# Patient Record
Sex: MALE | Race: WHITE | NOT HISPANIC OR LATINO | Employment: FULL TIME | URBAN - METROPOLITAN AREA
[De-identification: names, ages, dates, MRNs, and addresses within clinical notes are randomized per-mention and may not be internally consistent; named-entity substitution may affect disease eponyms.]

---

## 2018-04-04 ENCOUNTER — OFFICE VISIT (OUTPATIENT)
Dept: SURGERY | Facility: CLINIC | Age: 48
End: 2018-04-04
Payer: COMMERCIAL

## 2018-04-04 VITALS
RESPIRATION RATE: 18 BRPM | WEIGHT: 254 LBS | TEMPERATURE: 97 F | HEART RATE: 70 BPM | BODY MASS INDEX: 32.6 KG/M2 | SYSTOLIC BLOOD PRESSURE: 129 MMHG | DIASTOLIC BLOOD PRESSURE: 97 MMHG | OXYGEN SATURATION: 96 % | HEIGHT: 74 IN

## 2018-04-04 DIAGNOSIS — Z80.0 FAMILY HISTORY OF COLON CANCER: ICD-10-CM

## 2018-04-04 DIAGNOSIS — K63.5 HYPERPLASTIC COLONIC POLYP, UNSPECIFIED PART OF COLON: ICD-10-CM

## 2018-04-04 DIAGNOSIS — B96.81 HELICOBACTER POSITIVE GASTRITIS: Primary | ICD-10-CM

## 2018-04-04 DIAGNOSIS — K29.70 HELICOBACTER POSITIVE GASTRITIS: Primary | ICD-10-CM

## 2018-04-04 PROCEDURE — 99213 OFFICE O/P EST LOW 20 MIN: CPT | Mod: S$GLB,,, | Performed by: SURGERY

## 2018-04-04 RX ORDER — PANTOPRAZOLE SODIUM 40 MG/1
40 TABLET, DELAYED RELEASE ORAL DAILY
COMMUNITY
End: 2018-04-04 | Stop reason: SDUPTHER

## 2018-04-04 RX ORDER — LANSOPRAZOLE, AMOXICILLIN, CLARITHROMYCIN 30-500-500
KIT ORAL
Qty: 1 KIT | Refills: 0 | OUTPATIENT
Start: 2018-04-04 | End: 2018-04-18

## 2018-04-04 NOTE — LETTER
April 4, 2018      Tyrone Issa MD  [address]  [facility] [place] [address]           [facility] - General Surgery  [address] [facility] [place] [address]  Phone: [phone]  Fax: [phone]          Patient: Paolo Brower   MR Number: [id number]   YOB: 1970   Date of Visit: 4/4/2018       Dear Dr. Tyrone Issa:    Thank you for referring Paolo Brower to me for evaluation. Attached you will find relevant portions of my assessment and plan of care.    If you have questions, please do not hesitate to call me. I look forward to following Paolo Brower along with you.    Sincerely,    Rene Kinney MD    Enclosure  CC:  No Recipients    If you would like to receive this communication electronically, please contact [email or web address] or [phone] to request more information on [employer] access.    For providers and/or their staff who would like to refer a patient to [facility], please contact us through our one-stop-shop provider referral line, [facility], at [phone].    If you feel you have received this communication in error or would no longer like to receive these types of communications, please e-mail [email or web address]

## 2018-04-04 NOTE — PROGRESS NOTES
"General Surgery  [facility]  Follow-up    HPI/Follow-up exam:  Paolo Brower is a 47 y.o. male status post EGD and colonoscopy for abdominal pain, status post diverticular flare.  Patient also reported increased reflux disease in the last few weeks.  Biopsies reviewed with patient and pathology to reveal positive for Helicobacter pylori.  Colon polyp revealed hyperplastic rectal polyp.  Patient today describes an uncle who is 2 years older than he who was recently diagnosed with colon cancer.  Patient now has a family history of colon cancer.  Recommendations with 1 polyp and pathology or for repeat in 3 years.    PHYSICAL EXAM:  BP (!) 129/97 (BP Location: Right arm, Patient Position: Sitting)   Pulse 70   Temp 97.3 °F (36.3 °C)   Resp 18   Ht 6' 2" (1.88 m)   Wt 115.2 kg (254 lb)   SpO2 96%   BMI 32.61 kg/m²   Gen: Wd Wn male in NAD  Heent: Nc/At, MMM  Cv: RRR  Lung: Non-labored breathing, clear bilaterally  Abd: Soft, non-tender, non-distended  Ext: No cyanosis clubbing or edema    Pathology Results  (Last 10 years)    None        Assessment:  Paolo Brower is a 47 y.o. male s/p EGD and colonoscopy for abdominal pain and status post diverticular flare.  Patient has a family history of colon cancer.    Plan/Medical Decision Making:  Recommendation are for repeat screening colonoscopy in 3 years.  Patient now has a family history of colon cancer and now a partial history of a colon polyp.  Patient also has diverticulosis with mild loss of the luminal elasticity.  Recommendations are for high-fiber diet for diverticulosis.  Patient also has H. pylori positive pathology of stomach.  I will place the patient on triple therapy with a Prevpac and have him follow-up as scheduled below for repeat evaluation.    Follow-up in about 2 weeks (around 4/18/2018).        "

## 2018-04-06 ENCOUNTER — TELEPHONE (OUTPATIENT)
Dept: SURGERY | Facility: CLINIC | Age: 48
End: 2018-04-06

## 2018-04-06 NOTE — TELEPHONE ENCOUNTER
Writer spoke to pharmacist  Cyrus at [facility] pharmacy and he stated that he will get Rx ready for . Pt notified and expressed verbal understanding.

## 2018-04-06 NOTE — TELEPHONE ENCOUNTER
----- Message from Lianet Redmond sent at 4/6/2018 11:22 AM CDT -----  Contact: self  Patient needs to speak to the nurse about an antibiotic that was called in     Patient states it is not at the pharmacy     Please call back [phone]

## 2018-04-06 NOTE — TELEPHONE ENCOUNTER
----- Message from Concepcion Fiore sent at 4/5/2018 10:24 AM CDT -----  Contact: self [phone]  He is calling to follow up on the antibiotic prescription.  The pharmacy has not received it as yet.  Please send it to:   [facility] - [place], [place] - [address]  [address]  [place] [address]  Phone: [phone] Fax: [phone]    Thank you!

## 2018-07-16 ENCOUNTER — LAB VISIT (OUTPATIENT)
Dept: LAB | Facility: HOSPITAL | Age: 48
End: 2018-07-16
Attending: FAMILY MEDICINE
Payer: COMMERCIAL

## 2018-07-16 DIAGNOSIS — R32 INCONTINENCE OF URINE: ICD-10-CM

## 2018-07-16 DIAGNOSIS — Z12.5 SCREENING FOR PROSTATE CANCER: Primary | ICD-10-CM

## 2018-07-16 DIAGNOSIS — R35.0 FREQUENT URINATION: ICD-10-CM

## 2018-07-16 LAB
COMPLEXED PSA SERPL-MCNC: 21.4 NG/ML
ERYTHROCYTE [DISTWIDTH] IN BLOOD BY AUTOMATED COUNT: 13.5 %
ERYTHROCYTE [SEDIMENTATION RATE] IN BLOOD BY WESTERGREN METHOD: 30 MM/HR
HCT VFR BLD AUTO: 43.9 %
HGB BLD-MCNC: 15.2 G/DL
MCH RBC QN AUTO: 28.4 PG
MCHC RBC AUTO-ENTMCNC: 34.6 G/DL
MCV RBC AUTO: 82 FL
PLATELET # BLD AUTO: 302 K/UL
PMV BLD AUTO: 9.8 FL
RBC # BLD AUTO: 5.35 M/UL
WBC # BLD AUTO: 16.25 K/UL

## 2018-07-16 PROCEDURE — 84153 ASSAY OF PSA TOTAL: CPT

## 2018-07-16 PROCEDURE — 85651 RBC SED RATE NONAUTOMATED: CPT

## 2018-07-16 PROCEDURE — 36415 COLL VENOUS BLD VENIPUNCTURE: CPT

## 2018-07-16 PROCEDURE — 85027 COMPLETE CBC AUTOMATED: CPT

## 2018-08-01 ENCOUNTER — OFFICE VISIT (OUTPATIENT)
Dept: SURGERY | Facility: CLINIC | Age: 48
End: 2018-08-01
Payer: COMMERCIAL

## 2018-08-01 ENCOUNTER — TELEPHONE (OUTPATIENT)
Dept: SURGERY | Facility: CLINIC | Age: 48
End: 2018-08-01

## 2018-08-01 VITALS
DIASTOLIC BLOOD PRESSURE: 88 MMHG | HEIGHT: 74 IN | TEMPERATURE: 98 F | OXYGEN SATURATION: 96 % | HEART RATE: 86 BPM | BODY MASS INDEX: 32.47 KG/M2 | SYSTOLIC BLOOD PRESSURE: 135 MMHG | WEIGHT: 253 LBS

## 2018-08-01 DIAGNOSIS — K57.92 DIVERTICULITIS: Primary | ICD-10-CM

## 2018-08-01 DIAGNOSIS — K63.2 GASTROINTESTINAL FISTULA: ICD-10-CM

## 2018-08-01 DIAGNOSIS — N50.819 TESTICULAR PAIN: ICD-10-CM

## 2018-08-01 DIAGNOSIS — N39.0 RECURRENT UTI: ICD-10-CM

## 2018-08-01 PROCEDURE — 3008F BODY MASS INDEX DOCD: CPT | Mod: S$GLB,ICN,, | Performed by: SURGERY

## 2018-08-01 PROCEDURE — 99214 OFFICE O/P EST MOD 30 MIN: CPT | Mod: S$GLB,ICN,, | Performed by: SURGERY

## 2018-08-01 NOTE — TELEPHONE ENCOUNTER
Phoned pt. Told pt that he can still come in for his appointment even though he is running late.  Pt states that he is on his way and will be here in about 15-20 minutes.

## 2018-08-01 NOTE — TELEPHONE ENCOUNTER
----- Message from Loretta Thompson sent at 8/1/2018  1:40 PM CDT -----  Contact: pt  Type:  Same Day Appointment Request    Caller is requesting a same day appointment.  Caller declined first available appointment listed below.      Name of Caller: Paolo   When is the first available appointment? n/a  Symptoms:  n/a  Best Call Back Number:    Additional Information:  Pt missed his appt today and would like to know if he could still come in. Please call back and advise.

## 2018-08-03 NOTE — PROGRESS NOTES
[facility] Surgery H&P Note    Subjective:       Patient ID: Paolo Brower is a 48 y.o. male.    Chief Complaint: Consult (fistula)    HPI:  Paolo Brower is a 48 y.o. male status post appendectomy via open incision back 2014 and EGD colonoscopy back in March of 2018 presents today as an established patient follow-up as referral from Dr. Issa for possible colovesicular fistula.  Patient has been dealing with recurrent bouts of UTIs.  He has had some what appears to be prostatitis with elevated PSA levels treated with antibiotics.  He describes left-sided testicular and penile pain. Some left lower quadrant pain at times as well. Previously had a bout of diverticulitis back in January to February which was treated successfully with outpatient antibiotics and subsequently resolved however patient describes pain at times as left lower quadrant over the last 3-4 months.  No fevers chills.  Patient stated he has no pneumaturia.  He underwent CT scan by Urology at [facility] which showed possible colo vesicular fistula.  He was sent here today for evaluation of this problem.    History reviewed. No pertinent past medical history.  Past Surgical History:   Procedure Laterality Date    APPENDECTOMY      COLONOSCOPY  03/26/2018     Family History   Problem Relation Age of Onset    Diabetes Mother     Hypertension Mother     Cancer Father      Social History     Social History    Marital status:      Spouse name: N/A    Number of children: N/A    Years of education: N/A     Social History Main Topics    Smoking status: Current Every Day Smoker     Packs/day: 1.00     Types: Cigarettes    Smokeless tobacco: None    Alcohol use No    Drug use: No    Sexual activity: Not Asked     Other Topics Concern    None     Social History Narrative    None       No current outpatient prescriptions on file.     No current facility-administered medications for this visit.      Review of patient's allergies  "indicates:  No Known Allergies    Review of Systems   Constitutional: Negative for appetite change, chills and fever.   HENT: Negative for congestion, dental problem and drooling.    Eyes: Negative for photophobia, discharge and itching.   Respiratory: Negative for apnea and chest tightness.    Cardiovascular: Negative for chest pain, palpitations and leg swelling.   Gastrointestinal: Positive for abdominal pain. Negative for abdominal distention.   Endocrine: Negative for cold intolerance and heat intolerance.   Genitourinary: Negative for difficulty urinating and dysuria.   Musculoskeletal: Negative for arthralgias and back pain.   Skin: Negative for color change and pallor.   Neurological: Negative for dizziness, facial asymmetry and headaches.   Hematological: Negative for adenopathy. Does not bruise/bleed easily.   Psychiatric/Behavioral: Negative for agitation, behavioral problems and confusion.       Objective:      Vitals:    08/01/18 1429   BP: 135/88   Pulse: 86   Temp: 97.6 °F (36.4 °C)   TempSrc: Oral   SpO2: 96%   Weight: 114.8 kg (253 lb)   Height: 6' 2" (1.88 m)     Physical Exam   Constitutional: He is oriented to person, place, and time. He appears well-developed and well-nourished.   HENT:   Head: Normocephalic and atraumatic.   Eyes: EOM are normal. Pupils are equal, round, and reactive to light.   Neck: Normal range of motion. Neck supple. No thyromegaly present.   Cardiovascular: Normal rate and regular rhythm.    No murmur heard.  Pulmonary/Chest: Effort normal and breath sounds normal. No respiratory distress.   Abdominal: Soft. Bowel sounds are normal. He exhibits no distension. There is tenderness in the left lower quadrant.       Genitourinary:   Genitourinary Comments: No deformities noted on examination today however patient does have some tenderness to the left testicle left side of the penis   Musculoskeletal: Normal range of motion. He exhibits no edema.   Neurological: He is alert and " oriented to person, place, and time. No cranial nerve deficit.   Skin: Skin is warm. Capillary refill takes less than 2 seconds. No rash noted. He is not diaphoretic. No erythema.   Psychiatric: He has a normal mood and affect.       Lab Review:   CBC:   Lab Results   Component Value Date    WBC 16.25 (H) 07/16/2018    RBC 5.35 07/16/2018    HGB 15.2 07/16/2018    HCT 43.9 07/16/2018     07/16/2018     BMP: No results found for: GLU, NA, K, CL, CO2, BUN, CREATININE, CALCIUM  Diagnostics Review: I have reviewed the patient's outside hospital CT scan.  Some thickening of the sigmoid colon with significant thickening near the bladder.  Concern for colovesicular fistula however no rectal contrast for evaluation.     Assessment:       1. Diverticulitis    2. Gastrointestinal fistula    3. Recurrent UTI    4. Testicular pain        Plan:   Diverticulitis  -     CT Abdomen Pelvis With Contrast; Future; Expected date: 08/03/2018    Gastrointestinal fistula  -     CT Abdomen Pelvis With Contrast; Future; Expected date: 08/03/2018    Recurrent UTI  -     Ambulatory Referral to Urology    Testicular pain  -     Ambulatory Referral to Urology        Medical Decision Making/Counseling:  Patient has symptoms of left lower quadrant pain at times which fits the diagnosis of chronic diverticulitis.  I am unsure of the etiology of the patient's elevated PSA levels at times with left-sided testicular and penile pain.  He does have recurrent UTIs.  No pneumaturia.  Outside hospital CT scan concerning for colovesicular fistula.  Will proceed with CT scan with rectal contrast for further evaluation.  Patient also not happy with outside hospital urology evaluation.  Will have the patient be evaluated by Dr. Kearns of Urology here at [facility] for possible Cysto and for evaluation of her  complaints.

## 2018-08-13 ENCOUNTER — HOSPITAL ENCOUNTER (OUTPATIENT)
Dept: RADIOLOGY | Facility: HOSPITAL | Age: 48
Discharge: HOME | End: 2018-08-13
Attending: SURGERY
Payer: COMMERCIAL

## 2018-08-13 DIAGNOSIS — K63.2 GASTROINTESTINAL FISTULA: Primary | ICD-10-CM

## 2018-08-13 DIAGNOSIS — K63.2 GASTROINTESTINAL FISTULA: ICD-10-CM

## 2018-08-13 DIAGNOSIS — K57.92 DIVERTICULITIS: ICD-10-CM

## 2018-08-13 PROCEDURE — 25500020 PHARM REV CODE 255: Performed by: SURGERY

## 2018-08-13 PROCEDURE — 74178 CT ABD&PLV WO CNTR FLWD CNTR: CPT | Mod: 26,,, | Performed by: RADIOLOGY

## 2018-08-13 PROCEDURE — 74178 CT ABD&PLV WO CNTR FLWD CNTR: CPT | Mod: TC

## 2018-08-13 RX ADMIN — IOHEXOL 100 ML: 350 INJECTION, SOLUTION INTRAVENOUS at 10:08

## 2018-08-13 RX ADMIN — DIATRIZOATE MEGLUMINE AND DIATRIZOATE SODIUM 60 ML: 660; 100 LIQUID ORAL; RECTAL at 10:08

## 2018-08-15 ENCOUNTER — OFFICE VISIT (OUTPATIENT)
Dept: SURGERY | Facility: CLINIC | Age: 48
End: 2018-08-15
Payer: COMMERCIAL

## 2018-08-15 VITALS
HEIGHT: 74 IN | SYSTOLIC BLOOD PRESSURE: 144 MMHG | TEMPERATURE: 98 F | DIASTOLIC BLOOD PRESSURE: 85 MMHG | WEIGHT: 253 LBS | HEART RATE: 90 BPM | BODY MASS INDEX: 32.47 KG/M2 | OXYGEN SATURATION: 96 %

## 2018-08-15 DIAGNOSIS — N32.1 COLOVESICAL FISTULA: Primary | ICD-10-CM

## 2018-08-15 PROCEDURE — 99215 OFFICE O/P EST HI 40 MIN: CPT | Mod: S$GLB,ICN,, | Performed by: SURGERY

## 2018-08-15 PROCEDURE — 3008F BODY MASS INDEX DOCD: CPT | Mod: S$GLB,ICN,, | Performed by: SURGERY

## 2018-08-15 RX ORDER — LIDOCAINE HYDROCHLORIDE 10 MG/ML
1 INJECTION, SOLUTION EPIDURAL; INFILTRATION; INTRACAUDAL; PERINEURAL ONCE
Status: DISCONTINUED | OUTPATIENT
Start: 2018-08-15 | End: 2018-09-12 | Stop reason: HOSPADM

## 2018-08-15 RX ORDER — POLYETHYLENE GLYCOL 3350, SODIUM SULFATE ANHYDROUS, SODIUM BICARBONATE, SODIUM CHLORIDE, POTASSIUM CHLORIDE 236; 22.74; 6.74; 5.86; 2.97 G/4L; G/4L; G/4L; G/4L; G/4L
4 POWDER, FOR SOLUTION ORAL ONCE
Qty: 4000 ML | Refills: 0 | Status: SHIPPED | OUTPATIENT
Start: 2018-08-15 | End: 2018-08-15

## 2018-08-15 RX ORDER — SODIUM CHLORIDE 9 MG/ML
INJECTION, SOLUTION INTRAVENOUS CONTINUOUS
Status: CANCELLED | OUTPATIENT
Start: 2018-08-15

## 2018-08-15 NOTE — H&P
[facility] H&P Note    Subjective:       Patient ID: Paolo Brower is a 48 y.o. male.    Chief Complaint: Follow-up (CT)    HPI:  Paolo Brower is a 48 y.o. male with a history of open appendectomy presents today as an established patient for follow-up examination.  Patient's primary care provider is Dr. Issa.  Patient previously had a diverticular flare back in early spring and subsequently had a colonoscopy post flare.  He was noted to have a significantly tortuous colon during that colonoscopy.  He has since developed symptoms of chronic left lower quadrant pain and has been evaluated by an outpatient urologist Dr. Fernandez.  Patient describe symptoms of chronic left lower quadrant pain worse prior to having bowel movements and better after bowel movements.  This is pathognomonic for a diverticular stricture.  Patient does not have true pneumaturia.  Outside hospital CT scan without rectal contrast given inflammation and pelvis was concerning for colovesicular fistula.  Since last visit patient underwent CT scan with rectal contrast which confirmed colo vesicular fistula.  Patient now presents today for follow-up examination and for scheduling of surgery. Of note patient is a not had fevers.    No past medical history on file.  Past Surgical History:   Procedure Laterality Date    APPENDECTOMY      COLONOSCOPY  03/26/2018     Family History   Problem Relation Age of Onset    Diabetes Mother     Hypertension Mother     Cancer Father      Social History     Socioeconomic History    Marital status:      Spouse name: None    Number of children: None    Years of education: None    Highest education level: None   Social Needs    Financial resource strain: None    Food insecurity - worry: None    Food insecurity - inability: None    Transportation needs - medical: None    Transportation needs - non-medical: None   Occupational History    None   Tobacco Use    Smoking status:  "Current Every Day Smoker     Packs/day: 1.00     Types: Cigarettes   Substance and Sexual Activity    Alcohol use: No    Drug use: No    Sexual activity: None   Other Topics Concern    None   Social History Narrative    None       Current Outpatient Medications   Medication Sig Dispense Refill    polyethylene glycol (GOLYTELY,NULYTELY) 236-22.74-6.74 -5.86 gram suspension Take 4,000 mLs (4 L total) by mouth once. for 1 dose 4000 mL 0     Current Facility-Administered Medications   Medication Dose Route Frequency Provider Last Rate Last Dose    lidocaine (PF) 10 mg/ml (1%) injection 10 mg  1 mL Intradermal Once Rene Kinney MD        lidocaine (PF) 10 mg/ml (1%) injection 10 mg  1 mL Intradermal Once Rene Kinney MD         Review of patient's allergies indicates:  No Known Allergies    Review of Systems   Constitutional: Negative for appetite change, chills and fever.   HENT: Negative for congestion, dental problem and drooling.    Eyes: Negative for photophobia, discharge and itching.   Respiratory: Negative for apnea and chest tightness.    Cardiovascular: Negative for chest pain, palpitations and leg swelling.   Gastrointestinal: Positive for abdominal pain and constipation. Negative for abdominal distention and blood in stool.   Endocrine: Negative for cold intolerance and heat intolerance.   Genitourinary: Negative for difficulty urinating and dysuria.   Musculoskeletal: Negative for arthralgias and back pain.   Skin: Negative for color change and pallor.   Neurological: Negative for dizziness, facial asymmetry and headaches.   Hematological: Negative for adenopathy. Does not bruise/bleed easily.   Psychiatric/Behavioral: Negative for agitation, behavioral problems and confusion.       Objective:      Vitals:    08/15/18 1125   BP: (!) 144/85   Pulse: 90   Temp: 97.7 °F (36.5 °C)   TempSrc: Oral   SpO2: 96%   Weight: 114.8 kg (253 lb)   Height: 6' 2" (1.88 m)     Physical Exam "   Constitutional: He is oriented to person, place, and time. He appears well-developed and well-nourished.   HENT:   Head: Normocephalic and atraumatic.   Eyes: EOM are normal. Pupils are equal, round, and reactive to light.   Neck: Normal range of motion. Neck supple. No thyromegaly present.   Cardiovascular: Normal rate and regular rhythm.   No murmur heard.  Pulmonary/Chest: Effort normal and breath sounds normal. No respiratory distress.   Abdominal: Soft. Bowel sounds are normal. He exhibits no distension. There is tenderness in the left lower quadrant.       Musculoskeletal: Normal range of motion. He exhibits no edema.   Neurological: He is alert and oriented to person, place, and time. No cranial nerve deficit.   Skin: Skin is warm. Capillary refill takes less than 2 seconds. No rash noted. He is not diaphoretic. No erythema.   Psychiatric: He has a normal mood and affect.       Lab Review:   CBC:   Lab Results   Component Value Date    WBC 16.25 (H) 07/16/2018    RBC 5.35 07/16/2018    HGB 15.2 07/16/2018    HCT 43.9 07/16/2018     07/16/2018     BMP:   Lab Results   Component Value Date    BUN 18 08/13/2018    CREATININE 1.0 08/13/2018     Diagnostics Review: CT: Reviewed I have reviewed the patient's CT scan images.  Rectal contrast CT scan confirms colovesicular fistula.     Assessment:       1. Colovesical fistula        Plan:   Colovesical fistula  -     Case Request Operating Room: COLONOSCOPY  -     Place in Outpatient; Standing  -     Vital signs; Standing  -     Insert peripheral IV; Standing  -     Notify Physician; Standing  -     Diet NPO; Standing  -     Place BRUNO hose; Standing  -     Place sequential compression device; Standing  -     Basic metabolic panel; Future; Expected date: 08/15/2018  -     CBC auto differential; Future; Expected date: 08/15/2018  -     EKG 12-lead; Future  -     Case Request Operating Room: COLECTOMY,SIGMOID, CYSTECTOMY, PARTIAL  -     Vital signs; Standing  -      Insert peripheral IV; Standing  -     Verify beta-blocker dose taken within 24 hours if patient is prescribed beta-blocker; Standing  -     Height and weight; Standing  -     Intake and output; Standing  -     Verify discontinuation of antithrombotics; Standing  -     POCT glucose; Standing  -     Verify blood consent; Standing  -     Verify consent; Standing  -     Diet NPO; Standing  -     Pulse Oximetry Q4H; Standing    Other orders  -     polyethylene glycol (GOLYTELY,NULYTELY) 236-22.74-6.74 -5.86 gram suspension; Take 4,000 mLs (4 L total) by mouth once. for 1 dose  Dispense: 4000 mL; Refill: 0  -     lidocaine (PF) 10 mg/ml (1%) injection 10 mg; Inject 1 mL (10 mg total) into the skin once.  -     0.9%  NaCl infusion; Inject into the vein continuous.  -     lidocaine (PF) 10 mg/ml (1%) injection 10 mg; Inject 1 mL (10 mg total) into the skin once.  -     0.9%  NaCl infusion; Inject into the vein continuous.  -     ceFAZolin (ANCEF) 2 g in dextrose 5 % 50 mL IVPB; Inject 2 g into the vein On call Procedure (Surgery).        Medical Decision Making/Counseling:  Patient has chronic diverticulitis with diverticular stricture and now diagnosis of colovesicular fistula which was confirmed by CT scan performed with rectal contrast.  Patient has chronic left lower quadrant pain.  Worse prior to defecation.  Better after defecation.  Pains in the left groin as well which is referred pain. He has been evaluated by an outpatient neurologist however has never undergone a cysto.  With new diagnosis of colovesicular fistula as well as the need for ureteral stents in the setting of chronic diverticular disease in need of resection, I have placed a referral to Dr. Shaikh of Urology here at [facility] for evaluation of cysto with stent placements the day of surgery.  I have also discussed in person with Dr. Shaikh.  Patient's previous colonoscopy was reviewed from back in the spring.  Had tortuosity of the sigmoid colon.   Significant diverticulosis.  Given the new diagnosis of colovesicular fistula, I believe we need to proceed with repeat colonoscopy to define the area of significant disease of the colon for tattooing to ensure that piece of colon is adequately removed in surgery prior to anastomosis. Discussion was had with patient about repeat colonoscopy in he wishes to proceed.  Patient with colovesicular fistula and above symptomatology affecting his quality of life significantly will need colon resection, possible partial bladder resection, repair of bladder, and anastomosis. Possibilities of ostomy were discussed in depth in clinic today.  I believe the possibility of ostomy is low however always possible in colon surgery. Risks of bleeding, infection, anastomotic failure, needing to return to the operating room, intra-abdominal sepsis, possible need for ostomy creation, wound dehiscence, evisceration, postoperative hernia formation, and the intrinsic risks of anesthesia to include strokes, heart attacks and death were discussed in depth in clinic today.  Further the risk of repeat colonoscopy including perforation and bleeding were discussed in depth in clinic as well. Patient understands all the above surgical risk and wishes to proceed with colonoscopy on September 17th after adequate prep and colon resection of sigmoid colon with possible superior rectum, partial bladder resection for colovesicular fistula takedown, bladder repair, possible anastomosis, possible ostomy on September 18 in coordination with Dr. Shaikh of Urology for cysto with stent placement in the setting of chronic diverticular disease.  Total clinic time spent today in this patient 60 min with well greater than half of that time spent in face-to-face counseling.    We will plan to use Invanz 1 g the day of surgery for colon resection for perioperative antibiotics.

## 2018-09-07 ENCOUNTER — OFFICE VISIT (OUTPATIENT)
Dept: UROLOGY | Facility: CLINIC | Age: 48
End: 2018-09-07
Payer: COMMERCIAL

## 2018-09-07 VITALS
SYSTOLIC BLOOD PRESSURE: 136 MMHG | WEIGHT: 257 LBS | DIASTOLIC BLOOD PRESSURE: 89 MMHG | HEIGHT: 74 IN | HEART RATE: 73 BPM | BODY MASS INDEX: 32.98 KG/M2

## 2018-09-07 DIAGNOSIS — K57.92 DIVERTICULITIS: Primary | ICD-10-CM

## 2018-09-07 LAB
BILIRUB SERPL-MCNC: NEGATIVE MG/DL
BLOOD URINE, POC: ABNORMAL
COLOR, POC UA: ABNORMAL
GLUCOSE UR QL STRIP: NEGATIVE
KETONES UR QL STRIP: NEGATIVE
LEUKOCYTE ESTERASE URINE, POC: NEGATIVE
NITRITE, POC UA: NEGATIVE
PH, POC UA: 5
PROTEIN, POC: NEGATIVE
SPECIFIC GRAVITY, POC UA: 1025
UROBILINOGEN, POC UA: NEGATIVE

## 2018-09-07 PROCEDURE — 99243 OFF/OP CNSLTJ NEW/EST LOW 30: CPT | Mod: 25,S$GLB,, | Performed by: UROLOGY

## 2018-09-07 PROCEDURE — 81002 URINALYSIS NONAUTO W/O SCOPE: CPT | Mod: S$GLB,,, | Performed by: UROLOGY

## 2018-09-07 PROCEDURE — 99999 PR PBB SHADOW E&M-EST. PATIENT-LVL III: CPT | Mod: PBBFAC,,, | Performed by: UROLOGY

## 2018-09-07 NOTE — PROGRESS NOTES
Subjective:       Patient ID: Paolo Brower is a 48 y.o. male.    Chief Complaint:   OFFICE NOTE    This is a consultation with Dr. Rene Kinney.    CHIEF COMPLAINT:  Colovesical fistula.    HISTORY OF PRESENT ILLNESS:  Mr. Brower is a 48-year-old male who had 2  episodes of diverticulitis during 2018.  The patient is being evaluated by  Dr. Kinney and found to have a colovesical fistula.  Despite that, the  patient denies any pneumaturia or evidence of urinary tract infections.   There is imaging documentation of the fistula.  Dr. Kinney is planning  to perform a partial colectomy on 09/18/2018 and is requesting for me to  place ureteral catheters before that procedure.  Since I am going to be out  of town.  During that time, I suggested to the patient that we place a  double-J ureteral stent under general anesthesia on the 12th.  In that way,  Dr. Kinney will have his stent placed at the time of the colectomy and  he agreed for it.  The rationale, the risk and possible complications of  cystoscopy with bilateral stent placement has been fully discussed with the  patient including the sensation of the stent after the placement with  dysuria and recurrent hematuria.  It is to be noted that the patient had a  negative previous genitourinary history.  The remaining of the medical and  surgical history are well documented in the medical record and all these  were reviewed by me during this visit.  This patient is healthy with no  significant previous history.    FAMILY HISTORY:  Also negative for  disease.        EOR/HN dd: 09/07/2018 13:17:15 (CDT)   td: 09/07/2018 21:43:11 (CDT)  Doc ID #[id number]   Job ID #[id number]    CC: Rene Kinney MD         SEND A COPY OF THIS REPORT TO DR. LEWIS KINNEY  HPI  Review of Systems   Constitutional: Negative for activity change and appetite change.   HENT: Negative.    Eyes: Negative for discharge.   Respiratory: Negative for cough and shortness of breath.     Cardiovascular: Negative for chest pain and palpitations.   Gastrointestinal: Positive for abdominal pain. Negative for abdominal distention, constipation and vomiting.   Genitourinary: Negative for discharge, dysuria, flank pain, frequency, hematuria, testicular pain and urgency.   Musculoskeletal: Negative for arthralgias.   Skin: Negative for rash.   Neurological: Negative for dizziness.   Psychiatric/Behavioral: The patient is not nervous/anxious.        Objective:      Physical Exam   Constitutional: He appears well-developed and well-nourished.   HENT:   Head: Normocephalic.   Eyes: Pupils are equal, round, and reactive to light.   Neck: Normal range of motion.   Cardiovascular: Normal rate.    Pulmonary/Chest: Effort normal.   Abdominal: Soft. He exhibits no distension and no mass. There is tenderness.   Genitourinary: Rectum normal, prostate normal and penis normal. Rectal exam shows no external hemorrhoid, no mass and no tenderness. Prostate is not enlarged and not tender. Right testis shows no mass, no swelling and no tenderness. Left testis shows tenderness. Left testis shows no mass and no swelling.         Musculoskeletal: Normal range of motion.   Neurological: He is alert.   Skin: Skin is warm.     Psychiatric: He has a normal mood and affect.       Assessment:       1. Diverticulitis        Plan:       Diverticulitis  -     POCT URINE DIPSTICK WITHOUT MICROSCOPE  -     Case Request Operating Room: CYSTOSCOPY, WITH URETERAL STENT INSERTION  -     Place in Outpatient; Standing  -     Place sequential compression device; Standing    Other orders  -     IP VTE LOW RISK PATIENT; Standing  -     ceFAZolin (ANCEF) 2 g in dextrose 5 % 50 mL IVPB; Inject 2 g into the vein On call Procedure (Surgery).

## 2018-09-07 NOTE — LETTER
September 7, 2018      Rene Kinney MD  [address] [facility] [place] [address]           [facility] - Urology  [address] [place] [address]  Phone: [phone]  Fax: [phone]          Patient: Paolo Brower   MR Number: [id number]   YOB: 1970   Date of Visit: 9/7/2018       Dear Dr. Rene Kinney:    Thank you for referring Paolo Brower to me for evaluation. Attached you will find relevant portions of my assessment and plan of care.    If you have questions, please do not hesitate to call me. I look forward to following Paolo Brower along with you.    Sincerely,    Charli Shaikh MD    Enclosure  CC:  No Recipients    If you would like to receive this communication electronically, please contact [email or web address] or [phone] to request more information on [email or web address] Link access.    For providers and/or their staff who would like to refer a patient to [facility], please contact us through our one-stop-shop provider referral line, [facility], at [phone].    If you feel you have received this communication in error or would no longer like to receive these types of communications, please e-mail [email or web address]

## 2018-09-11 ENCOUNTER — HOSPITAL ENCOUNTER (OUTPATIENT)
Dept: CARDIOLOGY | Facility: HOSPITAL | Age: 48
Discharge: HOME | End: 2018-09-11
Attending: SURGERY
Payer: COMMERCIAL

## 2018-09-11 ENCOUNTER — ANESTHESIA EVENT (OUTPATIENT)
Dept: SURGERY | Facility: HOSPITAL | Age: 48
End: 2018-09-11
Payer: COMMERCIAL

## 2018-09-11 DIAGNOSIS — N32.1 COLOVESICAL FISTULA: ICD-10-CM

## 2018-09-11 PROCEDURE — 93005 ELECTROCARDIOGRAM TRACING: CPT

## 2018-09-11 NOTE — ANESTHESIA PREPROCEDURE EVALUATION
09/11/2018  Paolo Brower is a 48 y.o., male.    Anesthesia Evaluation    I have reviewed the Patient Summary Reports.    I have reviewed the Nursing Notes.   I have reviewed the Medications.     Review of Systems  Anesthesia Hx:  No problems with previous Anesthesia    Social:  Former Smoker        Physical Exam  General:  Well nourished    Airway/Jaw/Neck:  Airway Findings: Mouth Opening: Normal Tongue: Normal  General Airway Assessment: Adult  Mallampati: II  Improves to II with phonation.  TM Distance: Normal, at least 6 cm  Jaw/Neck Findings:  Neck ROM: Normal ROM       Chest/Lungs:  Chest/Lungs Findings:    Heart/Vascular:  Heart Findings: Rate: Normal  Rhythm: Regular Rhythm        Mental Status:  Mental Status Findings:  Cooperative, Alert and Oriented         Anesthesia Plan  Type of Anesthesia, risks & benefits discussed:  Anesthesia Type:  general  Patient's Preference:   Intra-op Monitoring Plan: standard ASA monitors  Intra-op Monitoring Plan Comments:   Post Op Pain Control Plan: IV/PO Opioids PRN  Post Op Pain Control Plan Comments:   Induction:   IV  Beta Blocker:  Patient is not currently on a Beta-Blocker (No further documentation required).       Informed Consent: Patient understands risks and agrees with Anesthesia plan.  Questions answered. Anesthesia consent signed with patient.  ASA Score: 2     Day of Surgery Review of History & Physical: I have interviewed and examined the patient. I have reviewed the patient's H&P dated:            Ready For Surgery From Anesthesia Perspective.

## 2018-09-12 ENCOUNTER — HOSPITAL ENCOUNTER (OUTPATIENT)
Facility: HOSPITAL | Age: 48
Discharge: HOME | End: 2018-09-12
Attending: UROLOGY | Admitting: UROLOGY
Payer: COMMERCIAL

## 2018-09-12 ENCOUNTER — ANESTHESIA (OUTPATIENT)
Dept: SURGERY | Facility: HOSPITAL | Age: 48
End: 2018-09-12
Payer: COMMERCIAL

## 2018-09-12 VITALS
SYSTOLIC BLOOD PRESSURE: 153 MMHG | DIASTOLIC BLOOD PRESSURE: 93 MMHG | HEART RATE: 66 BPM | HEIGHT: 74 IN | BODY MASS INDEX: 32.98 KG/M2 | TEMPERATURE: 98 F | WEIGHT: 257 LBS | RESPIRATION RATE: 18 BRPM | OXYGEN SATURATION: 96 %

## 2018-09-12 DIAGNOSIS — K57.92 DIVERTICULITIS: ICD-10-CM

## 2018-09-12 PROCEDURE — C1769 GUIDE WIRE: HCPCS | Performed by: UROLOGY

## 2018-09-12 PROCEDURE — 25000003 PHARM REV CODE 250: Performed by: ANESTHESIOLOGY

## 2018-09-12 PROCEDURE — 36000707: Performed by: UROLOGY

## 2018-09-12 PROCEDURE — 25000003 PHARM REV CODE 250: Performed by: UROLOGY

## 2018-09-12 PROCEDURE — 63600175 PHARM REV CODE 636 W HCPCS: Performed by: UROLOGY

## 2018-09-12 PROCEDURE — 76000 FLUOROSCOPY <1 HR PHYS/QHP: CPT | Mod: 26,59,, | Performed by: UROLOGY

## 2018-09-12 PROCEDURE — 25000003 PHARM REV CODE 250

## 2018-09-12 PROCEDURE — 37000008 HC ANESTHESIA 1ST 15 MINUTES: Performed by: UROLOGY

## 2018-09-12 PROCEDURE — 71000015 HC POSTOP RECOV 1ST HR: Performed by: UROLOGY

## 2018-09-12 PROCEDURE — 36000706: Performed by: UROLOGY

## 2018-09-12 PROCEDURE — S0028 INJECTION, FAMOTIDINE, 20 MG: HCPCS

## 2018-09-12 PROCEDURE — C2617 STENT, NON-COR, TEM W/O DEL: HCPCS | Performed by: UROLOGY

## 2018-09-12 PROCEDURE — 52332 CYSTOSCOPY AND TREATMENT: CPT | Mod: 50,,, | Performed by: UROLOGY

## 2018-09-12 PROCEDURE — 71000016 HC POSTOP RECOV ADDL HR: Performed by: UROLOGY

## 2018-09-12 PROCEDURE — 71000033 HC RECOVERY, INTIAL HOUR: Performed by: UROLOGY

## 2018-09-12 PROCEDURE — 37000009 HC ANESTHESIA EA ADD 15 MINS: Performed by: UROLOGY

## 2018-09-12 PROCEDURE — 74420 UROGRAPHY RTRGR +-KUB: CPT | Mod: 26,,, | Performed by: UROLOGY

## 2018-09-12 PROCEDURE — D9220A PRA ANESTHESIA: Mod: ANES,,, | Performed by: ANESTHESIOLOGY

## 2018-09-12 PROCEDURE — 63600175 PHARM REV CODE 636 W HCPCS: Performed by: NURSE ANESTHETIST, CERTIFIED REGISTERED

## 2018-09-12 PROCEDURE — D9220A PRA ANESTHESIA: Mod: CRNA,,, | Performed by: NURSE ANESTHETIST, CERTIFIED REGISTERED

## 2018-09-12 PROCEDURE — C1758 CATHETER, URETERAL: HCPCS | Performed by: UROLOGY

## 2018-09-12 DEVICE — IMPLANTABLE DEVICE: Type: IMPLANTABLE DEVICE | Site: KIDNEY | Status: FUNCTIONAL

## 2018-09-12 RX ORDER — PROPOFOL 10 MG/ML
VIAL (ML) INTRAVENOUS
Status: DISCONTINUED | OUTPATIENT
Start: 2018-09-12 | End: 2018-09-12

## 2018-09-12 RX ORDER — MIDAZOLAM HYDROCHLORIDE 1 MG/ML
INJECTION, SOLUTION INTRAMUSCULAR; INTRAVENOUS
Status: DISCONTINUED | OUTPATIENT
Start: 2018-09-12 | End: 2018-09-12

## 2018-09-12 RX ORDER — ONDANSETRON 2 MG/ML
4 INJECTION INTRAMUSCULAR; INTRAVENOUS DAILY PRN
Status: DISCONTINUED | OUTPATIENT
Start: 2018-09-12 | End: 2018-09-12 | Stop reason: HOSPADM

## 2018-09-12 RX ORDER — SODIUM CHLORIDE, SODIUM LACTATE, POTASSIUM CHLORIDE, CALCIUM CHLORIDE 600; 310; 30; 20 MG/100ML; MG/100ML; MG/100ML; MG/100ML
INJECTION, SOLUTION INTRAVENOUS CONTINUOUS
Status: DISCONTINUED | OUTPATIENT
Start: 2018-09-12 | End: 2018-09-12 | Stop reason: HOSPADM

## 2018-09-12 RX ORDER — DIPHENHYDRAMINE HYDROCHLORIDE 50 MG/ML
12.5 INJECTION INTRAMUSCULAR; INTRAVENOUS
Status: DISCONTINUED | OUTPATIENT
Start: 2018-09-12 | End: 2018-09-12 | Stop reason: HOSPADM

## 2018-09-12 RX ORDER — CEFAZOLIN SODIUM 2 G/50ML
SOLUTION INTRAVENOUS
Status: COMPLETED
Start: 2018-09-12 | End: 2018-09-12

## 2018-09-12 RX ORDER — CEFAZOLIN SODIUM 2 G/50ML
2 SOLUTION INTRAVENOUS ONCE
Status: DISCONTINUED | OUTPATIENT
Start: 2018-09-12 | End: 2018-09-12 | Stop reason: HOSPADM

## 2018-09-12 RX ORDER — SODIUM CHLORIDE 9 MG/ML
INJECTION, SOLUTION INTRAVENOUS CONTINUOUS
Status: DISCONTINUED | OUTPATIENT
Start: 2018-09-12 | End: 2018-09-12 | Stop reason: HOSPADM

## 2018-09-12 RX ORDER — LIDOCAINE HYDROCHLORIDE 20 MG/ML
JELLY TOPICAL
Status: DISCONTINUED | OUTPATIENT
Start: 2018-09-12 | End: 2018-09-12 | Stop reason: HOSPADM

## 2018-09-12 RX ORDER — SULFAMETHOXAZOLE AND TRIMETHOPRIM 800; 160 MG/1; MG/1
1 TABLET ORAL 2 TIMES DAILY
Qty: 40 TABLET | Refills: 0 | Status: SHIPPED | OUTPATIENT
Start: 2018-09-12 | End: 2018-10-02

## 2018-09-12 RX ORDER — OXYBUTYNIN CHLORIDE 5 MG/1
5 TABLET ORAL ONCE
Status: COMPLETED | OUTPATIENT
Start: 2018-09-12 | End: 2018-09-12

## 2018-09-12 RX ORDER — CEFAZOLIN SODIUM 2 G/50ML
2 SOLUTION INTRAVENOUS
Status: COMPLETED | OUTPATIENT
Start: 2018-09-12 | End: 2018-09-12

## 2018-09-12 RX ORDER — FAMOTIDINE 10 MG/ML
INJECTION INTRAVENOUS
Status: COMPLETED
Start: 2018-09-12 | End: 2018-09-12

## 2018-09-12 RX ORDER — MORPHINE SULFATE 4 MG/ML
2 INJECTION, SOLUTION INTRAMUSCULAR; INTRAVENOUS EVERY 5 MIN PRN
Status: DISCONTINUED | OUTPATIENT
Start: 2018-09-12 | End: 2018-09-12 | Stop reason: HOSPADM

## 2018-09-12 RX ORDER — PHENAZOPYRIDINE HYDROCHLORIDE 200 MG/1
200 TABLET, FILM COATED ORAL 3 TIMES DAILY PRN
Qty: 20 TABLET | Refills: 1 | Status: ON HOLD | OUTPATIENT
Start: 2018-09-12 | End: 2018-09-24 | Stop reason: HOSPADM

## 2018-09-12 RX ORDER — KETOROLAC TROMETHAMINE 30 MG/ML
15 INJECTION, SOLUTION INTRAMUSCULAR; INTRAVENOUS ONCE
Status: DISCONTINUED | OUTPATIENT
Start: 2018-09-12 | End: 2018-09-12 | Stop reason: HOSPADM

## 2018-09-12 RX ORDER — SODIUM CHLORIDE, SODIUM LACTATE, POTASSIUM CHLORIDE, CALCIUM CHLORIDE 600; 310; 30; 20 MG/100ML; MG/100ML; MG/100ML; MG/100ML
125 INJECTION, SOLUTION INTRAVENOUS CONTINUOUS
Status: DISCONTINUED | OUTPATIENT
Start: 2018-09-12 | End: 2018-09-12 | Stop reason: HOSPADM

## 2018-09-12 RX ORDER — MEPERIDINE HYDROCHLORIDE 50 MG/ML
INJECTION INTRAMUSCULAR; INTRAVENOUS; SUBCUTANEOUS
Status: DISCONTINUED | OUTPATIENT
Start: 2018-09-12 | End: 2018-09-12

## 2018-09-12 RX ORDER — FAMOTIDINE 10 MG/ML
20 INJECTION INTRAVENOUS ONCE
Status: DISCONTINUED | OUTPATIENT
Start: 2018-09-12 | End: 2018-09-12 | Stop reason: HOSPADM

## 2018-09-12 RX ADMIN — OXYBUTYNIN CHLORIDE 5 MG: 5 TABLET ORAL at 09:09

## 2018-09-12 RX ADMIN — PROPOFOL 200 MG: 10 INJECTION, EMULSION INTRAVENOUS at 07:09

## 2018-09-12 RX ADMIN — SODIUM CHLORIDE, POTASSIUM CHLORIDE, SODIUM LACTATE AND CALCIUM CHLORIDE 125 ML/HR: 600; 310; 30; 20 INJECTION, SOLUTION INTRAVENOUS at 09:09

## 2018-09-12 RX ADMIN — MIDAZOLAM HYDROCHLORIDE 2 MG: 1 INJECTION, SOLUTION INTRAMUSCULAR; INTRAVENOUS at 07:09

## 2018-09-12 RX ADMIN — SODIUM CHLORIDE: 9 INJECTION, SOLUTION INTRAVENOUS at 07:09

## 2018-09-12 RX ADMIN — MEPERIDINE HYDROCHLORIDE 50 MG: 50 INJECTION INTRAMUSCULAR; INTRAVENOUS; SUBCUTANEOUS at 07:09

## 2018-09-12 RX ADMIN — CEFAZOLIN SODIUM 2 G: 2 SOLUTION INTRAVENOUS at 07:09

## 2018-09-12 RX ADMIN — FAMOTIDINE 20 MG: 10 INJECTION, SOLUTION INTRAVENOUS at 07:09

## 2018-09-12 NOTE — OP NOTE
DATE OF PROCEDURE:  09/12/2018.    PREOPERATIVE DIAGNOSIS:  Colovesical fistula.    POSTOPERATIVE DIAGNOSIS:  Colovesical fistula.    PROCEDURE PERFORMED:  Cystoscopy, bilateral retrograde pyelograms.   Placement of bilateral ureteral stents.    ANESTHESIA:  General.    PROCEDURE IN DETAILS:  With the patient under satisfactory general  anesthesia and placed in lithotomy position, her genitals were prepped and  draped in the usual manner.  The urethra was treated with lidocaine gel 2%  and the #22-[place] Storz cystoscope was placed through urethra into the  bladder.  Inspection of the pendulous and bulbar urethra were found to be  unremarkable.  The prostatic urethra has a length of 4.5 cm with no  evidence of Bladder outlet obstruction.  The scope was advanced into the  bladder and the bladder was inspected with the 30 and 70 degree lenses.   The bladder did show a significant amount of debris.  The trigone was found  to be normal shape, size and position and both ureteral orifices are within  the trigone.  The inspection of the bladder shows an area of inflammation  with a possible fistula tract in the posterior left bladder wall.  The  opening of the fistula was difficult to be seen due to the inflammation of  the patient's bladder mucosa.    At this point, a cone tip catheter was placed through the cystoscope into  the bladder and into the left ureter, 10 mL of contrast material were  injected slowly under fluoroscopic guidance.  The retrograde pyelogram  shows that the ureters have a normal course and shape.  The upper  collecting system is also unremarkable.  The cone tip catheter was removed  and a wire guide was placed through the cystoscope into the ureter and all  the way up into the collecting system.  When the wire was properly placed,  26 cm 6-[place] double-J stent was placed under fluoroscopy.  When the  proper placement of the stent was achieved, the wire guide was removed and  the stent was seen  coiling in the renal pelvis and in the bladder.    The same procedure was carried out in the opposite side and the findings  were the same.  The collecting system is unremarkable and the same size  stent was placed.  The renal pelvis in the left side is a little bit  smaller than on the right and the stent has difficulty coiling in the upper  part after removal of the wire.  In any way, the proper placement of the  stent was achieved.  When this was achieved, x-rays were taken for future  documentation.  The bladder was emptied and the cystoscope was removed.   The patient was transferred to Recovery Room in satisfactory condition.    PLAN FOR THIS PATIENT:  After the colectomy was performed, I will suggest  that we leave the stents for a week to 10 days and then under local  anesthesia, we plan to remove the stents.            /maxine [id number] holly(s)          EOR/HN dd: 09/12/2018 08:41:43 (CDT)   td: 09/12/2018 09:07:42 (CDT)  Doc ID #[id number]   Job ID #[id number]    CC: Rene Kinney MD

## 2018-09-12 NOTE — H&P
CHIEF COMPLAINT:  Colovesical fistula.     HISTORY OF PRESENT ILLNESS:  Mr. Brower is a 48-year-old male who had 2  episodes of diverticulitis during 2018.  The patient is being evaluated by  Dr. Kinney and found to have a colovesical fistula.  Despite that, the  patient denies any pneumaturia or evidence of urinary tract infections.   There is imaging documentation of the fistula.  Dr. Kinney is planning  to perform a partial colectomy on 09/18/2018 and is requesting for me to  place ureteral catheters before that procedure.  Since I am going to be out  of town.  During that time, I suggested to the patient that we place a  double-J ureteral stent under general anesthesia on the 12th.  In that way,  Dr. Kinney will have his stent placed at the time of the colectomy and  he agreed for it.  The rationale, the risk and possible complications of  cystoscopy with bilateral stent placement has been fully discussed with the  patient including the sensation of the stent after the placement with  dysuria and recurrent hematuria.  It is to be noted that the patient had a  negative previous genitourinary history.  The remaining of the medical and  surgical history are well documented in the medical record and all these  were reviewed by me during this visit.  This patient is healthy with no  significant previous history.     FAMILY HISTORY:  Also negative for  disease.             HPI  Review of Systems   Constitutional: Negative for activity change and appetite change.   HENT: Negative.    Eyes: Negative for discharge.   Respiratory: Negative for cough and shortness of breath.    Cardiovascular: Negative for chest pain and palpitations.   Gastrointestinal: Positive for abdominal pain. Negative for abdominal distention, constipation and vomiting.   Genitourinary: Negative for discharge, dysuria, flank pain, frequency, hematuria, testicular pain and urgency.   Musculoskeletal: Negative for arthralgias.   Skin:  Negative for rash.   Neurological: Negative for dizziness.   Psychiatric/Behavioral: The patient is not nervous/anxious.        Objective:   Physical Exam   Constitutional: He appears well-developed and well-nourished.   HENT:   Head: Normocephalic.   Eyes: Pupils are equal, round, and reactive to light.   Neck: Normal range of motion.   Cardiovascular: Normal rate.    Pulmonary/Chest: Effort normal.   Abdominal: Soft. He exhibits no distension and no mass. There is tenderness.   Genitourinary: Rectum normal, prostate normal and penis normal. Rectal exam shows no external hemorrhoid, no mass and no tenderness. Prostate is not enlarged and not tender. Right testis shows no mass, no swelling and no tenderness. Left testis shows tenderness. Left testis shows no mass and no swelling.         Musculoskeletal: Normal range of motion.   Neurological: He is alert.   Skin: Skin is warm.     Psychiatric: He has a normal mood and affect.       Assessment:       1. Diverticulitis        Plan:       Diverticulitis  Cystoscopy and stent placement bilateral

## 2018-09-12 NOTE — TRANSFER OF CARE
"Anesthesia Transfer of Care Note    Patient: Paolo Brower    Procedure(s) Performed: Procedure(s) (LRB):  CYSTOSCOPY, WITH URETERAL STENT INSERTION (Bilateral)    Patient location: PACU    Anesthesia Type: general    Transport from OR: Transported from OR on room air with adequate spontaneous ventilation    Post pain: adequate analgesia    Post assessment: no apparent anesthetic complications and tolerated procedure well    Post vital signs: stable    Level of consciousness: awake, alert and oriented    Nausea/Vomiting: no nausea/vomiting    Complications: none    Transfer of care protocol was followed      Last vitals:   Visit Vitals  /76 (BP Location: Left arm, Patient Position: Lying)   Pulse 73   Temp 36.5 °C (97.7 °F) (Oral)   Resp 18   Ht 6' 2" (1.88 m)   Wt 116.6 kg (257 lb)   SpO2 96%   BMI 33.00 kg/m²     "

## 2018-09-12 NOTE — PLAN OF CARE
0855 pt back to bathroom . sg 1030 pt has urinated a few times all 50ml dr caban wants pt to void 100ml x1 before he can go home. 1100 pt voided 150 ml. He is getting dressed dr caban at bedside waiting on wife to come back to  her .

## 2018-09-12 NOTE — BRIEF OP NOTE
Brief Operative Note     Surgery Date: 9/12/2018    Surgeon:   Charli Shaikh MD     Pre-op Diagnosis:  Diverticulitis [K57.92]  Diverticulitis [K57.92]    Post-op Diagnosis:  Same    Procedure:  Procedure(s) (LRB):  CYSTOSCOPY, WITH URETERAL STENT INSERTION (Bilateral)    Anesthesia: Local MAC    Findings/Key Components:  [place]-vesical fistulae    Estimated Blood Loss: Minimal                                                                                                                           Discharge Summary    Admit Date: 9/12/2018     Attending Physician: Charli Shaikh MD     Discharge Physician: Charli Shaikh MD      Discharge Date: 9/12/2018    Treatments/Procedures: Procedure(s) (LRB):  CYSTOSCOPY, WITH URETERAL STENT INSERTION (Bilateral)  Final Diagnosis:[place]-vesical fistulae  Hospital Course: Cystoscopy, oscar retrogrades and oscar. Stent placement    Disposition: Home or Self Care     Patient Instructions:     Current Discharge Medication List:         Paolo Brower   Home Medication Instructions TEODORO:[id number]    Printed on:09/12/18 0835   Medication Information                      phenazopyridine (PYRIDIUM) 200 MG tablet  Take 1 tablet (200 mg total) by mouth 3 (three) times daily as needed for Pain.             sulfamethoxazole-trimethoprim 800-160mg (BACTRIM DS) 800-160 mg Tab  Take 1 tablet by mouth 2 (two) times daily. for 20 days                     Current Discharge Medication List      START taking these medications    Details   phenazopyridine (PYRIDIUM) 200 MG tablet Take 1 tablet (200 mg total) by mouth 3 (three) times daily as needed for Pain.  Qty: 20 tablet, Refills: 1      sulfamethoxazole-trimethoprim 800-160mg (BACTRIM DS) 800-160 mg Tab Take 1 tablet by mouth 2 (two) times daily. for 20 days  Qty: 40 tablet, Refills: 0             Discharge Procedure Orders:    Discharge Procedure Orders   Diet Adult Regular     Activity as tolerated

## 2018-09-12 NOTE — ANESTHESIA POSTPROCEDURE EVALUATION
"Anesthesia Post Evaluation    Patient: Paolo Brower    Procedure(s) Performed: Procedure(s) (LRB):  CYSTOSCOPY, WITH URETERAL STENT INSERTION (Bilateral)    Final Anesthesia Type: general  Patient location during evaluation: PACU  Patient participation: Yes- Able to Participate  Level of consciousness: awake and alert  Post-procedure vital signs: reviewed and stable  Pain management: adequate  Airway patency: patent  PONV status at discharge: No PONV  Anesthetic complications: no      Cardiovascular status: blood pressure returned to baseline  Respiratory status: unassisted  Hydration status: euvolemic  Follow-up not needed.        Visit Vitals  BP (!) 153/93   Pulse 66   Temp 36.5 °C (97.7 °F)   Resp 18   Ht 6' 2" (1.88 m)   Wt 116.6 kg (257 lb)   SpO2 96%   BMI 33.00 kg/m²       Pain/Ortiz Score: Pain Assessment Performed: Yes (9/12/2018  6:44 AM)  Presence of Pain: denies (9/12/2018  6:44 AM)  Ortiz Score: 10 (9/12/2018 11:15 AM)  Modified Ortiz Score: 20 (9/12/2018 11:15 AM)        "

## 2018-09-17 ENCOUNTER — ANESTHESIA (OUTPATIENT)
Dept: SURGERY | Facility: HOSPITAL | Age: 48
DRG: 655 | End: 2018-09-17
Payer: COMMERCIAL

## 2018-09-17 ENCOUNTER — ANESTHESIA EVENT (OUTPATIENT)
Dept: SURGERY | Facility: HOSPITAL | Age: 48
DRG: 655 | End: 2018-09-17
Payer: COMMERCIAL

## 2018-09-17 PROCEDURE — D9220A PRA ANESTHESIA: Mod: ANES,,, | Performed by: ANESTHESIOLOGY

## 2018-09-17 PROCEDURE — D9220A PRA ANESTHESIA: Mod: CRNA,,, | Performed by: NURSE ANESTHETIST, CERTIFIED REGISTERED

## 2018-09-17 PROCEDURE — 63600175 PHARM REV CODE 636 W HCPCS: Performed by: NURSE ANESTHETIST, CERTIFIED REGISTERED

## 2018-09-17 PROCEDURE — 0DBP8ZZ EXCISION OF RECTUM, VIA NATURAL OR ARTIFICIAL OPENING ENDOSCOPIC: ICD-10-PCS | Performed by: SURGERY

## 2018-09-17 RX ORDER — MIDAZOLAM HYDROCHLORIDE 1 MG/ML
INJECTION, SOLUTION INTRAMUSCULAR; INTRAVENOUS
Status: DISCONTINUED | OUTPATIENT
Start: 2018-09-17 | End: 2018-09-17

## 2018-09-17 RX ORDER — PROPOFOL 10 MG/ML
VIAL (ML) INTRAVENOUS
Status: DISCONTINUED | OUTPATIENT
Start: 2018-09-17 | End: 2018-09-17

## 2018-09-17 RX ADMIN — PROPOFOL 20 MG: 10 INJECTION, EMULSION INTRAVENOUS at 09:09

## 2018-09-17 RX ADMIN — PROPOFOL 20 MG: 10 INJECTION, EMULSION INTRAVENOUS at 08:09

## 2018-09-17 RX ADMIN — PROPOFOL 30 MG: 10 INJECTION, EMULSION INTRAVENOUS at 09:09

## 2018-09-17 RX ADMIN — PROPOFOL 50 MG: 10 INJECTION, EMULSION INTRAVENOUS at 08:09

## 2018-09-17 RX ADMIN — PROPOFOL 30 MG: 10 INJECTION, EMULSION INTRAVENOUS at 08:09

## 2018-09-17 RX ADMIN — PROPOFOL 100 MG: 10 INJECTION, EMULSION INTRAVENOUS at 08:09

## 2018-09-17 RX ADMIN — MIDAZOLAM HYDROCHLORIDE 2 MG: 1 INJECTION, SOLUTION INTRAMUSCULAR; INTRAVENOUS at 08:09

## 2018-09-17 NOTE — ANESTHESIA PREPROCEDURE EVALUATION
09/17/2018  Paolo Brower is a 48 y.o., male.    Pre-op Assessment    I have reviewed the Patient Summary Reports.    I have reviewed the Nursing Notes.   I have reviewed the Medications.     Review of Systems  Anesthesia Hx:  No problems with previous Anesthesia  Neg history of prior surgery. Denies Family Hx of Anesthesia complications.   Denies Personal Hx of Anesthesia complications.   Social:  Smoker    Hematology/Oncology:  Hematology Normal   Oncology Normal     EENT/Dental:EENT/Dental Normal   Cardiovascular:  Cardiovascular Normal     Pulmonary:  Pulmonary Normal    Renal/:  Renal/ Normal     Hepatic/GI:  Hepatic/GI Normal    Musculoskeletal:  Musculoskeletal Normal    Neurological:  Neurology Normal    Endocrine:  Endocrine Normal    Dermatological:  Skin Normal    Psych:  Psychiatric Normal           Physical Exam  General:  Well nourished    Airway/Jaw/Neck:  AIRWAY FINDINGS: Normal      Eyes/Ears/Nose:  EYES/EARS/NOSE FINDINGS: Normal   Dental:  DENTAL FINDINGS: Normal   Chest/Lungs:  Chest/Lungs Clear    Heart/Vascular:  Heart Findings: Normal Heart murmur: negative Vascular Findings: Normal    Abdomen:  Abdomen Findings: Normal    Musculoskeletal:  Musculoskeletal Findings: Normal   Skin:  Skin Findings: Normal         Anesthesia Plan  Type of Anesthesia, risks & benefits discussed:  Anesthesia Type:  general  Patient's Preference:   Intra-op Monitoring Plan: standard ASA monitors  Intra-op Monitoring Plan Comments:   Post Op Pain Control Plan:   Post Op Pain Control Plan Comments:   Induction:   IV  Beta Blocker:  Patient is not currently on a Beta-Blocker (No further documentation required).       Informed Consent: Patient understands risks and agrees with Anesthesia plan.  Questions answered. Anesthesia consent signed with patient.  ASA Score: 2     Day of Surgery Review of History &  Physical:    H&P update referred to the provider.

## 2018-09-17 NOTE — TRANSFER OF CARE
Anesthesia Transfer of Care Note    Patient: Paolo Brower    Procedure(s) Performed: Procedure(s) (LRB):  COLONOSCOPY (N/A)    Patient location: PACU    Anesthesia Type: general    Transport from OR: Transported from OR on room air with adequate spontaneous ventilation    Post pain: adequate analgesia    Post assessment: no apparent anesthetic complications and tolerated procedure well    Post vital signs: stable    Level of consciousness: awake, alert and oriented    Nausea/Vomiting: no nausea/vomiting    Complications: none    Transfer of care protocol was followed      Last vitals:   Visit Vitals  /88 (BP Location: Left arm, Patient Position: Lying)   Pulse 87   Temp 36.7 °C (98 °F) (Oral)   Resp 18   Wt 114.8 kg (253 lb)   SpO2 96%   BMI 32.48 kg/m²

## 2018-09-18 ENCOUNTER — ANESTHESIA EVENT (OUTPATIENT)
Dept: SURGERY | Facility: HOSPITAL | Age: 48
DRG: 655 | End: 2018-09-18
Payer: COMMERCIAL

## 2018-09-18 ENCOUNTER — HOSPITAL ENCOUNTER (INPATIENT)
Facility: HOSPITAL | Age: 48
LOS: 6 days | Discharge: HOME | DRG: 655 | End: 2018-09-24
Attending: SURGERY | Admitting: INTERNAL MEDICINE
Payer: COMMERCIAL

## 2018-09-18 ENCOUNTER — ANESTHESIA (OUTPATIENT)
Dept: SURGERY | Facility: HOSPITAL | Age: 48
DRG: 655 | End: 2018-09-18
Payer: COMMERCIAL

## 2018-09-18 DIAGNOSIS — N32.1 COLOVESICAL FISTULA: Primary | ICD-10-CM

## 2018-09-18 DIAGNOSIS — Z48.89 ENCOUNTER FOR POSTOPERATIVE CARE: ICD-10-CM

## 2018-09-18 LAB
ANION GAP SERPL CALC-SCNC: 7 MMOL/L
BUN SERPL-MCNC: 15 MG/DL
CALCIUM SERPL-MCNC: 8.1 MG/DL
CHLORIDE SERPL-SCNC: 103 MMOL/L
CO2 SERPL-SCNC: 26 MMOL/L
CREAT SERPL-MCNC: 1.2 MG/DL
ERYTHROCYTE [DISTWIDTH] IN BLOOD BY AUTOMATED COUNT: 13.1 %
EST. GFR  (AFRICAN AMERICAN): >60 ML/MIN/1.73 M^2
EST. GFR  (NON AFRICAN AMERICAN): >60 ML/MIN/1.73 M^2
GLUCOSE SERPL-MCNC: 124 MG/DL
HCT VFR BLD AUTO: 38.7 %
HGB BLD-MCNC: 13.3 G/DL
MAGNESIUM SERPL-MCNC: 2 MG/DL
MCH RBC QN AUTO: 28.7 PG
MCHC RBC AUTO-ENTMCNC: 34.4 G/DL
MCV RBC AUTO: 83 FL
PLATELET # BLD AUTO: 292 K/UL
PMV BLD AUTO: 9.1 FL
POTASSIUM SERPL-SCNC: 4 MMOL/L
RBC # BLD AUTO: 4.64 M/UL
SODIUM SERPL-SCNC: 136 MMOL/L
WBC # BLD AUTO: 18.41 K/UL

## 2018-09-18 PROCEDURE — 51865 REPAIR OF BLADDER WOUND: CPT | Mod: 80,51,, | Performed by: SURGERY

## 2018-09-18 PROCEDURE — 94640 AIRWAY INHALATION TREATMENT: CPT

## 2018-09-18 PROCEDURE — D9220A PRA ANESTHESIA: Mod: ANES,,, | Performed by: ANESTHESIOLOGY

## 2018-09-18 PROCEDURE — 44139 MOBILIZATION OF COLON: CPT | Mod: ,,, | Performed by: SURGERY

## 2018-09-18 PROCEDURE — 25000003 PHARM REV CODE 250: Performed by: ANESTHESIOLOGY

## 2018-09-18 PROCEDURE — 36415 COLL VENOUS BLD VENIPUNCTURE: CPT

## 2018-09-18 PROCEDURE — 0DJD8ZZ INSPECTION OF LOWER INTESTINAL TRACT, VIA NATURAL OR ARTIFICIAL OPENING ENDOSCOPIC: ICD-10-PCS | Performed by: SURGERY

## 2018-09-18 PROCEDURE — 0DBN0ZZ EXCISION OF SIGMOID COLON, OPEN APPROACH: ICD-10-PCS | Performed by: SURGERY

## 2018-09-18 PROCEDURE — 63600175 PHARM REV CODE 636 W HCPCS: Performed by: SURGERY

## 2018-09-18 PROCEDURE — 0DBP0ZZ EXCISION OF RECTUM, OPEN APPROACH: ICD-10-PCS | Performed by: SURGERY

## 2018-09-18 PROCEDURE — 44139 MOBILIZATION OF COLON: CPT | Mod: 80,,, | Performed by: SURGERY

## 2018-09-18 PROCEDURE — 0TQB0ZZ REPAIR BLADDER, OPEN APPROACH: ICD-10-PCS | Performed by: SURGERY

## 2018-09-18 PROCEDURE — 0WUF07Z SUPPLEMENT ABDOMINAL WALL WITH AUTOLOGOUS TISSUE SUBSTITUTE, OPEN APPROACH: ICD-10-PCS | Performed by: SURGERY

## 2018-09-18 PROCEDURE — 36000709 HC OR TIME LEV III EA ADD 15 MIN: Performed by: SURGERY

## 2018-09-18 PROCEDURE — 80048 BASIC METABOLIC PNL TOTAL CA: CPT

## 2018-09-18 PROCEDURE — 20000000 HC ICU ROOM

## 2018-09-18 PROCEDURE — 71000033 HC RECOVERY, INTIAL HOUR: Performed by: SURGERY

## 2018-09-18 PROCEDURE — 37000009 HC ANESTHESIA EA ADD 15 MINS: Performed by: SURGERY

## 2018-09-18 PROCEDURE — 27201423 OPTIME MED/SURG SUP & DEVICES STERILE SUPPLY: Performed by: SURGERY

## 2018-09-18 PROCEDURE — 85027 COMPLETE CBC AUTOMATED: CPT

## 2018-09-18 PROCEDURE — 25000003 PHARM REV CODE 250: Performed by: SURGERY

## 2018-09-18 PROCEDURE — 44145 PARTIAL REMOVAL OF COLON: CPT | Mod: 80,,, | Performed by: SURGERY

## 2018-09-18 PROCEDURE — 63600175 PHARM REV CODE 636 W HCPCS: Performed by: ANESTHESIOLOGY

## 2018-09-18 PROCEDURE — 49905 OMENTAL FLAP INTRA-ABDOM: CPT | Mod: 80,,, | Performed by: SURGERY

## 2018-09-18 PROCEDURE — 37000008 HC ANESTHESIA 1ST 15 MINUTES: Performed by: SURGERY

## 2018-09-18 PROCEDURE — D9220A PRA ANESTHESIA: Mod: CRNA,,, | Performed by: NURSE ANESTHETIST, CERTIFIED REGISTERED

## 2018-09-18 PROCEDURE — 25000242 PHARM REV CODE 250 ALT 637 W/ HCPCS: Performed by: SURGERY

## 2018-09-18 PROCEDURE — 63600175 PHARM REV CODE 636 W HCPCS: Performed by: NURSE ANESTHETIST, CERTIFIED REGISTERED

## 2018-09-18 PROCEDURE — 88307 TISSUE EXAM BY PATHOLOGIST: CPT | Performed by: PATHOLOGY

## 2018-09-18 PROCEDURE — 88307 TISSUE EXAM BY PATHOLOGIST: CPT | Mod: 26,,, | Performed by: PATHOLOGY

## 2018-09-18 PROCEDURE — 36000708 HC OR TIME LEV III 1ST 15 MIN: Performed by: SURGERY

## 2018-09-18 PROCEDURE — C9290 INJ, BUPIVACAINE LIPOSOME: HCPCS | Performed by: SURGERY

## 2018-09-18 PROCEDURE — 83735 ASSAY OF MAGNESIUM: CPT

## 2018-09-18 PROCEDURE — 44145 PARTIAL REMOVAL OF COLON: CPT | Mod: 22,,, | Performed by: SURGERY

## 2018-09-18 PROCEDURE — 25000003 PHARM REV CODE 250: Performed by: NURSE ANESTHETIST, CERTIFIED REGISTERED

## 2018-09-18 PROCEDURE — 94799 UNLISTED PULMONARY SVC/PX: CPT

## 2018-09-18 PROCEDURE — 94760 N-INVAS EAR/PLS OXIMETRY 1: CPT

## 2018-09-18 PROCEDURE — 27000221 HC OXYGEN, UP TO 24 HOURS

## 2018-09-18 PROCEDURE — 49905 OMENTAL FLAP INTRA-ABDOM: CPT | Mod: ,,, | Performed by: SURGERY

## 2018-09-18 PROCEDURE — 51865 REPAIR OF BLADDER WOUND: CPT | Mod: 51,,, | Performed by: SURGERY

## 2018-09-18 PROCEDURE — 99900035 HC TECH TIME PER 15 MIN (STAT)

## 2018-09-18 RX ORDER — NEOSTIGMINE METHYLSULFATE 1 MG/ML
INJECTION, SOLUTION INTRAVENOUS
Status: DISCONTINUED | OUTPATIENT
Start: 2018-09-18 | End: 2018-09-18

## 2018-09-18 RX ORDER — ONDANSETRON 2 MG/ML
4 INJECTION INTRAMUSCULAR; INTRAVENOUS DAILY PRN
Status: DISCONTINUED | OUTPATIENT
Start: 2018-09-18 | End: 2018-09-19

## 2018-09-18 RX ORDER — SODIUM CHLORIDE, SODIUM LACTATE, POTASSIUM CHLORIDE, CALCIUM CHLORIDE 600; 310; 30; 20 MG/100ML; MG/100ML; MG/100ML; MG/100ML
INJECTION, SOLUTION INTRAVENOUS CONTINUOUS
Status: DISCONTINUED | OUTPATIENT
Start: 2018-09-18 | End: 2018-09-18

## 2018-09-18 RX ORDER — HEPARIN SODIUM 5000 [USP'U]/ML
5000 INJECTION, SOLUTION INTRAVENOUS; SUBCUTANEOUS EVERY 8 HOURS
Status: DISCONTINUED | OUTPATIENT
Start: 2018-09-18 | End: 2018-09-24 | Stop reason: HOSPADM

## 2018-09-18 RX ORDER — SUCCINYLCHOLINE CHLORIDE 20 MG/ML
INJECTION INTRAMUSCULAR; INTRAVENOUS
Status: DISCONTINUED | OUTPATIENT
Start: 2018-09-18 | End: 2018-09-18

## 2018-09-18 RX ORDER — ERTAPENEM 1 G/1
INJECTION, POWDER, LYOPHILIZED, FOR SOLUTION INTRAMUSCULAR; INTRAVENOUS
Status: DISCONTINUED | OUTPATIENT
Start: 2018-09-18 | End: 2018-09-18

## 2018-09-18 RX ORDER — KETOROLAC TROMETHAMINE 30 MG/ML
15 INJECTION, SOLUTION INTRAMUSCULAR; INTRAVENOUS ONCE
Status: COMPLETED | OUTPATIENT
Start: 2018-09-18 | End: 2018-09-18

## 2018-09-18 RX ORDER — ONDANSETRON 2 MG/ML
4 INJECTION INTRAMUSCULAR; INTRAVENOUS EVERY 4 HOURS PRN
Status: DISCONTINUED | OUTPATIENT
Start: 2018-09-18 | End: 2018-09-24 | Stop reason: HOSPADM

## 2018-09-18 RX ORDER — GLYCOPYRROLATE 0.2 MG/ML
INJECTION INTRAMUSCULAR; INTRAVENOUS
Status: DISCONTINUED | OUTPATIENT
Start: 2018-09-18 | End: 2018-09-18

## 2018-09-18 RX ORDER — PANTOPRAZOLE SODIUM 40 MG/10ML
40 INJECTION, POWDER, LYOPHILIZED, FOR SOLUTION INTRAVENOUS DAILY
Status: DISCONTINUED | OUTPATIENT
Start: 2018-09-19 | End: 2018-09-23

## 2018-09-18 RX ORDER — LIDOCAINE HYDROCHLORIDE 10 MG/ML
1 INJECTION, SOLUTION EPIDURAL; INFILTRATION; INTRACAUDAL; PERINEURAL ONCE
Status: DISCONTINUED | OUTPATIENT
Start: 2018-09-18 | End: 2018-09-18 | Stop reason: HOSPADM

## 2018-09-18 RX ORDER — SODIUM CHLORIDE 9 MG/ML
INJECTION, SOLUTION INTRAVENOUS CONTINUOUS
Status: DISCONTINUED | OUTPATIENT
Start: 2018-09-18 | End: 2018-09-18

## 2018-09-18 RX ORDER — MORPHINE SULFATE 4 MG/ML
2 INJECTION, SOLUTION INTRAMUSCULAR; INTRAVENOUS EVERY 5 MIN PRN
Status: DISCONTINUED | OUTPATIENT
Start: 2018-09-18 | End: 2018-09-19

## 2018-09-18 RX ORDER — IPRATROPIUM BROMIDE AND ALBUTEROL SULFATE 2.5; .5 MG/3ML; MG/3ML
3 SOLUTION RESPIRATORY (INHALATION) EVERY 6 HOURS
Status: DISCONTINUED | OUTPATIENT
Start: 2018-09-18 | End: 2018-09-22

## 2018-09-18 RX ORDER — HYDROMORPHONE HYDROCHLORIDE 2 MG/ML
INJECTION, SOLUTION INTRAMUSCULAR; INTRAVENOUS; SUBCUTANEOUS
Status: DISCONTINUED | OUTPATIENT
Start: 2018-09-18 | End: 2018-09-18

## 2018-09-18 RX ORDER — SODIUM CHLORIDE, SODIUM LACTATE, POTASSIUM CHLORIDE, CALCIUM CHLORIDE 600; 310; 30; 20 MG/100ML; MG/100ML; MG/100ML; MG/100ML
INJECTION, SOLUTION INTRAVENOUS CONTINUOUS
Status: DISCONTINUED | OUTPATIENT
Start: 2018-09-18 | End: 2018-09-23

## 2018-09-18 RX ORDER — HYDROMORPHONE HYDROCHLORIDE 2 MG/ML
1 INJECTION, SOLUTION INTRAMUSCULAR; INTRAVENOUS; SUBCUTANEOUS
Status: DISCONTINUED | OUTPATIENT
Start: 2018-09-18 | End: 2018-09-23

## 2018-09-18 RX ORDER — PROPOFOL 10 MG/ML
VIAL (ML) INTRAVENOUS
Status: DISCONTINUED | OUTPATIENT
Start: 2018-09-18 | End: 2018-09-18

## 2018-09-18 RX ORDER — DIPHENHYDRAMINE HYDROCHLORIDE 50 MG/ML
12.5 INJECTION INTRAMUSCULAR; INTRAVENOUS
Status: DISCONTINUED | OUTPATIENT
Start: 2018-09-18 | End: 2018-09-19

## 2018-09-18 RX ORDER — CEFAZOLIN SODIUM 2 G/50ML
2 SOLUTION INTRAVENOUS
Status: COMPLETED | OUTPATIENT
Start: 2018-09-18 | End: 2018-09-18

## 2018-09-18 RX ORDER — SODIUM CHLORIDE 0.9 % (FLUSH) 0.9 %
3 SYRINGE (ML) INJECTION
Status: DISCONTINUED | OUTPATIENT
Start: 2018-09-18 | End: 2018-09-24 | Stop reason: HOSPADM

## 2018-09-18 RX ORDER — MIDAZOLAM HYDROCHLORIDE 1 MG/ML
2 INJECTION INTRAMUSCULAR; INTRAVENOUS ONCE
Status: COMPLETED | OUTPATIENT
Start: 2018-09-18 | End: 2018-09-18

## 2018-09-18 RX ORDER — MEPERIDINE HYDROCHLORIDE 50 MG/ML
INJECTION INTRAMUSCULAR; INTRAVENOUS; SUBCUTANEOUS
Status: DISCONTINUED | OUTPATIENT
Start: 2018-09-18 | End: 2018-09-18

## 2018-09-18 RX ORDER — ACETAMINOPHEN 10 MG/ML
1000 INJECTION, SOLUTION INTRAVENOUS EVERY 8 HOURS
Status: COMPLETED | OUTPATIENT
Start: 2018-09-18 | End: 2018-09-19

## 2018-09-18 RX ORDER — SODIUM CHLORIDE, SODIUM LACTATE, POTASSIUM CHLORIDE, CALCIUM CHLORIDE 600; 310; 30; 20 MG/100ML; MG/100ML; MG/100ML; MG/100ML
125 INJECTION, SOLUTION INTRAVENOUS CONTINUOUS
Status: DISCONTINUED | OUTPATIENT
Start: 2018-09-18 | End: 2018-09-19

## 2018-09-18 RX ORDER — CISATRACURIUM BESYLATE 2 MG/ML
INJECTION, SOLUTION INTRAVENOUS
Status: DISCONTINUED | OUTPATIENT
Start: 2018-09-18 | End: 2018-09-18

## 2018-09-18 RX ORDER — ONDANSETRON 2 MG/ML
INJECTION INTRAMUSCULAR; INTRAVENOUS
Status: DISCONTINUED | OUTPATIENT
Start: 2018-09-18 | End: 2018-09-18

## 2018-09-18 RX ORDER — ACETAMINOPHEN 10 MG/ML
INJECTION, SOLUTION INTRAVENOUS
Status: DISPENSED
Start: 2018-09-18 | End: 2018-09-19

## 2018-09-18 RX ADMIN — SODIUM CHLORIDE, POTASSIUM CHLORIDE, SODIUM LACTATE AND CALCIUM CHLORIDE: 600; 310; 30; 20 INJECTION, SOLUTION INTRAVENOUS at 12:09

## 2018-09-18 RX ADMIN — CISATRACURIUM BESYLATE 4 MG: 2 INJECTION INTRAVENOUS at 12:09

## 2018-09-18 RX ADMIN — PROPOFOL 200 MG: 10 INJECTION, EMULSION INTRAVENOUS at 11:09

## 2018-09-18 RX ADMIN — MIDAZOLAM HYDROCHLORIDE 2 MG: 1 INJECTION, SOLUTION INTRAMUSCULAR; INTRAVENOUS at 11:09

## 2018-09-18 RX ADMIN — MEPERIDINE HYDROCHLORIDE 50 MG: 50 INJECTION INTRAMUSCULAR; INTRAVENOUS; SUBCUTANEOUS at 11:09

## 2018-09-18 RX ADMIN — HYDROMORPHONE HYDROCHLORIDE 1 MG: 2 INJECTION INTRAMUSCULAR; INTRAVENOUS; SUBCUTANEOUS at 07:09

## 2018-09-18 RX ADMIN — IPRATROPIUM BROMIDE AND ALBUTEROL SULFATE 3 ML: .5; 3 SOLUTION RESPIRATORY (INHALATION) at 07:09

## 2018-09-18 RX ADMIN — CISATRACURIUM BESYLATE 10 MG: 2 INJECTION INTRAVENOUS at 11:09

## 2018-09-18 RX ADMIN — ERTAPENEM SODIUM 1 G: 1 INJECTION, POWDER, LYOPHILIZED, FOR SOLUTION INTRAMUSCULAR; INTRAVENOUS at 11:09

## 2018-09-18 RX ADMIN — SODIUM CHLORIDE, POTASSIUM CHLORIDE, SODIUM LACTATE AND CALCIUM CHLORIDE: 600; 310; 30; 20 INJECTION, SOLUTION INTRAVENOUS at 01:09

## 2018-09-18 RX ADMIN — SODIUM CHLORIDE, POTASSIUM CHLORIDE, SODIUM LACTATE AND CALCIUM CHLORIDE: 600; 310; 30; 20 INJECTION, SOLUTION INTRAVENOUS at 02:09

## 2018-09-18 RX ADMIN — SODIUM CHLORIDE, POTASSIUM CHLORIDE, SODIUM LACTATE AND CALCIUM CHLORIDE: 600; 310; 30; 20 INJECTION, SOLUTION INTRAVENOUS at 10:09

## 2018-09-18 RX ADMIN — NEOSTIGMINE METHYLSULFATE 4 MG: 1 INJECTION INTRAVENOUS at 02:09

## 2018-09-18 RX ADMIN — ACETAMINOPHEN 1000 MG: 10 INJECTION, SOLUTION INTRAVENOUS at 10:09

## 2018-09-18 RX ADMIN — HEPARIN SODIUM 5000 UNITS: 5000 INJECTION, SOLUTION INTRAVENOUS; SUBCUTANEOUS at 10:09

## 2018-09-18 RX ADMIN — HYDROMORPHONE HYDROCHLORIDE 1 MG: 2 INJECTION INTRAMUSCULAR; INTRAVENOUS; SUBCUTANEOUS at 10:09

## 2018-09-18 RX ADMIN — GLYCOPYRROLATE 0.6 MG: 0.2 INJECTION INTRAMUSCULAR; INTRAVENOUS at 02:09

## 2018-09-18 RX ADMIN — SODIUM CHLORIDE, POTASSIUM CHLORIDE, SODIUM LACTATE AND CALCIUM CHLORIDE 125 ML/HR: 600; 310; 30; 20 INJECTION, SOLUTION INTRAVENOUS at 03:09

## 2018-09-18 RX ADMIN — KETOROLAC TROMETHAMINE 15 MG: 30 INJECTION, SOLUTION INTRAMUSCULAR at 04:09

## 2018-09-18 RX ADMIN — ONDANSETRON 4 MG: 2 INJECTION INTRAMUSCULAR; INTRAVENOUS at 11:09

## 2018-09-18 RX ADMIN — HYDROMORPHONE HYDROCHLORIDE 0.4 MG: 2 INJECTION INTRAMUSCULAR; INTRAVENOUS; SUBCUTANEOUS at 02:09

## 2018-09-18 RX ADMIN — ACETAMINOPHEN 1000 MG: 10 INJECTION, SOLUTION INTRAVENOUS at 04:09

## 2018-09-18 RX ADMIN — ONDANSETRON HYDROCHLORIDE 4 MG: 2 INJECTION, SOLUTION INTRAMUSCULAR; INTRAVENOUS at 07:09

## 2018-09-18 RX ADMIN — ERTAPENEM SODIUM 1 G: 1 INJECTION, POWDER, LYOPHILIZED, FOR SOLUTION INTRAMUSCULAR; INTRAVENOUS at 04:09

## 2018-09-18 RX ADMIN — SODIUM CHLORIDE: 9 INJECTION, SOLUTION INTRAVENOUS at 08:09

## 2018-09-18 RX ADMIN — SUCCINYLCHOLINE CHLORIDE 120 MG: 20 INJECTION, SOLUTION INTRAMUSCULAR; INTRAVENOUS at 11:09

## 2018-09-18 RX ADMIN — CEFAZOLIN SODIUM 2 G: 2 SOLUTION INTRAVENOUS at 11:09

## 2018-09-18 RX ADMIN — CISATRACURIUM BESYLATE 4 MG: 2 INJECTION INTRAVENOUS at 01:09

## 2018-09-18 RX ADMIN — SODIUM CHLORIDE, POTASSIUM CHLORIDE, SODIUM LACTATE AND CALCIUM CHLORIDE: 600; 310; 30; 20 INJECTION, SOLUTION INTRAVENOUS at 11:09

## 2018-09-18 RX ADMIN — CISATRACURIUM BESYLATE 2 MG: 2 INJECTION INTRAVENOUS at 02:09

## 2018-09-18 NOTE — ANESTHESIA POSTPROCEDURE EVALUATION
Anesthesia Post Evaluation    Patient: Paolo Brower    Procedure(s) Performed: Procedure(s) (LRB):  COLECTOMY,SIGMOID (N/A)  CYSTECTOMY, PARTIAL (N/A)    Final Anesthesia Type: general  Patient location during evaluation: PACU  Patient participation: Yes- Able to Participate  Level of consciousness: awake and awake and alert  Post-procedure vital signs: reviewed and stable  Pain management: adequate  Airway patency: patent  PONV status at discharge: No PONV  Anesthetic complications: no      Cardiovascular status: blood pressure returned to baseline  Respiratory status: unassisted and spontaneous ventilation  Hydration status: euvolemic  Follow-up not needed.        Visit Vitals  BP (!) 143/87   Pulse 96   Temp 36.6 °C (97.9 °F)   Resp 14   Wt 114.8 kg (253 lb)   SpO2 97%   BMI 32.48 kg/m²       Pain/Ortiz Score: Pain Assessment Performed: Yes (9/18/2018  3:10 PM)  Presence of Pain: denies (9/17/2018  9:28 AM)  Pain Rating Prior to Med Admin: 7 (9/18/2018  4:30 PM)  Ortiz Score: 9 (9/18/2018  3:26 PM)

## 2018-09-18 NOTE — ANESTHESIA PREPROCEDURE EVALUATION
09/18/2018  Paolo Brower is a 48 y.o., male.    Pre-op Assessment    I have reviewed the Patient Summary Reports.    I have reviewed the Nursing Notes.   I have reviewed the Medications.     Review of Systems  Anesthesia Hx:  No problems with previous Anesthesia  Neg history of prior surgery. Denies Family Hx of Anesthesia complications.   Denies Personal Hx of Anesthesia complications.   Social:  Former Smoker    Hematology/Oncology:  Hematology Normal   Oncology Normal     EENT/Dental:EENT/Dental Normal   Cardiovascular:  Cardiovascular Normal     Pulmonary:  Pulmonary Normal    Renal/:  Renal/ Normal     Hepatic/GI:  Hepatic/GI Normal    Musculoskeletal:  Musculoskeletal Normal    Neurological:  Neurology Normal    Dermatological:  Skin Normal    Psych:  Psychiatric Normal           Physical Exam  General:  Well nourished    Airway/Jaw/Neck:  AIRWAY FINDINGS: Normal      Eyes/Ears/Nose:  EYES/EARS/NOSE FINDINGS: Normal   Dental:  DENTAL FINDINGS: Normal   Chest/Lungs:  Chest/Lungs Clear    Heart/Vascular:  Heart Findings: Normal Heart murmur: negative Vascular Findings: Normal    Abdomen:  Abdomen Findings: Normal    Musculoskeletal:  Musculoskeletal Findings: Normal   Skin:  Skin Findings: Normal         Anesthesia Plan  Type of Anesthesia, risks & benefits discussed:  Anesthesia Type:  general  Patient's Preference:   Intra-op Monitoring Plan: standard ASA monitors  Intra-op Monitoring Plan Comments:   Post Op Pain Control Plan:   Post Op Pain Control Plan Comments:   Induction:   IV  Beta Blocker:  Patient is not currently on a Beta-Blocker (No further documentation required).       Informed Consent: Patient understands risks and agrees with Anesthesia plan.  Questions answered. Anesthesia consent signed with patient.  ASA Score: 2     Day of Surgery Review of History & Physical:    H&P update  referred to the provider.

## 2018-09-18 NOTE — OP NOTE
[facility] - Periop Services  Operative Note     SUMMARY     Surgery Date: 9/18/2018     Primary Surgeon: Rene Kinney MD    Assistant Surgeon: Oj Corea MD    Pre-op Diagnosis:  Colovesical fistula [N32.1]    Post-op Diagnosis:  Post-Op Diagnosis Codes:     * Colovesical fistula [N32.1]    Operation:  1) Sigmoid Colectomy with Low Anterior Proximal Proctectomy and [place]-rectal anastomosis 82016   2) Complex bladder repair 26866  3) Omental pedical flap creation and utilization 77479   4) Splenic flexure of colon mobilization/takedown 82356    Modifier 22:  This case was significantly more difficult than other cases of the same coating.  Significant anterior and lateral pelvic adhesive disease from chronic diverticulitis with a diagnosed in known colovesicular fistula requiring preoperative ureteral stents placed as well as intraoperative extensive lysis of adhesions.  Therefore and placed in the 22 modifier to this case as it is significantly more difficult than 60-75% of other cases of similar variety.    Description of the findings of the procedure:  Colovesical fistula [N32.1]      Estimated Blood Loss: 100 mL         Specimens:   Specimen (12h ago, onward)    Start     Ordered    09/18/18 1415  Specimen to Pathology - Surgery  Once     Comments:  Pre-op Diagnosis: Colovesical fistula [N32.1]Post-op Diagnosis: SAME Procedure(s):COLECTOMYCYSTECTOMY, PARTIALSURGICAL PROCEDURE, COLONOSCOPIC Number of specimens: 1Name of specimens: SIGMOID COLON     Start Status   09/18/18 1415 Collected (09/18/18 1415)       09/18/18 1414        Abx: Invanz 1 gram    Anesthesia: GETA    DATE OF PROCEDURE:  09/18/2018    INDICATION FOR PROCEDURE:  Mr. Brower is a 48-year-old male who presented  to my clinic as a referral from Dr. Issa for an evaluation of possible  colovesicular fistula.  The patient underwent preoperative CT scan with  rectal contrast, which confirmed colovesicular fistula.  The  patient had  symptoms of left groin pain, left testicular pain and suprapubic pain.  No  issues of dysuria or pneumaturia per his account.  The patient underwent  preoperative colonoscopy, which showed significant diverticular disease of  the sigmoid colon with significant tortuosity of the sigmoid colon.   Further, he underwent preoperative cystoscopy with bilateral retrograde  double-J stents.  Cystoscopy confirmed evidence of colovesicular fistula on  the dome of the left anterior dome.  The patient was informed of the risks  and benefits of operative intervention including bleeding, infection,  anastomotic failure, possible colostomy, possible temporary colostomy,  possible intraabdominal sepsis, possible injury to surrounding structures  including the bowel, small bowel, colon, ureter, bladder, nonhealing of  bladder, nonhealing of anastomosis, etc.  The patient also was informed of  the intrinsic risk of anesthesia as well as the Anesthesia provider  discussed with the patient including that of heart attack, strokes and  death.  The patient understands all of the above risk and wished to proceed  to the Operating Room for colovesicular fistula takedown, sigmoid colon  resection, bladder repair, etc.  Risks and benefits discussed even today in  preoperative holding, he wished to proceed.    PROCEDURE IN DETAIL:  The patient was seen in the preoperative holding  area.  Risks and benefits again of operative intervention were discussed.   He wished to proceed today by signing informed consent.  Preoperatively,  the patient underwent bowel prep with GoLYTELY.  He suggested that he was  running clear per his account.  He was brought back to the Operative Room,  placed on the table in supine position.  General anesthesia was introduced  via an endotracheal tube.  Prior to operative intervention an NG tube as  well as a Joyce were placed.  The patient's abdomen was then prepped and  draped in standard sterile surgical  fashion.  The patient was given 1 g  Invanz for preoperative antibiotics within an hour of skin incision.  A  timeout was conducted in the Operative Room with all in the Operative in  agreement a correct patient, correct procedure, correct allergies and  correct antibiotics.    I then used a 10 blade to make a skin incision the patient's previous  laparotomy incision from previous appendectomy.  Skin incision was carried  down to fascia with Bovie electrocautery.  Fascia was incised with Bovie  electrocautery.  They are previous nylon sutures in for fascial closure.   These were removed.  Kocher's were used to elevate the fascia.  Omental  caking was noted on the posterior aspect of the anterior abdominal wall.   This was taken down sharply with Bovie electrocautery.  Omentum was placed  above the stomach at this point.  At this point, the left colon and The  white line of Toldt was taken down.  Splenic flexure was taken down with  Bovie electrocautery and blunt dissection.  Visualization of the pelvis  showed significant adhesive disease from previous diverticular flare as  well as an anterior left colovesicular fistula.  Meticulous dissection was  conducted of this area within the pelvis.  This is extremely dense and  extremely adherent.  Prior to dissection, the left ureter was visualized in  the left pericolic gutter running down into the pelvis.  The stent placed  preoperatively was felt within the left ureter itself.  Continued  meticulous dissection was conducted of the pelvis, sigmoid colon and  bladder.  After extensive meticulous dissection, colovesicular fistula was  taken down.  The spot on the sigmoid colon that was adherent to the bladder  was on the antimesenteric side.  At this point, a decision was made for  repair of a bladder site, left anterior dome.  A 3-0 Vicryl suture was used  in a running locking fashion to close the inside layer of the bladder.   Additionally, 3-0 Vicryl sutures were used  in a simple interrupted fashion  over top to close this and a second layer.  This was a complex bladder  closure.  After closure of the bladder fistula site, the bladder was  instilled with approximately 400 mL of Enfamil.  There was no evidence of  leak from the bladder colovesicular site.  Bladder was then drained.    Attention was then turned towards the sigmoid colon.  The distal sigmoid  colon previously tattooed during colonoscopy was visualized.  This was  actually a proximal rectum.  This was below the pelvic brim.  Bovie  electrocautery was used on either side to the proximal rectum to traverse  the peritoneal reflections.  Bovie electrocautery was then used to make a  mesenteric window underneath the proximal rectum.  A blue load RIRI stapler  was used to transect the rectum.  Spot on the proximal sigmoid colon was  visualized to be healthy bowel.  Again, in the same fashion, Bovie  electrocautery was used to make a submesenteric window.  A blue load RIRI  stapler was used to transect the colon.  LigaSure device was then used to  transect the mesentery of the proximal rectum and sigmoid colon to be  removed as a diseased colon.  This was taken high along the colon mesentery  on the colon.  Colon was then handed off as specimen.  After previous  mobilization of the splenic flexure, mobilization of the left colon and  along The White line of Toldt, the left colon was mobile enough to perform  a proximal side to distal end anastomosis.  This was below the pelvic brim.   Decision was made to perform a handsewn 2-layer anastomosis.  Laparotomy  pads were placed around the proposed anastomosis.  2-0 silk sutures were  used in a simple interrupted fashion on the posterior wall of the rectum  and left colon.  These were tied down in a simple interrupted fashion.  The  previous staple line on the rectum was removed with Bovie electrocautery  Asepto as well as pool tip sucker used to irrigate out the rectum to  allow  for minimal spillage.  Same was done on the proximal colon.  A transverse  colotomy was created along the tinea.  Again, pull tip sucker and Asepto  was used to irrigate out the colon lumen to allow for minimal  contamination.  The inner layer of the anastomosis was performed with 3-0  PDS sutures in the posterior layer.  These sutures were in a running  locking fashion on the anterior layer.  The sutures were canaled.  They  were tied in the posterior midline and the anterior midline.  One suture  was ran on either way.  On the anterior and lateral walls of the  anastomosis, 2-0 silk sutures were used again in a simple Lembert fashion  to close the second layer in a simple fashion.  After creation the  anastomosis was widely patent to 2 fingers.  After completion of  anastomosis, a bowel clamp was placed on the left colon.  My partner, Dr. Corea then performed an on-table colonoscopy, please see separate note.   In short, the anastomosis was widely patent.  Scope easily passed.  There  was no evidence of air leak with pelvis completely filled with irrigant and  anastomosis completely submerged.  The colon was decompressed with a  colonoscope.  The pelvis was washed out with  irrigant.     At this point, attention was turned towards creation of an omental pedicle  flap for placement on bladder repair and anterior to colon anastomosis to  prevent against recurrent fistulization.  The omentum was pulled from  anterior to the stomach back into the operative field.  LigaSure device was  used to transect the omentum in half.  The left side of the omentum was  flat in the left pericolic gutter and anterior to the anastomosis.  This  left omental pedicle flap had fixed to the previous bladder repair with  three 3-0 Vicryl sutures in a simple interrupted fashion.  The remainder of  the omentum was placed over the small bowel.  The NG tube was confirmed to  be within the stomach.  Abdomen was washed out with   irrigant.  All  counts were correct including lap pads, instruments as well as needles  prior to abdominal fascial closure.  Abdominal fascial closure was then  performed with a running #1 PDS from superior and inferior and tied in the  middle.  The skin and soft tissue was irrigated out with a Pulsavac with  approximately 3 liters of normal saline, a 40 mL of diluted Exparel were  then used in the anterior abdominal musculature around the surgical  incision for adequate postoperative local anesthetics.  Skin staples were  then placed over top for skin closure.  Dry dressings were placed over top  as well as tape.  The patient tolerated the procedure well.  He was  successfully extubated in the OR, transferred back to the Postoperative  Care Unit in stable condition.    Again after abdominal wall as well as skin closure, new counts were  performed, which showed correct counts of the lap pads, instruments as well  as needles.  Plan will be for admission to the ICU, continued postoperative  Invanz therapy given acute on chronic inflammation, infection with  colovesicular fistula, adequate DVT prophylaxis both mechanically and  pharmacologically, continue NG tube decompression and bowel rest and  adequate pain control.  The patient will also maintain his Joyce catheter  for at least seven days with a possible cystogram at the end of seven days  to ensure no bladder leak.        FELY/CONNER dd: 09/18/2018 15:20:02 (CDT)   td: 09/19/2018 00:18:11 (CDT)  Doc ID #[id number]   Job ID #[id number]    CC:            [id number]

## 2018-09-18 NOTE — H&P
[facility] Medicine  History & Physical    Patient Name: Paolo Brower  MRN: [id number]  Admission Date: 9/18/2018  Attending Physician: Del Basilio MD  Primary Care Provider: Tyrone Issa MD         Patient information was obtained from patient and ER records.     Subjective:     Principal Problem:Colovesical fistula    Chief Complaint:   Chief Complaint   Patient presents with    Diverticulitis    Post-op Problem        HPI: This is a 48-year-old male that is status post partial colectomy due to diverticular disease. Patient had a colovesicular fistula and was corrected today by Dr. Kinney.  I have been asked to admit this patient to my service for medical management and for surgical consult.  This patient surgery day was elective surgery and not emergent.  Patient has no known past medical history other than this current diagnosis and has been otherwise healthy.  Patient is doing well postoperatively with adequate pain management and otherwise no significant complaints.  He has no known drug allergies.  He takes no current medications.    Past Medical History:   Diagnosis Date    Diverticular stricture     Diverticulitis     Known health problems: none        Past Surgical History:   Procedure Laterality Date    APPENDECTOMY      COLONOSCOPY  03/26/2018    CYSTOSCOPY W/ URETERAL STENT PLACEMENT Bilateral 9/12/2018    Procedure: CYSTOSCOPY, WITH URETERAL STENT INSERTION;  Surgeon: Charli Shaikh MD;  Location: [facility] OR;  Service: Urology;  Laterality: Bilateral;    CYSTOSCOPY, WITH URETERAL STENT INSERTION Bilateral 9/12/2018    Performed by Charli Shaikh MD at [facility] OR       Review of patient's allergies indicates:  No Known Allergies    No current facility-administered medications on file prior to encounter.      No current outpatient medications on file prior to encounter.     Family History     Problem Relation (Age of Onset)    Cancer Father     Diabetes Mother    Hypertension Mother        Tobacco Use    Smoking status: Former Smoker     Packs/day: 1.00     Types: Cigarettes     Last attempt to quit: 2018     Years since quittin.1    Smokeless tobacco: Never Used    Tobacco comment: quit 3 weeks ago   Substance and Sexual Activity    Alcohol use: No    Drug use: No    Sexual activity: Yes     Partners: Female     Review of Systems   Constitutional: Negative for activity change, appetite change, fatigue and fever.   HENT: Negative for congestion, ear discharge, mouth sores, nosebleeds, rhinorrhea, sinus pressure, sinus pain and tinnitus.    Eyes: Negative.  Negative for pain, redness and itching.   Respiratory: Negative for apnea, cough, choking, chest tightness, shortness of breath, wheezing and stridor.    Cardiovascular: Negative for chest pain, palpitations and leg swelling.   Gastrointestinal: Positive for abdominal pain and nausea. Negative for abdominal distention, anal bleeding, blood in stool, constipation and diarrhea.   Endocrine: Negative.    Genitourinary: Negative for difficulty urinating, flank pain, frequency and urgency.   Musculoskeletal: Negative for arthralgias, back pain, gait problem and myalgias.   Skin: Negative for color change and pallor.   Allergic/Immunologic: Negative.    Neurological: Positive for numbness. Negative for dizziness, facial asymmetry, weakness, light-headedness and headaches.        Patient did complain of numbness in his shoulders down to his hands bilaterally. No motor difficulty at all   Hematological: Negative for adenopathy. Does not bruise/bleed easily.   Psychiatric/Behavioral: The patient is nervous/anxious.      Objective:     Vital Signs (Most Recent):  Temp: 97.7 °F (36.5 °C) (18)  Pulse: 110 (18)  Resp: 13 (18)  BP: (!) 147/96 (18)  SpO2: 100 % (18) Vital Signs (24h Range):  Temp:  [97.7 °F (36.5 °C)-98.3 °F (36.8 °C)] 97.7 °F (36.5  °C)  Pulse:  [] 110  Resp:  [10-18] 13  SpO2:  [94 %-100 %] 100 %  BP: (112-149)/() 147/96     Weight: 119.7 kg (264 lb)  Body mass index is 33.9 kg/m².    Physical Exam   Constitutional: He is oriented to person, place, and time. He appears well-developed and well-nourished.   HENT:   Head: Normocephalic and atraumatic.   Eyes: EOM are normal. Pupils are equal, round, and reactive to light.   Neck: Normal range of motion. Neck supple. No tracheal deviation present. No thyromegaly present.   Pulmonary/Chest: Effort normal and breath sounds normal.   Abdominal: Soft. Bowel sounds are normal. He exhibits no distension. There is tenderness. There is guarding. There is no rebound.   Musculoskeletal: Normal range of motion.   Lymphadenopathy:     He has no cervical adenopathy.   Neurological: He is alert and oriented to person, place, and time.   Skin: Skin is warm and dry. Capillary refill takes less than 2 seconds.   Psychiatric: He has a normal mood and affect. His behavior is normal. Judgment and thought content normal.   Nursing note and vitals reviewed.        CRANIAL NERVES     CN III, IV, VI   Pupils are equal, round, and reactive to light.  Extraocular motions are normal.        Significant Labs: All pertinent labs within the past 24 hours have been reviewed.    Significant Imaging: I have reviewed and interpreted all pertinent imaging results/findings within the past 24 hours.    Assessment/Plan:     * Colovesical fistula    9/18/2018:  Op Day:  1.  Continue current pain control  2.  Repeat CBC, CMP in the morning  3.  Follow with any medical issues that arise.    Like to thank you Dr. Kinney for allowing me to be involved the care of this patient!            VTE Risk Mitigation (From admission, onward)        Ordered     heparin (porcine) injection 5,000 Units  Every 8 hours      09/18/18 1615     IP VTE HIGH RISK PATIENT  Once      09/18/18 1615     Place BRUNO hose  Until discontinued       09/18/18 1615     Place sequential compression device  Until discontinued      09/18/18 1615             Del Basilio MD  Department of Hospital Medicine   [facility] - ICU

## 2018-09-18 NOTE — SUBJECTIVE & OBJECTIVE
Past Medical History:   Diagnosis Date    Diverticular stricture     Diverticulitis     Known health problems: none        Past Surgical History:   Procedure Laterality Date    APPENDECTOMY      COLONOSCOPY  2018    CYSTOSCOPY W/ URETERAL STENT PLACEMENT Bilateral 2018    Procedure: CYSTOSCOPY, WITH URETERAL STENT INSERTION;  Surgeon: Charli Shaikh MD;  Location: [facility] OR;  Service: Urology;  Laterality: Bilateral;    CYSTOSCOPY, WITH URETERAL STENT INSERTION Bilateral 2018    Performed by Charli Shaikh MD at [facility] OR       Review of patient's allergies indicates:  No Known Allergies    No current facility-administered medications on file prior to encounter.      No current outpatient medications on file prior to encounter.     Family History     Problem Relation (Age of Onset)    Cancer Father    Diabetes Mother    Hypertension Mother        Tobacco Use    Smoking status: Former Smoker     Packs/day: 1.00     Types: Cigarettes     Last attempt to quit: 2018     Years since quittin.1    Smokeless tobacco: Never Used    Tobacco comment: quit 3 weeks ago   Substance and Sexual Activity    Alcohol use: No    Drug use: No    Sexual activity: Yes     Partners: Female     Review of Systems   Constitutional: Negative for activity change, appetite change, fatigue and fever.   HENT: Negative for congestion, ear discharge, mouth sores, nosebleeds, rhinorrhea, sinus pressure, sinus pain and tinnitus.    Eyes: Negative.  Negative for pain, redness and itching.   Respiratory: Negative for apnea, cough, choking, chest tightness, shortness of breath, wheezing and stridor.    Cardiovascular: Negative for chest pain, palpitations and leg swelling.   Gastrointestinal: Positive for abdominal pain and nausea. Negative for abdominal distention, anal bleeding, blood in stool, constipation and diarrhea.   Endocrine: Negative.    Genitourinary: Negative for difficulty urinating, flank pain,  frequency and urgency.   Musculoskeletal: Negative for arthralgias, back pain, gait problem and myalgias.   Skin: Negative for color change and pallor.   Allergic/Immunologic: Negative.    Neurological: Positive for numbness. Negative for dizziness, facial asymmetry, weakness, light-headedness and headaches.        Patient did complain of numbness in his shoulders down to his hands bilaterally. No motor difficulty at all   Hematological: Negative for adenopathy. Does not bruise/bleed easily.   Psychiatric/Behavioral: The patient is nervous/anxious.      Objective:     Vital Signs (Most Recent):  Temp: 97.7 °F (36.5 °C) (09/18/18 1745)  Pulse: 110 (09/18/18 1745)  Resp: 13 (09/18/18 1745)  BP: (!) 147/96 (09/18/18 1745)  SpO2: 100 % (09/18/18 1745) Vital Signs (24h Range):  Temp:  [97.7 °F (36.5 °C)-98.3 °F (36.8 °C)] 97.7 °F (36.5 °C)  Pulse:  [] 110  Resp:  [10-18] 13  SpO2:  [94 %-100 %] 100 %  BP: (112-149)/() 147/96     Weight: 119.7 kg (264 lb)  Body mass index is 33.9 kg/m².    Physical Exam   Constitutional: He is oriented to person, place, and time. He appears well-developed and well-nourished.   HENT:   Head: Normocephalic and atraumatic.   Eyes: EOM are normal. Pupils are equal, round, and reactive to light.   Neck: Normal range of motion. Neck supple. No tracheal deviation present. No thyromegaly present.   Pulmonary/Chest: Effort normal and breath sounds normal.   Abdominal: Soft. Bowel sounds are normal. He exhibits no distension. There is tenderness. There is guarding. There is no rebound.   Musculoskeletal: Normal range of motion.   Lymphadenopathy:     He has no cervical adenopathy.   Neurological: He is alert and oriented to person, place, and time.   Skin: Skin is warm and dry. Capillary refill takes less than 2 seconds.   Psychiatric: He has a normal mood and affect. His behavior is normal. Judgment and thought content normal.   Nursing note and vitals reviewed.        CRANIAL NERVES      CN III, IV, VI   Pupils are equal, round, and reactive to light.  Extraocular motions are normal.        Significant Labs: All pertinent labs within the past 24 hours have been reviewed.    Significant Imaging: I have reviewed and interpreted all pertinent imaging results/findings within the past 24 hours.

## 2018-09-18 NOTE — PLAN OF CARE
Problem: Patient Care Overview  Goal: Plan of Care Review  Outcome: Ongoing (interventions implemented as appropriate)  On going  Goal: Individualization & Mutuality  Outcome: Ongoing (interventions implemented as appropriate)  On going  Goal: Discharge Needs Assessment  Outcome: Ongoing (interventions implemented as appropriate)  On going  Goal: Interdisciplinary Rounds/Family Conf  Outcome: Ongoing (interventions implemented as appropriate)  On going    Problem: Infection, Risk/Actual (Adult)  Intervention: Manage Suspected/Actual Infection  On going    Goal: Identify Related Risk Factors and Signs and Symptoms  Related risk factors and signs and symptoms are identified upon initiation of Human Response Clinical Practice Guideline (CPG)  Outcome: Ongoing (interventions implemented as appropriate)  On going  Goal: Infection Prevention/Resolution  Patient will demonstrate the desired outcomes by discharge/transition of care.  Outcome: Ongoing (interventions implemented as appropriate)  On going

## 2018-09-18 NOTE — PLAN OF CARE
1510-pt brought to icu, remains drowsy, site assessed at bedside w/ icu,maciel. [place]en patent & intact, secured to rt thigh.

## 2018-09-18 NOTE — HPI
This is a 48-year-old male that is status post partial colectomy due to diverticular disease. Patient had a colovesicular fistula and was corrected today by Dr. Kinney.  I have been asked to admit this patient to my service for medical management and for surgical consult.  This patient surgery day was elective surgery and not emergent.  Patient has no known past medical history other than this current diagnosis and has been otherwise healthy.  Patient is doing well postoperatively with adequate pain management and otherwise no significant complaints.  He has no known drug allergies.  He takes no current medications.

## 2018-09-18 NOTE — PLAN OF CARE
09/18/18 1729   Discharge Assessment   Assessment Type Discharge Planning Assessment   Confirmed/corrected address and phone number on facesheet? Yes   Assessment information obtained from? Patient   Expected Length of Stay (days) 5   Communicated expected length of stay with patient/caregiver yes   Prior to hospitilization cognitive status: Alert/Oriented   Prior to hospitalization functional status: Independent   Current cognitive status: Alert/Oriented   Current Functional Status: Independent   Lives With spouse;child(romelia), adult;child(romelia), dependent   Able to Return to Prior Arrangements yes   Is patient able to care for self after discharge? Yes   Who are your caregiver(s) and their phone number(s)? Kassidy Brower spouse [phone]   Patient's perception of discharge disposition home or selfcare   Readmission Within The Last 30 Days no previous admission in last 30 days   Patient currently being followed by outpatient case management? No   Patient currently receives any other outside agency services? No   Equipment Currently Used at Home none   Do you have any problems affording any of your prescribed medications? No   Is the patient taking medications as prescribed? yes   Does the patient have transportation home? Yes   Transportation Available family or friend will provide   Does the patient receive services at the [facility]? No   Discharge Plan A Home with family   Discharge Plan B Home Health   Patient/Family In Agreement With Plan yes   Patient lives at home with wife & their 22yr old & 16yr old children. States doesn't think he will need home health when discharged. Informed that I will continue to follow his care & if changes his mind can always set it up. Demonstrated understanding by verbal feedback. Denies any discharge needs at this time.

## 2018-09-18 NOTE — TRANSFER OF CARE
Anesthesia Transfer of Care Note    Patient: Paolo Brower    Procedure(s) Performed: Procedure(s) (LRB):  COLECTOMY,SIGMOID (N/A)  CYSTECTOMY, PARTIAL (N/A)    Patient location: ICU    Anesthesia Type: general    Transport from OR: Transported from OR on 2-3 L/min O2 by NC with adequate spontaneous ventilation    Post pain: adequate analgesia    Post assessment: no apparent anesthetic complications and tolerated procedure well    Post vital signs: stable    Level of consciousness: awake, alert and oriented    Nausea/Vomiting: no nausea/vomiting    Complications: none    Transfer of care protocol was followed      Last vitals:   Visit Vitals  BP (!) 149/76   Pulse 94   Temp 36.7 °C (98 °F)   Resp 17   Wt 114.8 kg (253 lb)   SpO2 (!) 94%   BMI 32.48 kg/m²

## 2018-09-18 NOTE — BRIEF OP NOTE
[facility] - Periop Services  Brief Operative Note     SUMMARY     Surgery Date: 9/18/2018     Primary Surgeon: Rene Kinney MD    Assistant Surgeon: Oj Corea MD    Pre-op Diagnosis:  Colovesical fistula [N32.1]    Post-op Diagnosis:  Post-Op Diagnosis Codes:     * Colovesical fistula [N32.1]    Operation:  1) Sigmoid Colectomy with Low Anterior Proximal Proctectomy and [place]-rectal anastomosis 16759   2) Complex bladder repair 04080  3) Omental pedical flap creation and utilization 49206   4) Splenic flexure of colon mobilization/takedown 68836    Modifier 22:  This case was significantly more difficult than other cases of the same coating.  Significant anterior and lateral pelvic adhesive disease from chronic diverticulitis with a diagnosed in known colovesicular fistula requiring preoperative ureteral stents placed as well as intraoperative extensive lysis of adhesions.  Therefore and placed in the 22 modifier to this case as it is significantly more difficult than 60-75% of other cases of similar variety.    Description of the findings of the procedure:  Colovesical fistula [N32.1]      Estimated Blood Loss: 100 mL         Specimens:   Specimen (12h ago, onward)    Start     Ordered    09/18/18 1415  Specimen to Pathology - Surgery  Once     Comments:  Pre-op Diagnosis: Colovesical fistula [N32.1]Post-op Diagnosis: SAME Procedure(s):COLECTOMYCYSTECTOMY, PARTIALSURGICAL PROCEDURE, COLONOSCOPIC Number of specimens: 1Name of specimens: SIGMOID COLON     Start Status   09/18/18 1415 Collected (09/18/18 1415)       09/18/18 1414        Abx: Invanz 1 gram    Anesthesia: GETA

## 2018-09-18 NOTE — ASSESSMENT & PLAN NOTE
9/18/2018:  Op Day:  1.  Continue current pain control  2.  Repeat CBC, CMP in the morning  3.  Follow with any medical issues that arise.    Like to thank you Dr. Kinney for allowing me to be involved the care of this patient!

## 2018-09-18 NOTE — HOSPITAL COURSE
Patient id was admitted this morning to the surgical suite and the surgery was performed by Dr. Kinney.  The surgery was without complications and he was transferred from recovery to the ICU for continued care.    Post Op Day 1:  Patient is doing well postop day 1 still having some pain as expected but otherwise is stable.  Some of this discussion with Dr. Kinney shows a plan to continue Joyce continue pain control and otherwise will follow as needed medically.  Patient is currently medically stable and having no other difficulty\    Postop day 2:  Patient is much improved this morning having less pain. No passing flatus yet.  Otherwise lab results appeared generally within normal ranges.  Patient has been active and up to the bedside in chair without difficulty    Post op day 3:  Patient has no complaints although not passing gas at this time.  Pain is well controlled and otherwise no new issues have come up.  Postop course is going well as expected    Postop day 4.:  Patient is passing gas well.  Patient has been up ambulatory and in the chair and incentive spirometer showing normal values.  No other complaints at this time.  09/22/2018..  Postop day 5.  The patient is doing well flatus is being passed she is eating a popsicle sitting up in bed very conversational.  Continue current hospital course management.  Labs have been reviewed.  CBC is pending at this time.  Continue current level of care.  09/23/2018.  Postop day 6.  The patient has no complaints today the patient is passing gas he is on liquid diet.  Patient has not had a bowel movement.  Patient is desiring to take a bath early swore she has had hair of discussed with him discussing with  and he will not take takes shower at this time.  Patient also discussed with Dr. Corea increasing his diet intake.  Discharge planning in progress per Dr. Corea.  Continue current level of care antibiotics and lab has been monitored.    9/24/2018L  1.   Discharge patient to home  2.  Advanced status tolerated  3.  Follow-up with Dr. Kinney on Thursday of this week.  4.  Call if any worsening or any changes.  Prescriptions given by Dr. Kinney

## 2018-09-18 NOTE — PLAN OF CARE
PT AWAITING PROCEDURE, PROCEDURE DELAY DUE TO PREVIOUS CASE. VERBALIZE UNDERSTANDING. WIFE AT BEDSIDE. WILL CONTINUE TO MONITOR. EVANGELISTA ARMIJO

## 2018-09-18 NOTE — PLAN OF CARE
Pt to ICU placed on continous montitoring, nurses at bedside. Report given by Ketan ARMIJO and anesthesia Jacqueline SOLIS, HOB AT 45 DEGREE. NG TO LEFT NARE PLACED ON CONTINUOUS LOW WALL SUCTION. ABDOMEN MILDLY DISTENTED. DRESSING MIDLINE DRY AND INTACT. IV LR AT KVO TO LEFT HAND WITH 900CC LTC. PARKINSON 16 FR TO GRAVITY WITH 100 OF MILKY YELLOW URINE. BRUNO/SCD. PT SNORING AROUSABLE WHEN CALLED. EVANGELISTA ARMIJO

## 2018-09-19 LAB
ANION GAP SERPL CALC-SCNC: 7 MMOL/L
BUN SERPL-MCNC: 11 MG/DL
CALCIUM SERPL-MCNC: 8.3 MG/DL
CHLORIDE SERPL-SCNC: 102 MMOL/L
CO2 SERPL-SCNC: 26 MMOL/L
CREAT SERPL-MCNC: 0.9 MG/DL
ERYTHROCYTE [DISTWIDTH] IN BLOOD BY AUTOMATED COUNT: 13 %
EST. GFR  (AFRICAN AMERICAN): >60 ML/MIN/1.73 M^2
EST. GFR  (NON AFRICAN AMERICAN): >60 ML/MIN/1.73 M^2
GLUCOSE SERPL-MCNC: 107 MG/DL
HCT VFR BLD AUTO: 40.2 %
HGB BLD-MCNC: 13.9 G/DL
MAGNESIUM SERPL-MCNC: 2.1 MG/DL
MCH RBC QN AUTO: 28.5 PG
MCHC RBC AUTO-ENTMCNC: 34.6 G/DL
MCV RBC AUTO: 82 FL
PLATELET # BLD AUTO: 294 K/UL
PMV BLD AUTO: 9.1 FL
POTASSIUM SERPL-SCNC: 4.1 MMOL/L
RBC # BLD AUTO: 4.88 M/UL
SODIUM SERPL-SCNC: 135 MMOL/L
WBC # BLD AUTO: 14.43 K/UL

## 2018-09-19 PROCEDURE — 25000003 PHARM REV CODE 250: Performed by: SURGERY

## 2018-09-19 PROCEDURE — 94799 UNLISTED PULMONARY SVC/PX: CPT

## 2018-09-19 PROCEDURE — 36415 COLL VENOUS BLD VENIPUNCTURE: CPT

## 2018-09-19 PROCEDURE — 85027 COMPLETE CBC AUTOMATED: CPT

## 2018-09-19 PROCEDURE — 20000000 HC ICU ROOM

## 2018-09-19 PROCEDURE — 94760 N-INVAS EAR/PLS OXIMETRY 1: CPT

## 2018-09-19 PROCEDURE — 27000221 HC OXYGEN, UP TO 24 HOURS

## 2018-09-19 PROCEDURE — 94640 AIRWAY INHALATION TREATMENT: CPT

## 2018-09-19 PROCEDURE — 63600175 PHARM REV CODE 636 W HCPCS: Performed by: SURGERY

## 2018-09-19 PROCEDURE — 83735 ASSAY OF MAGNESIUM: CPT

## 2018-09-19 PROCEDURE — 80048 BASIC METABOLIC PNL TOTAL CA: CPT

## 2018-09-19 PROCEDURE — 94761 N-INVAS EAR/PLS OXIMETRY MLT: CPT

## 2018-09-19 PROCEDURE — A4216 STERILE WATER/SALINE, 10 ML: HCPCS | Performed by: SURGERY

## 2018-09-19 PROCEDURE — 25000242 PHARM REV CODE 250 ALT 637 W/ HCPCS: Performed by: SURGERY

## 2018-09-19 PROCEDURE — C9113 INJ PANTOPRAZOLE SODIUM, VIA: HCPCS | Performed by: SURGERY

## 2018-09-19 RX ORDER — ACETAMINOPHEN 10 MG/ML
1000 INJECTION, SOLUTION INTRAVENOUS EVERY 8 HOURS
Status: COMPLETED | OUTPATIENT
Start: 2018-09-19 | End: 2018-09-20

## 2018-09-19 RX ORDER — KETOROLAC TROMETHAMINE 30 MG/ML
15 INJECTION, SOLUTION INTRAMUSCULAR; INTRAVENOUS EVERY 6 HOURS
Status: COMPLETED | OUTPATIENT
Start: 2018-09-19 | End: 2018-09-21

## 2018-09-19 RX ADMIN — HEPARIN SODIUM 5000 UNITS: 5000 INJECTION, SOLUTION INTRAVENOUS; SUBCUTANEOUS at 09:09

## 2018-09-19 RX ADMIN — ACETAMINOPHEN 1000 MG: 10 INJECTION, SOLUTION INTRAVENOUS at 09:09

## 2018-09-19 RX ADMIN — IPRATROPIUM BROMIDE AND ALBUTEROL SULFATE 3 ML: .5; 3 SOLUTION RESPIRATORY (INHALATION) at 12:09

## 2018-09-19 RX ADMIN — HEPARIN SODIUM 5000 UNITS: 5000 INJECTION, SOLUTION INTRAVENOUS; SUBCUTANEOUS at 01:09

## 2018-09-19 RX ADMIN — HYDROMORPHONE HYDROCHLORIDE 1 MG: 2 INJECTION INTRAMUSCULAR; INTRAVENOUS; SUBCUTANEOUS at 07:09

## 2018-09-19 RX ADMIN — HYDROMORPHONE HYDROCHLORIDE 1 MG: 2 INJECTION INTRAMUSCULAR; INTRAVENOUS; SUBCUTANEOUS at 12:09

## 2018-09-19 RX ADMIN — HYDROMORPHONE HYDROCHLORIDE 1 MG: 2 INJECTION INTRAMUSCULAR; INTRAVENOUS; SUBCUTANEOUS at 09:09

## 2018-09-19 RX ADMIN — ACETAMINOPHEN 1000 MG: 10 INJECTION, SOLUTION INTRAVENOUS at 05:09

## 2018-09-19 RX ADMIN — Medication 20 ML: at 09:09

## 2018-09-19 RX ADMIN — HYDROMORPHONE HYDROCHLORIDE 1 MG: 2 INJECTION INTRAMUSCULAR; INTRAVENOUS; SUBCUTANEOUS at 02:09

## 2018-09-19 RX ADMIN — KETOROLAC TROMETHAMINE 15 MG: 30 INJECTION, SOLUTION INTRAMUSCULAR; INTRAVENOUS at 11:09

## 2018-09-19 RX ADMIN — PANTOPRAZOLE SODIUM 40 MG: 40 INJECTION, POWDER, LYOPHILIZED, FOR SOLUTION INTRAVENOUS at 09:09

## 2018-09-19 RX ADMIN — HYDROMORPHONE HYDROCHLORIDE 1 MG: 2 INJECTION INTRAMUSCULAR; INTRAVENOUS; SUBCUTANEOUS at 05:09

## 2018-09-19 RX ADMIN — Medication: at 03:09

## 2018-09-19 RX ADMIN — IPRATROPIUM BROMIDE AND ALBUTEROL SULFATE 3 ML: .5; 3 SOLUTION RESPIRATORY (INHALATION) at 07:09

## 2018-09-19 RX ADMIN — ERTAPENEM SODIUM 1 G: 1 INJECTION, POWDER, LYOPHILIZED, FOR SOLUTION INTRAMUSCULAR; INTRAVENOUS at 03:09

## 2018-09-19 RX ADMIN — Medication 10 ML: at 12:09

## 2018-09-19 RX ADMIN — ACETAMINOPHEN 1000 MG: 10 INJECTION, SOLUTION INTRAVENOUS at 02:09

## 2018-09-19 RX ADMIN — SODIUM CHLORIDE, POTASSIUM CHLORIDE, SODIUM LACTATE AND CALCIUM CHLORIDE: 600; 310; 30; 20 INJECTION, SOLUTION INTRAVENOUS at 01:09

## 2018-09-19 RX ADMIN — Medication 10 ML: at 07:09

## 2018-09-19 RX ADMIN — IPRATROPIUM BROMIDE AND ALBUTEROL SULFATE 3 ML: .5; 3 SOLUTION RESPIRATORY (INHALATION) at 06:09

## 2018-09-19 RX ADMIN — SODIUM CHLORIDE, POTASSIUM CHLORIDE, SODIUM LACTATE AND CALCIUM CHLORIDE: 600; 310; 30; 20 INJECTION, SOLUTION INTRAVENOUS at 06:09

## 2018-09-19 RX ADMIN — KETOROLAC TROMETHAMINE 15 MG: 30 INJECTION, SOLUTION INTRAMUSCULAR; INTRAVENOUS at 02:09

## 2018-09-19 RX ADMIN — HEPARIN SODIUM 5000 UNITS: 5000 INJECTION, SOLUTION INTRAVENOUS; SUBCUTANEOUS at 05:09

## 2018-09-19 NOTE — PLAN OF CARE
Problem: Patient Care Overview  Goal: Plan of Care Review  Outcome: Ongoing (interventions implemented as appropriate)  Head to toe assessments q4h/prn  V/s q1h/prn  Dvt/vte/scd's- application/removal/skin inspection  Oxygen titration  Neb tx's q4h  ngt rt nare lcws, flush w/ 30ml water q4h/prn  Joyce care/maintenance q12h/prn  Dressing changes to midline abd incision q48h/prn, pressure ulcer prevention/skin care  piv maintenance/ivf hydration  Lab collection/monitoring  Npo except meds  Fall risk/prevention  Safety measures: bed in low locked position, call light/personal belongings w/ in reach, 4 p's addressed w/ each and every interaction w/ pt prior to leaving room, non slip foot wear in place when out of bed  Ambulated bid  Turn q2h/prn  Up to chair bid/prn  Accurate I/o q1h/prn  I/s q4h/prn  Pain/n/v control  Medication education pamphlet given to pt: indications, side effects, adverse reactions of sched and prn meds

## 2018-09-19 NOTE — ASSESSMENT & PLAN NOTE
9/18/2018:  Op Day:  1.  Continue current pain control  2.  Repeat CBC, CMP in the morning  3.  Follow with any medical issues that arise.    Like to thank you Dr. Kinney for allowing me to be involved the care of this patient!    9/19/18:  Continue same Treatment                 Patient stable PO Day #1                 Repeat labs in AM

## 2018-09-19 NOTE — PROGRESS NOTES
[facility] - ICU  General Surgery  Daily Note    Patient Name: Paolo Brower  MRN: [id number]  Admission Date: 9/18/2018  Attending Physician: Del Basilio MD   Consult Physician: Rene Kinney MD  Primary Care Provider: Tyrone Issa MD    Subjective:     Principle Problem: Colovesical fistula    Last 24 hour history:  9/19/18  Patient doing well status post colectomy with bladder repair yesterday.  No issues in the last 24 hr.  Pain well controlled.  No nausea or vomiting.  NG tube with approximately 500 cc bilious like output.  No bowel function yet.  Afebrile vital signs completely stable.  Patient incentive spirometer 2000.  Up and out of bed to chair yesterday evening.  No new complaints today.    Objective:     Vital Signs (Most Recent):  Temp: 98.2 °F (36.8 °C) (09/19/18 0800)  Pulse: 80 (09/19/18 0800)  Resp: 16 (09/19/18 0800)  BP: (!) 140/85 (09/19/18 0800)  SpO2: 96 % (09/19/18 0800) Vital Signs (24h Range):  Temp:  [97.7 °F (36.5 °C)-99 °F (37.2 °C)] 98.2 °F (36.8 °C)  Pulse:  [] 80  Resp:  [6-19] 16  SpO2:  [94 %-100 %] 96 %  BP: (122-150)/() 140/85     Intake/Output last 24 hours:    Intake/Output Summary (Last 24 hours) at 9/19/2018 0833  Last data filed at 9/19/2018 0800  Gross per 24 hour   Intake 8640.83 ml   Output 5315 ml   Net 3325.83 ml       I/O last 3 completed shifts:  In: 8475.8 [P.O.:30; I.V.:7995.8; NG/GT:150; IV Piggyback:300]  Out: 5215 [Urine:4525; Drains:590; Blood:100]  I/O this shift:  In: 165 [I.V.:135; NG/GT:30]  Out: 100 [Urine:100]    Weight: 119.7 kg (264 lb)  Body mass index is 33.9 kg/m².    Gen: Wd Wn male currently in NAD  Heent: Nc/At, MMM  Eyes: Perrl, Eomi  Cv: RRR, no  M/g/r  Lung: Non-labored breathing, clear bilaterally  Abd: Soft, surgical site clean dry intact no signs or symptoms of infection, tenderness expected in the postoperative phase.    Significant Labs:  CBC:   Recent Labs   Lab  09/19/18   0445   WBC  14.43*    RBC  4.88   HGB  13.9*   HCT  40.2   PLT  294   MCV  82   MCH  28.5   MCHC  34.6     BMP:   Recent Labs   Lab  09/19/18   0445   GLU  107   NA  135*   K  4.1   CL  102   CO2  26   BUN  11   CREATININE  0.9   CALCIUM  8.3*   MG  2.1     CMP:   Recent Labs   Lab  09/19/18   0445   GLU  107   CALCIUM  8.3*   NA  135*   K  4.1   CO2  26   CL  102   BUN  11   CREATININE  0.9     LFTs: No results for input(s): ALT, AST, ALKPHOS, BILITOT, PROT, ALBUMIN in the last 168 hours.  Coagulation: No results for input(s): LABPROT, INR, APTT in the last 168 hours.  Specimen (12h ago, onward)    None        No results for input(s): COLORU, CLARITYU, SPECGRAV, PHUR, PROTEINUA, GLUCOSEU, BILIRUBINCON, BLOODU, WBCU, RBCU, BACTERIA, MUCUS, NITRITE, LEUKOCYTESUR, UROBILINOGEN, HYALINECASTS in the last 168 hours.    Cultures:    Microbiology Results (last 7 days)     ** No results found for the last 168 hours. **          Assessment:   Paolo Brower is a 48 y.o. male who presents with Colovesical fistula.    Active Diagnoses:    Diagnosis Date Noted POA    PRINCIPAL PROBLEM:  Colovesical fistula [N32.1] 08/15/2018 Yes      Problems Resolved During this Admission:     VTE Risk Mitigation (From admission, onward)        Ordered     heparin (porcine) injection 5,000 Units  Every 8 hours      09/18/18 1615     IP VTE HIGH RISK PATIENT  Once      09/18/18 1615     Place BRUNO hose  Until discontinued      09/18/18 1615     Place sequential compression device  Until discontinued      09/18/18 1615          Medical Decision Making/Plan:  Patient with satisfactory postoperative recovery thus far.  Leukocytosis with left shift expected in the acute phase postsurgery.  This is improving.  Patient continues on Invanz therapy in light of acute on chronic inflammation associated with patient's colovesicular fistula.  Will plan for least a 5-7 day course of postoperative antibiotics.  Patient with pulmonary toilet ongoing an incentive spirometer at  2000.  DVT prophylaxis both mechanical and pharmacologic on going.  Continue IV fluid hydration, NG tube decompression and bowel rest until bowel function returned with flatus.  Will start Toradol this afternoon to assist with pain control.  Continue IV Tylenol and IV Dilaudid as needed.  Further management will depend upon clinical course    Rene Kinney MD  General Surgery  [facility] - ICU

## 2018-09-19 NOTE — SUBJECTIVE & OBJECTIVE
Interval History: Stable POD #1    Review of Systems   Constitutional: Negative for activity change, appetite change, fatigue and fever.   HENT: Negative for congestion, ear discharge, hearing loss, mouth sores, nosebleeds, rhinorrhea, sinus pressure, sinus pain and tinnitus.    Eyes: Negative.  Negative for pain, redness and itching.   Respiratory: Negative for apnea, cough, choking, chest tightness, shortness of breath, wheezing and stridor.    Cardiovascular: Negative for chest pain, palpitations and leg swelling.   Gastrointestinal: Positive for abdominal pain. Negative for abdominal distention, anal bleeding, blood in stool, constipation and diarrhea.   Endocrine: Negative.    Genitourinary: Negative for difficulty urinating, flank pain, frequency and urgency.   Musculoskeletal: Positive for arthralgias. Negative for back pain, gait problem and myalgias.   Skin: Negative for color change and pallor.   Allergic/Immunologic: Negative.    Neurological: Negative for dizziness, facial asymmetry, weakness, light-headedness and headaches.   Hematological: Negative for adenopathy. Does not bruise/bleed easily.     Objective:     Vital Signs (Most Recent):  Temp: 98.2 °F (36.8 °C) (09/19/18 0800)  Pulse: 80 (09/19/18 0800)  Resp: 16 (09/19/18 0800)  BP: (!) 140/85 (09/19/18 0800)  SpO2: 96 % (09/19/18 0800) Vital Signs (24h Range):  Temp:  [97.7 °F (36.5 °C)-99 °F (37.2 °C)] 98.2 °F (36.8 °C)  Pulse:  [] 80  Resp:  [6-19] 16  SpO2:  [94 %-100 %] 96 %  BP: (122-150)/() 140/85     Weight: 119.7 kg (264 lb)  Body mass index is 33.9 kg/m².    Intake/Output Summary (Last 24 hours) at 9/19/2018 0842  Last data filed at 9/19/2018 0800  Gross per 24 hour   Intake 8640.83 ml   Output 5315 ml   Net 3325.83 ml      Physical Exam   Constitutional: He is oriented to person, place, and time. He appears well-developed and well-nourished.   HENT:   Head: Normocephalic and atraumatic.   Eyes: EOM are normal. Pupils are  equal, round, and reactive to light.   Neck: Normal range of motion. Neck supple. No tracheal deviation present. No thyromegaly present.   Pulmonary/Chest: Effort normal and breath sounds normal.   Abdominal: Soft. Bowel sounds are normal. He exhibits no distension. There is tenderness. There is guarding. There is no rebound.   Musculoskeletal: Normal range of motion.   Lymphadenopathy:     He has no cervical adenopathy.   Neurological: He is alert and oriented to person, place, and time.   Skin: Skin is warm and dry. Capillary refill takes less than 2 seconds.   Psychiatric: He has a normal mood and affect. His behavior is normal. Judgment and thought content normal.       Significant Labs: All pertinent labs within the past 24 hours have been reviewed.    Significant Imaging: I have reviewed and interpreted all pertinent imaging results/findings within the past 24 hours.

## 2018-09-19 NOTE — PLAN OF CARE
Problem: Patient Care Overview  Goal: Plan of Care Review  Outcome: Ongoing (interventions implemented as appropriate)  Aerosol treatment,incentive spirometry & o2 via ncann

## 2018-09-19 NOTE — ANESTHESIA POSTPROCEDURE EVALUATION
"Anesthesia Post Evaluation    Patient: Paolo Brower    Procedure(s) Performed: Procedure(s) (LRB):  COLECTOMY,SIGMOID (N/A)  CYSTECTOMY, PARTIAL (N/A)    [facility] Anesthesia Post Op Evaluation    Visit Vitals  /87   Pulse 78   Temp 36.8 °C (98.2 °F)   Resp 11   Ht 6' 2" (1.88 m)   Wt 119.7 kg (264 lb)   SpO2 100%   BMI 33.90 kg/m²       Pain/Ortiz Score: Pain Assessment Performed: Yes (9/18/2018  9:02 PM)  Presence of Pain: denies (9/17/2018  9:28 AM)  Pain Rating Prior to Med Admin: 8 (9/18/2018  7:44 PM)  Pain Rating Post Med Admin: 2 (9/18/2018  8:14 PM)  Ortiz Score: 9 (9/18/2018  3:26 PM)      A few hours after arrival to ICU Mr. Brower states that he has bilateral arm numbness. He has good muscle strength in both extremities. His arms were in the neutral position throughput the procrdure. And his elbows were padded. I will ask Dr. Mina to evaluate him tomorrow.  "

## 2018-09-19 NOTE — PLAN OF CARE
Problem: Patient Care Overview  Goal: Plan of Care Review  Outcome: Ongoing (interventions implemented as appropriate)  PT VERABLIZES UNDERSTANDING OF PT EDUCATION AND CLINICAL PROCESSES OF ICU PER RETURNED DEMONSTRATION OF INFORMATION.

## 2018-09-19 NOTE — PROGRESS NOTES
[facility] - ICU  Hospital Medicine  Progress Note    Patient Name: Paolo Brower  MRN: [id number]  Patient Class: IP- Inpatient   Admission Date: 9/18/2018  Length of Stay: 1 days  Attending Physician: Del Basilio MD  Primary Care Provider: Tyrone Issa MD        Subjective:     Principal Problem:Colovesical fistula    HPI:  This is a 48-year-old male that is status post partial colectomy due to diverticular disease. Patient had a colovesicular fistula and was corrected today by Dr. Kinney.  I have been asked to admit this patient to my service for medical management and for surgical consult.  This patient surgery day was elective surgery and not emergent.  Patient has no known past medical history other than this current diagnosis and has been otherwise healthy.  Patient is doing well postoperatively with adequate pain management and otherwise no significant complaints.  He has no known drug allergies.  He takes no current medications.    Hospital Course:  Patient id was admitted this morning to the surgical suite and the surgery was performed by Dr. Kinney.  The surgery was without complications and he was transferred from recovery to the ICU for continued care.    Post Op Day 1:  Patient is doing well postop day 1 still having some pain as expected but otherwise is stable.  Some of this discussion with Dr. Kinney shows a plan to continue Joyce continue pain control and otherwise will follow as needed medically.  Patient is currently medically stable and having no other difficulty    Interval History: Stable POD #1    Review of Systems   Constitutional: Negative for activity change, appetite change, fatigue and fever.   HENT: Negative for congestion, ear discharge, hearing loss, mouth sores, nosebleeds, rhinorrhea, sinus pressure, sinus pain and tinnitus.    Eyes: Negative.  Negative for pain, redness and itching.   Respiratory: Negative for apnea, cough, choking, chest  tightness, shortness of breath, wheezing and stridor.    Cardiovascular: Negative for chest pain, palpitations and leg swelling.   Gastrointestinal: Positive for abdominal pain. Negative for abdominal distention, anal bleeding, blood in stool, constipation and diarrhea.   Endocrine: Negative.    Genitourinary: Negative for difficulty urinating, flank pain, frequency and urgency.   Musculoskeletal: Positive for arthralgias. Negative for back pain, gait problem and myalgias.   Skin: Negative for color change and pallor.   Allergic/Immunologic: Negative.    Neurological: Negative for dizziness, facial asymmetry, weakness, light-headedness and headaches.   Hematological: Negative for adenopathy. Does not bruise/bleed easily.     Objective:     Vital Signs (Most Recent):  Temp: 98.2 °F (36.8 °C) (09/19/18 0800)  Pulse: 80 (09/19/18 0800)  Resp: 16 (09/19/18 0800)  BP: (!) 140/85 (09/19/18 0800)  SpO2: 96 % (09/19/18 0800) Vital Signs (24h Range):  Temp:  [97.7 °F (36.5 °C)-99 °F (37.2 °C)] 98.2 °F (36.8 °C)  Pulse:  [] 80  Resp:  [6-19] 16  SpO2:  [94 %-100 %] 96 %  BP: (122-150)/() 140/85     Weight: 119.7 kg (264 lb)  Body mass index is 33.9 kg/m².    Intake/Output Summary (Last 24 hours) at 9/19/2018 0842  Last data filed at 9/19/2018 0800  Gross per 24 hour   Intake 8640.83 ml   Output 5315 ml   Net 3325.83 ml      Physical Exam   Constitutional: He is oriented to person, place, and time. He appears well-developed and well-nourished.   HENT:   Head: Normocephalic and atraumatic.   Eyes: EOM are normal. Pupils are equal, round, and reactive to light.   Neck: Normal range of motion. Neck supple. No tracheal deviation present. No thyromegaly present.   Pulmonary/Chest: Effort normal and breath sounds normal.   Abdominal: Soft. Bowel sounds are normal. He exhibits no distension. There is tenderness. There is guarding. There is no rebound.   Musculoskeletal: Normal range of motion.   Lymphadenopathy:     He  has no cervical adenopathy.   Neurological: He is alert and oriented to person, place, and time.   Skin: Skin is warm and dry. Capillary refill takes less than 2 seconds.   Psychiatric: He has a normal mood and affect. His behavior is normal. Judgment and thought content normal.       Significant Labs: All pertinent labs within the past 24 hours have been reviewed.    Significant Imaging: I have reviewed and interpreted all pertinent imaging results/findings within the past 24 hours.    Assessment/Plan:      * Colovesical fistula    9/18/2018:  Op Day:  1.  Continue current pain control  2.  Repeat CBC, CMP in the morning  3.  Follow with any medical issues that arise.    Like to thank you Dr. Kinney for allowing me to be involved the care of this patient!    9/19/18:  Continue same Treatment                 Patient stable PO Day #1                 Repeat labs in AM          VTE Risk Mitigation (From admission, onward)        Ordered     heparin (porcine) injection 5,000 Units  Every 8 hours      09/18/18 1615     IP VTE HIGH RISK PATIENT  Once      09/18/18 1615     Place BRUNO hose  Until discontinued      09/18/18 1615     Place sequential compression device  Until discontinued      09/18/18 1615              Del Basilio MD  Department of Hospital Medicine   [facility] - ICU

## 2018-09-20 LAB
ALBUMIN SERPL BCP-MCNC: 3.5 G/DL
ALP SERPL-CCNC: 78 U/L
ALT SERPL W/O P-5'-P-CCNC: 52 U/L
ANION GAP SERPL CALC-SCNC: 7 MMOL/L
AST SERPL-CCNC: 19 U/L
BASOPHILS # BLD AUTO: 0.05 K/UL
BASOPHILS NFR BLD: 0.4 %
BILIRUB SERPL-MCNC: 0.6 MG/DL
BUN SERPL-MCNC: 11 MG/DL
CALCIUM SERPL-MCNC: 8.7 MG/DL
CHLORIDE SERPL-SCNC: 99 MMOL/L
CO2 SERPL-SCNC: 27 MMOL/L
CREAT SERPL-MCNC: 0.9 MG/DL
DIFFERENTIAL METHOD: ABNORMAL
EOSINOPHIL # BLD AUTO: 0.3 K/UL
EOSINOPHIL NFR BLD: 2.1 %
ERYTHROCYTE [DISTWIDTH] IN BLOOD BY AUTOMATED COUNT: 12.8 %
EST. GFR  (AFRICAN AMERICAN): >60 ML/MIN/1.73 M^2
EST. GFR  (NON AFRICAN AMERICAN): >60 ML/MIN/1.73 M^2
GLUCOSE SERPL-MCNC: 87 MG/DL
HCT VFR BLD AUTO: 38.9 %
HGB BLD-MCNC: 13.6 G/DL
IMM GRANULOCYTES # BLD AUTO: 0.04 K/UL
IMM GRANULOCYTES NFR BLD AUTO: 0.3 %
LYMPHOCYTES # BLD AUTO: 2.6 K/UL
LYMPHOCYTES NFR BLD: 20.9 %
MCH RBC QN AUTO: 28.8 PG
MCHC RBC AUTO-ENTMCNC: 35 G/DL
MCV RBC AUTO: 82 FL
MONOCYTES # BLD AUTO: 1.2 K/UL
MONOCYTES NFR BLD: 9.2 %
NEUTROPHILS # BLD AUTO: 8.4 K/UL
NEUTROPHILS NFR BLD: 67.1 %
NRBC BLD-RTO: 0 /100 WBC
PLATELET # BLD AUTO: 285 K/UL
PMV BLD AUTO: 9.1 FL
POTASSIUM SERPL-SCNC: 4.2 MMOL/L
PROT SERPL-MCNC: 6.2 G/DL
RBC # BLD AUTO: 4.73 M/UL
SODIUM SERPL-SCNC: 133 MMOL/L
WBC # BLD AUTO: 12.56 K/UL

## 2018-09-20 PROCEDURE — 63600175 PHARM REV CODE 636 W HCPCS: Performed by: SURGERY

## 2018-09-20 PROCEDURE — 20000000 HC ICU ROOM

## 2018-09-20 PROCEDURE — 25000242 PHARM REV CODE 250 ALT 637 W/ HCPCS: Performed by: SURGERY

## 2018-09-20 PROCEDURE — 85025 COMPLETE CBC W/AUTO DIFF WBC: CPT

## 2018-09-20 PROCEDURE — 94799 UNLISTED PULMONARY SVC/PX: CPT

## 2018-09-20 PROCEDURE — 94640 AIRWAY INHALATION TREATMENT: CPT

## 2018-09-20 PROCEDURE — 25000003 PHARM REV CODE 250: Performed by: SURGERY

## 2018-09-20 PROCEDURE — 27000221 HC OXYGEN, UP TO 24 HOURS

## 2018-09-20 PROCEDURE — C9113 INJ PANTOPRAZOLE SODIUM, VIA: HCPCS | Performed by: SURGERY

## 2018-09-20 PROCEDURE — A4216 STERILE WATER/SALINE, 10 ML: HCPCS | Performed by: SURGERY

## 2018-09-20 PROCEDURE — 80053 COMPREHEN METABOLIC PANEL: CPT

## 2018-09-20 PROCEDURE — 99900035 HC TECH TIME PER 15 MIN (STAT)

## 2018-09-20 RX ORDER — ACETAMINOPHEN 10 MG/ML
1000 INJECTION, SOLUTION INTRAVENOUS EVERY 8 HOURS
Status: COMPLETED | OUTPATIENT
Start: 2018-09-20 | End: 2018-09-21

## 2018-09-20 RX ADMIN — Medication 10 ML: at 10:09

## 2018-09-20 RX ADMIN — HEPARIN SODIUM 5000 UNITS: 5000 INJECTION, SOLUTION INTRAVENOUS; SUBCUTANEOUS at 01:09

## 2018-09-20 RX ADMIN — IPRATROPIUM BROMIDE AND ALBUTEROL SULFATE 3 ML: .5; 3 SOLUTION RESPIRATORY (INHALATION) at 12:09

## 2018-09-20 RX ADMIN — PANTOPRAZOLE SODIUM 40 MG: 40 INJECTION, POWDER, LYOPHILIZED, FOR SOLUTION INTRAVENOUS at 08:09

## 2018-09-20 RX ADMIN — Medication 20 ML: at 05:09

## 2018-09-20 RX ADMIN — KETOROLAC TROMETHAMINE 15 MG: 30 INJECTION, SOLUTION INTRAMUSCULAR; INTRAVENOUS at 11:09

## 2018-09-20 RX ADMIN — ACETAMINOPHEN 1000 MG: 10 INJECTION, SOLUTION INTRAVENOUS at 09:09

## 2018-09-20 RX ADMIN — ACETAMINOPHEN 1000 MG: 10 INJECTION, SOLUTION INTRAVENOUS at 05:09

## 2018-09-20 RX ADMIN — ACETAMINOPHEN 1000 MG: 10 INJECTION, SOLUTION INTRAVENOUS at 01:09

## 2018-09-20 RX ADMIN — HYDROMORPHONE HYDROCHLORIDE 1 MG: 2 INJECTION INTRAMUSCULAR; INTRAVENOUS; SUBCUTANEOUS at 05:09

## 2018-09-20 RX ADMIN — HYDROMORPHONE HYDROCHLORIDE 1 MG: 2 INJECTION INTRAMUSCULAR; INTRAVENOUS; SUBCUTANEOUS at 02:09

## 2018-09-20 RX ADMIN — IPRATROPIUM BROMIDE AND ALBUTEROL SULFATE 3 ML: .5; 3 SOLUTION RESPIRATORY (INHALATION) at 07:09

## 2018-09-20 RX ADMIN — IPRATROPIUM BROMIDE AND ALBUTEROL SULFATE 3 ML: .5; 3 SOLUTION RESPIRATORY (INHALATION) at 01:09

## 2018-09-20 RX ADMIN — Medication 10 ML: at 11:09

## 2018-09-20 RX ADMIN — HYDROMORPHONE HYDROCHLORIDE 1 MG: 2 INJECTION INTRAMUSCULAR; INTRAVENOUS; SUBCUTANEOUS at 09:09

## 2018-09-20 RX ADMIN — Medication 10 ML: at 04:09

## 2018-09-20 RX ADMIN — SODIUM CHLORIDE, POTASSIUM CHLORIDE, SODIUM LACTATE AND CALCIUM CHLORIDE: 600; 310; 30; 20 INJECTION, SOLUTION INTRAVENOUS at 08:09

## 2018-09-20 RX ADMIN — KETOROLAC TROMETHAMINE 15 MG: 30 INJECTION, SOLUTION INTRAMUSCULAR; INTRAVENOUS at 05:09

## 2018-09-20 RX ADMIN — HYDROMORPHONE HYDROCHLORIDE 1 MG: 2 INJECTION INTRAMUSCULAR; INTRAVENOUS; SUBCUTANEOUS at 10:09

## 2018-09-20 RX ADMIN — Medication 10 ML: at 08:09

## 2018-09-20 RX ADMIN — ERTAPENEM SODIUM 1 G: 1 INJECTION, POWDER, LYOPHILIZED, FOR SOLUTION INTRAMUSCULAR; INTRAVENOUS at 04:09

## 2018-09-20 RX ADMIN — HEPARIN SODIUM 5000 UNITS: 5000 INJECTION, SOLUTION INTRAVENOUS; SUBCUTANEOUS at 05:09

## 2018-09-20 RX ADMIN — SODIUM CHLORIDE, POTASSIUM CHLORIDE, SODIUM LACTATE AND CALCIUM CHLORIDE 1000 ML: 600; 310; 30; 20 INJECTION, SOLUTION INTRAVENOUS at 09:09

## 2018-09-20 RX ADMIN — HEPARIN SODIUM 5000 UNITS: 5000 INJECTION, SOLUTION INTRAVENOUS; SUBCUTANEOUS at 09:09

## 2018-09-20 RX ADMIN — SODIUM CHLORIDE, POTASSIUM CHLORIDE, SODIUM LACTATE AND CALCIUM CHLORIDE: 600; 310; 30; 20 INJECTION, SOLUTION INTRAVENOUS at 01:09

## 2018-09-20 NOTE — PLAN OF CARE
09/20/18 1328   Discharge Reassessment   Assessment Type Discharge Planning Reassessment   Discharge plan remains the same: Yes   Provided patient/caregiver education on the expected discharge date and the discharge plan Yes   Discharge Plan A Home with family   Change in patient condition or support system No   Patient choice form signed by patient/caregiver N/A   Explained to the the patient/caregiver why the discharge planned changed: Yes   Involved the patient/caregiver in establishing a new discharge plan: Yes   Patient states he may get transferred to floor today. Still denies wanting home health when discharged. Will continue to follow for needs.

## 2018-09-20 NOTE — PROGRESS NOTES
[facility]  Hospital Medicine  Progress Note    Patient Name: Paolo Brower  MRN: [id number]  Patient Class: IP- Inpatient   Admission Date: 9/18/2018  Length of Stay: 2 days  Attending Physician: Del Basilio MD  Primary Care Provider: Tyrone Issa MD        Subjective:     Principal Problem:Colovesical fistula    HPI:  This is a 48-year-old male that is status post partial colectomy due to diverticular disease. Patient had a colovesicular fistula and was corrected today by Dr. Kinney.  I have been asked to admit this patient to my service for medical management and for surgical consult.  This patient surgery day was elective surgery and not emergent.  Patient has no known past medical history other than this current diagnosis and has been otherwise healthy.  Patient is doing well postoperatively with adequate pain management and otherwise no significant complaints.  He has no known drug allergies.  He takes no current medications.    Hospital Course:  Patient id was admitted this morning to the surgical suite and the surgery was performed by Dr. Kinney.  The surgery was without complications and he was transferred from recovery to the ICU for continued care.    Post Op Day 1:  Patient is doing well postop day 1 still having some pain as expected but otherwise is stable.  Some of this discussion with Dr. Kinney shows a plan to continue Joyce continue pain control and otherwise will follow as needed medically.  Patient is currently medically stable and having no other difficulty\    Postop day 2:  Patient is much improved this morning having less pain. No passing flatus yet.  Otherwise lab results appeared generally within normal ranges.  Patient has been active and up to the bedside in chair without difficulty    Post op day 3:  Patient has no complaints although not passing gas at this time.  Pain is well controlled and otherwise no new issues have come up.  Postop  course is going well as expected    Interval History:  Patient is doing well and progressing well postop day 2    Review of Systems   Constitutional: Negative for activity change, appetite change, fatigue and fever.   HENT: Negative for congestion, ear discharge, mouth sores, nosebleeds, rhinorrhea, sinus pressure, sinus pain and tinnitus.    Eyes: Negative.  Negative for pain, redness and itching.   Respiratory: Negative for apnea, cough, choking, chest tightness, shortness of breath, wheezing and stridor.    Cardiovascular: Negative for chest pain, palpitations and leg swelling.   Gastrointestinal: Positive for abdominal pain and constipation. Negative for anal bleeding, blood in stool and diarrhea.   Endocrine: Negative.    Genitourinary: Negative for difficulty urinating, flank pain, frequency and urgency.   Musculoskeletal: Negative for arthralgias, back pain, gait problem and myalgias.   Skin: Negative for color change and pallor.   Allergic/Immunologic: Negative.    Neurological: Negative for dizziness, facial asymmetry, weakness, light-headedness and headaches.   Hematological: Negative for adenopathy. Does not bruise/bleed easily.     Objective:     Vital Signs (Most Recent):  Temp: 99 °F (37.2 °C) (09/20/18 1200)  Pulse: 81 (09/20/18 1200)  Resp: 17 (09/20/18 1200)  BP: (!) 136/91 (09/20/18 1200)  SpO2: 96 % (09/20/18 1200) Vital Signs (24h Range):  Temp:  [97.8 °F (36.6 °C)-99.7 °F (37.6 °C)] 99 °F (37.2 °C)  Pulse:  [] 81  Resp:  [10-23] 17  SpO2:  [91 %-97 %] 96 %  BP: (119-147)/(76-96) 136/91     Weight: 119.7 kg (264 lb)  Body mass index is 33.9 kg/m².    Intake/Output Summary (Last 24 hours) at 9/20/2018 1240  Last data filed at 9/20/2018 1200  Gross per 24 hour   Intake 3431.66 ml   Output 4360 ml   Net -928.34 ml      Physical Exam   Constitutional: He is oriented to person, place, and time. He appears well-developed and well-nourished.   HENT:   Head: Normocephalic and atraumatic.   Eyes:  EOM are normal. Pupils are equal, round, and reactive to light.   Neck: Normal range of motion. Neck supple. No tracheal deviation present. No thyromegaly present.   Pulmonary/Chest: Effort normal and breath sounds normal.   Abdominal: Soft. Bowel sounds are normal. There is tenderness. There is no rebound and no guarding.   Musculoskeletal: Normal range of motion.   Lymphadenopathy:     He has no cervical adenopathy.   Neurological: He is alert and oriented to person, place, and time.   Skin: Skin is warm and dry. Capillary refill takes less than 2 seconds.   Psychiatric: He has a normal mood and affect. His behavior is normal. Judgment and thought content normal.       Significant Labs: All pertinent labs within the past 24 hours have been reviewed.    Significant Imaging: I have reviewed and interpreted all pertinent imaging results/findings within the past 24 hours.    Assessment/Plan:      * Colovesical fistula    9/18/2018:  Op Day:  1.  Continue current pain control  2.  Repeat CBC, CMP in the morning  3.  Follow with any medical issues that arise.    Like to thank you Dr. Kinney for allowing me to be involved the care of this patient!    9/19/18:  Continue same Treatment                 Patient stable PO Day #1                 Repeat labs in AM    09/20/2018:  Continue new same treatment course                       Repeat labs in the a.m.                          Patient otherwise stable postop day 2           VTE Risk Mitigation (From admission, onward)        Ordered     heparin (porcine) injection 5,000 Units  Every 8 hours      09/18/18 1615     IP VTE HIGH RISK PATIENT  Once      09/18/18 1615     Place BRUNO hose  Until discontinued      09/18/18 1615     Place sequential compression device  Until discontinued      09/18/18 1615              Del Basilio MD  Department of Hospital Medicine   [facility] - ICU

## 2018-09-20 NOTE — PROGRESS NOTES
[facility] - ICU  General Surgery  Daily Note    Patient Name: Paolo Brower  MRN: [id number]  Admission Date: 9/18/2018  Attending Physician: Del Basilio MD   Consult Physician: Rene Kinney MD  Primary Care Provider: Tyrone Issa MD    Subjective:     Principle Problem: Colovesical fistula    Last 24 hour history:  9/19/18  Patient doing well status post colectomy with bladder repair yesterday.  No issues in the last 24 hr.  Pain well controlled.  No nausea or vomiting.  NG tube with approximately 500 cc bilious like output.  No bowel function yet.  Afebrile vital signs completely stable.  Patient incentive spirometer 2000.  Up and out of bed to chair yesterday evening.  No new complaints today.    9/20/18  No acute events last 24 hr.  Patient's pain well controlled.  IS 2750.  No nausea vomiting.  NG tube with 700 cc bilious output.  No bowel function yet.  Afebrile.  Vital signs stable.  Laboratory values improving.  White count 46809 improving.  No new complaints    Objective:     Vital Signs (Most Recent):  Temp: 97.8 °F (36.6 °C) (09/20/18 0701)  Pulse: 102 (09/20/18 0725)  Resp: (!) 23 (09/20/18 0725)  BP: 125/77 (09/20/18 0701)  SpO2: 95 % (09/20/18 0725) Vital Signs (24h Range):  Temp:  [97.8 °F (36.6 °C)-99.7 °F (37.6 °C)] 97.8 °F (36.6 °C)  Pulse:  [] 102  Resp:  [10-23] 23  SpO2:  [91 %-98 %] 95 %  BP: (119-147)/(77-96) 125/77     Intake/Output last 24 hours:    Intake/Output Summary (Last 24 hours) at 9/20/2018 0817  Last data filed at 9/20/2018 0701  Gross per 24 hour   Intake 3504.58 ml   Output 4360 ml   Net -855.42 ml       I/O last 3 completed shifts:  In: 5822.9 [P.O.:130; I.V.:4752.9; NG/GT:240; IV Piggyback:700]  Out: 7760 [Urine:6560; Drains:1200]  I/O this shift:  In: 92.5 [I.V.:62.5; NG/GT:30]  Out: 450 [Urine:150; Drains:300]    Weight: 119.7 kg (264 lb)  Body mass index is 33.9 kg/m².    Gen: Wd Wn male currently in NAD  Heent: Nc/At, MMM  Eyes:  Perrl, Eomi  Cv: RRR, no  M/g/r  Lung: Non-labored breathing, clear bilaterally  Abd: Soft, surgical site clean dry intact no signs or symptoms of infection, tenderness expected in the postoperative phase.    Significant Labs:  CBC:   Recent Labs   Lab  09/20/18   0525   WBC  12.56   RBC  4.73   HGB  13.6*   HCT  38.9*   PLT  285   MCV  82   MCH  28.8   MCHC  35.0     BMP:   Recent Labs   Lab  09/19/18   0445  09/20/18   0420   GLU  107  87   NA  135*  133*   K  4.1  4.2   CL  102  99   CO2  26  27   BUN  11  11   CREATININE  0.9  0.9   CALCIUM  8.3*  8.7   MG  2.1   --      CMP:   Recent Labs   Lab  09/20/18   0420   GLU  87   CALCIUM  8.7   ALBUMIN  3.5   PROT  6.2   NA  133*   K  4.2   CO2  27   CL  99   BUN  11   CREATININE  0.9   ALKPHOS  78   ALT  52*   AST  19   BILITOT  0.6     LFTs:   Recent Labs   Lab  09/20/18   0420   ALT  52*   AST  19   ALKPHOS  78   BILITOT  0.6   PROT  6.2   ALBUMIN  3.5     Coagulation: No results for input(s): LABPROT, INR, APTT in the last 168 hours.  Specimen (12h ago, onward)    None        No results for input(s): COLORU, CLARITYU, SPECGRAV, PHUR, PROTEINUA, GLUCOSEU, BILIRUBINCON, BLOODU, WBCU, RBCU, BACTERIA, MUCUS, NITRITE, LEUKOCYTESUR, UROBILINOGEN, HYALINECASTS in the last 168 hours.    Cultures:    Microbiology Results (last 7 days)     ** No results found for the last 168 hours. **          Assessment:   Paolo Brower is a 48 y.o. male who presents with Colovesical fistula.    Active Diagnoses:    Diagnosis Date Noted POA    PRINCIPAL PROBLEM:  Colovesical fistula [N32.1] 08/15/2018 Yes      Problems Resolved During this Admission:     VTE Risk Mitigation (From admission, onward)        Ordered     heparin (porcine) injection 5,000 Units  Every 8 hours      09/18/18 1615     IP VTE HIGH RISK PATIENT  Once      09/18/18 1615     Place BRUNO hose  Until discontinued      09/18/18 1615     Place sequential compression device  Until discontinued      09/18/18 1615           Medical Decision Making/Plan:  Patient with satisfactory postoperative recovery thus far.  Leukocytosis with left shift expected in the acute phase postsurgery.  This is improving.  Patient continues on Invanz therapy in light of acute on chronic inflammation associated with patient's colovesicular fistula.  Will plan for least a 5-7 day course of postoperative antibiotics.  Patient with pulmonary toilet ongoing an incentive spirometer at 2750.  DVT prophylaxis both mechanical and pharmacologic on going.  Continue IV fluid hydration, NG tube decompression and bowel rest until bowel function returned with flatus. Further management will depend upon clinical course. See orders.    Rene Kinney MD  General Surgery  [facility] - ICU

## 2018-09-20 NOTE — SUBJECTIVE & OBJECTIVE
Interval History:  Patient is doing well and progressing well postop day 2    Review of Systems   Constitutional: Negative for activity change, appetite change, fatigue and fever.   HENT: Negative for congestion, ear discharge, mouth sores, nosebleeds, rhinorrhea, sinus pressure, sinus pain and tinnitus.    Eyes: Negative.  Negative for pain, redness and itching.   Respiratory: Negative for apnea, cough, choking, chest tightness, shortness of breath, wheezing and stridor.    Cardiovascular: Negative for chest pain, palpitations and leg swelling.   Gastrointestinal: Positive for abdominal pain and constipation. Negative for anal bleeding, blood in stool and diarrhea.   Endocrine: Negative.    Genitourinary: Negative for difficulty urinating, flank pain, frequency and urgency.   Musculoskeletal: Negative for arthralgias, back pain, gait problem and myalgias.   Skin: Negative for color change and pallor.   Allergic/Immunologic: Negative.    Neurological: Negative for dizziness, facial asymmetry, weakness, light-headedness and headaches.   Hematological: Negative for adenopathy. Does not bruise/bleed easily.     Objective:     Vital Signs (Most Recent):  Temp: 99 °F (37.2 °C) (09/20/18 1200)  Pulse: 81 (09/20/18 1200)  Resp: 17 (09/20/18 1200)  BP: (!) 136/91 (09/20/18 1200)  SpO2: 96 % (09/20/18 1200) Vital Signs (24h Range):  Temp:  [97.8 °F (36.6 °C)-99.7 °F (37.6 °C)] 99 °F (37.2 °C)  Pulse:  [] 81  Resp:  [10-23] 17  SpO2:  [91 %-97 %] 96 %  BP: (119-147)/(76-96) 136/91     Weight: 119.7 kg (264 lb)  Body mass index is 33.9 kg/m².    Intake/Output Summary (Last 24 hours) at 9/20/2018 1240  Last data filed at 9/20/2018 1200  Gross per 24 hour   Intake 3431.66 ml   Output 4360 ml   Net -928.34 ml      Physical Exam   Constitutional: He is oriented to person, place, and time. He appears well-developed and well-nourished.   HENT:   Head: Normocephalic and atraumatic.   Eyes: EOM are normal. Pupils are equal,  round, and reactive to light.   Neck: Normal range of motion. Neck supple. No tracheal deviation present. No thyromegaly present.   Pulmonary/Chest: Effort normal and breath sounds normal.   Abdominal: Soft. Bowel sounds are normal. There is tenderness. There is no rebound and no guarding.   Musculoskeletal: Normal range of motion.   Lymphadenopathy:     He has no cervical adenopathy.   Neurological: He is alert and oriented to person, place, and time.   Skin: Skin is warm and dry. Capillary refill takes less than 2 seconds.   Psychiatric: He has a normal mood and affect. His behavior is normal. Judgment and thought content normal.       Significant Labs: All pertinent labs within the past 24 hours have been reviewed.    Significant Imaging: I have reviewed and interpreted all pertinent imaging results/findings within the past 24 hours.

## 2018-09-20 NOTE — ASSESSMENT & PLAN NOTE
9/18/2018:  Op Day:  1.  Continue current pain control  2.  Repeat CBC, CMP in the morning  3.  Follow with any medical issues that arise.    Like to thank you Dr. Kinney for allowing me to be involved the care of this patient!    9/19/18:  Continue same Treatment                 Patient stable PO Day #1                 Repeat labs in AM    09/20/2018:  Continue new same treatment course                       Repeat labs in the a.m.                          Patient otherwise stable postop day 2

## 2018-09-21 LAB
ALBUMIN SERPL BCP-MCNC: 3.4 G/DL
ALP SERPL-CCNC: 73 U/L
ALT SERPL W/O P-5'-P-CCNC: 38 U/L
ANION GAP SERPL CALC-SCNC: 9 MMOL/L
AST SERPL-CCNC: 17 U/L
BASOPHILS # BLD AUTO: 0.06 K/UL
BASOPHILS NFR BLD: 0.5 %
BILIRUB SERPL-MCNC: 0.6 MG/DL
BUN SERPL-MCNC: 14 MG/DL
CALCIUM SERPL-MCNC: 8.6 MG/DL
CHLORIDE SERPL-SCNC: 99 MMOL/L
CO2 SERPL-SCNC: 26 MMOL/L
CREAT SERPL-MCNC: 0.9 MG/DL
DIFFERENTIAL METHOD: ABNORMAL
EOSINOPHIL # BLD AUTO: 0.5 K/UL
EOSINOPHIL NFR BLD: 4.8 %
ERYTHROCYTE [DISTWIDTH] IN BLOOD BY AUTOMATED COUNT: 12.9 %
EST. GFR  (AFRICAN AMERICAN): >60 ML/MIN/1.73 M^2
EST. GFR  (NON AFRICAN AMERICAN): >60 ML/MIN/1.73 M^2
GLUCOSE SERPL-MCNC: 82 MG/DL
HCT VFR BLD AUTO: 37.7 %
HGB BLD-MCNC: 13.2 G/DL
IMM GRANULOCYTES # BLD AUTO: 0.06 K/UL
IMM GRANULOCYTES NFR BLD AUTO: 0.5 %
LYMPHOCYTES # BLD AUTO: 2 K/UL
LYMPHOCYTES NFR BLD: 17.9 %
MCH RBC QN AUTO: 28.8 PG
MCHC RBC AUTO-ENTMCNC: 35 G/DL
MCV RBC AUTO: 82 FL
MONOCYTES # BLD AUTO: 0.9 K/UL
MONOCYTES NFR BLD: 8.6 %
NEUTROPHILS # BLD AUTO: 7.4 K/UL
NEUTROPHILS NFR BLD: 67.7 %
NRBC BLD-RTO: 0 /100 WBC
PLATELET # BLD AUTO: 316 K/UL
PMV BLD AUTO: 8.8 FL
POTASSIUM SERPL-SCNC: 3.7 MMOL/L
PROT SERPL-MCNC: 6.3 G/DL
RBC # BLD AUTO: 4.59 M/UL
SODIUM SERPL-SCNC: 134 MMOL/L
WBC # BLD AUTO: 10.96 K/UL

## 2018-09-21 PROCEDURE — 99900035 HC TECH TIME PER 15 MIN (STAT)

## 2018-09-21 PROCEDURE — 94640 AIRWAY INHALATION TREATMENT: CPT

## 2018-09-21 PROCEDURE — 94799 UNLISTED PULMONARY SVC/PX: CPT

## 2018-09-21 PROCEDURE — 85025 COMPLETE CBC W/AUTO DIFF WBC: CPT

## 2018-09-21 PROCEDURE — 63600175 PHARM REV CODE 636 W HCPCS: Performed by: SURGERY

## 2018-09-21 PROCEDURE — 25000242 PHARM REV CODE 250 ALT 637 W/ HCPCS: Performed by: SURGERY

## 2018-09-21 PROCEDURE — 11000001 HC ACUTE MED/SURG PRIVATE ROOM

## 2018-09-21 PROCEDURE — 80053 COMPREHEN METABOLIC PANEL: CPT

## 2018-09-21 PROCEDURE — 94760 N-INVAS EAR/PLS OXIMETRY 1: CPT

## 2018-09-21 PROCEDURE — C9113 INJ PANTOPRAZOLE SODIUM, VIA: HCPCS | Performed by: SURGERY

## 2018-09-21 RX ORDER — ACETAMINOPHEN 10 MG/ML
1000 INJECTION, SOLUTION INTRAVENOUS EVERY 8 HOURS
Status: COMPLETED | OUTPATIENT
Start: 2018-09-21 | End: 2018-09-22

## 2018-09-21 RX ADMIN — KETOROLAC TROMETHAMINE 15 MG: 30 INJECTION, SOLUTION INTRAMUSCULAR; INTRAVENOUS at 05:09

## 2018-09-21 RX ADMIN — HYDROMORPHONE HYDROCHLORIDE 1 MG: 2 INJECTION INTRAMUSCULAR; INTRAVENOUS; SUBCUTANEOUS at 09:09

## 2018-09-21 RX ADMIN — IPRATROPIUM BROMIDE AND ALBUTEROL SULFATE 3 ML: .5; 3 SOLUTION RESPIRATORY (INHALATION) at 07:09

## 2018-09-21 RX ADMIN — HEPARIN SODIUM 5000 UNITS: 5000 INJECTION, SOLUTION INTRAVENOUS; SUBCUTANEOUS at 02:09

## 2018-09-21 RX ADMIN — PANTOPRAZOLE SODIUM 40 MG: 40 INJECTION, POWDER, LYOPHILIZED, FOR SOLUTION INTRAVENOUS at 07:09

## 2018-09-21 RX ADMIN — HEPARIN SODIUM 5000 UNITS: 5000 INJECTION, SOLUTION INTRAVENOUS; SUBCUTANEOUS at 05:09

## 2018-09-21 RX ADMIN — IPRATROPIUM BROMIDE AND ALBUTEROL SULFATE 3 ML: .5; 3 SOLUTION RESPIRATORY (INHALATION) at 01:09

## 2018-09-21 RX ADMIN — ACETAMINOPHEN 1000 MG: 10 INJECTION, SOLUTION INTRAVENOUS at 05:09

## 2018-09-21 RX ADMIN — KETOROLAC TROMETHAMINE 15 MG: 30 INJECTION, SOLUTION INTRAMUSCULAR; INTRAVENOUS at 12:09

## 2018-09-21 RX ADMIN — HEPARIN SODIUM 5000 UNITS: 5000 INJECTION, SOLUTION INTRAVENOUS; SUBCUTANEOUS at 09:09

## 2018-09-21 RX ADMIN — HYDROMORPHONE HYDROCHLORIDE 1 MG: 2 INJECTION INTRAMUSCULAR; INTRAVENOUS; SUBCUTANEOUS at 05:09

## 2018-09-21 RX ADMIN — ACETAMINOPHEN 1000 MG: 10 INJECTION, SOLUTION INTRAVENOUS at 02:09

## 2018-09-21 RX ADMIN — ERTAPENEM SODIUM 1 G: 1 INJECTION, POWDER, LYOPHILIZED, FOR SOLUTION INTRAMUSCULAR; INTRAVENOUS at 04:09

## 2018-09-21 RX ADMIN — ONDANSETRON HYDROCHLORIDE 4 MG: 2 INJECTION, SOLUTION INTRAMUSCULAR; INTRAVENOUS at 09:09

## 2018-09-21 RX ADMIN — ACETAMINOPHEN 1000 MG: 10 INJECTION, SOLUTION INTRAVENOUS at 09:09

## 2018-09-21 NOTE — SUBJECTIVE & OBJECTIVE
Interval History:  Patient passing gas now and is otherwise stable.    Review of Systems   Constitutional: Negative for activity change, appetite change, fatigue and fever.   HENT: Negative for congestion, ear discharge, mouth sores, nosebleeds, rhinorrhea, sinus pressure, sinus pain and tinnitus.    Eyes: Negative.  Negative for pain, redness and itching.   Respiratory: Negative for apnea, cough, choking, chest tightness, shortness of breath, wheezing and stridor.    Cardiovascular: Negative for chest pain, palpitations and leg swelling.   Gastrointestinal: Positive for abdominal pain. Negative for abdominal distention, anal bleeding, blood in stool, constipation and diarrhea.   Endocrine: Negative.    Genitourinary: Negative for difficulty urinating, flank pain, frequency and urgency.   Musculoskeletal: Negative for arthralgias, back pain, gait problem and myalgias.   Skin: Negative for color change and pallor.   Allergic/Immunologic: Negative.    Neurological: Negative for dizziness, facial asymmetry, weakness, light-headedness and headaches.   Hematological: Negative for adenopathy. Does not bruise/bleed easily.     Objective:     Vital Signs (Most Recent):  Temp: 98.2 °F (36.8 °C) (09/21/18 1200)  Pulse: 73 (09/21/18 1200)  Resp: 12 (09/21/18 1200)  BP: (!) 134/95 (09/21/18 1200)  SpO2: 96 % (09/21/18 1200) Vital Signs (24h Range):  Temp:  [97.7 °F (36.5 °C)-99.5 °F (37.5 °C)] 98.2 °F (36.8 °C)  Pulse:  [62-97] 73  Resp:  [0-27] 12  SpO2:  [91 %-98 %] 96 %  BP: (113-143)/() 134/95     Weight: 119.7 kg (264 lb)  Body mass index is 33.9 kg/m².    Intake/Output Summary (Last 24 hours) at 9/21/2018 1241  Last data filed at 9/21/2018 1200  Gross per 24 hour   Intake 2267.5 ml   Output 3780 ml   Net -1512.5 ml      Physical Exam   Constitutional: He is oriented to person, place, and time. He appears well-developed and well-nourished.   HENT:   Head: Normocephalic and atraumatic.   Eyes: EOM are normal. Pupils  are equal, round, and reactive to light.   Neck: Normal range of motion. Neck supple. No tracheal deviation present. No thyromegaly present.   Pulmonary/Chest: Effort normal and breath sounds normal.   Abdominal: Soft. Bowel sounds are normal. There is tenderness. There is no rebound and no guarding.   Musculoskeletal: Normal range of motion.   Lymphadenopathy:     He has no cervical adenopathy.   Neurological: He is alert and oriented to person, place, and time.   Skin: Skin is warm and dry. Capillary refill takes less than 2 seconds.   Psychiatric: He has a normal mood and affect. His behavior is normal. Judgment and thought content normal.       Significant Labs: All pertinent labs within the past 24 hours have been reviewed.    Significant Imaging: I have reviewed and interpreted all pertinent imaging results/findings within the past 24 hours.

## 2018-09-21 NOTE — PLAN OF CARE
Problem: Infection, Risk/Actual (Adult)  Intervention: Prevent Infection/Maximize Resistance   09/21/18 1308   Hygiene Care   Bathing/Skin Care linen changed;bath, complete;shampoo

## 2018-09-21 NOTE — ASSESSMENT & PLAN NOTE
9/18/2018:  Op Day:  1.  Continue current pain control  2.  Repeat CBC, CMP in the morning  3.  Follow with any medical issues that arise.    Like to thank you Dr. Kinney for allowing me to be involved the care of this patient!    9/19/18:  Continue same Treatment                 Patient stable PO Day #1                 Repeat labs in AM    09/20/2018:  Continue new same treatment course                       Repeat labs in the a.m.                          Patient otherwise stable postop day 2     September 21, 2018:  Continue same treatment course                                      Consider starting p.o. intake soon  Repeat labs in the a.m.

## 2018-09-21 NOTE — PROGRESS NOTES
[facility] - ICU  General Surgery  Daily Note    Patient Name: Paolo Brower  MRN: [id number]  Admission Date: 9/18/2018  Attending Physician: Del Basilio MD   Consult Physician: Rene Kinney MD  Primary Care Provider: Tyrone Issa MD    Subjective:     Principle Problem: Colovesical fistula    Last 24 hour history:  9/19/18  Patient doing well status post colectomy with bladder repair yesterday.  No issues in the last 24 hr.  Pain well controlled.  No nausea or vomiting.  NG tube with approximately 500 cc bilious like output.  No bowel function yet.  Afebrile vital signs completely stable.  Patient incentive spirometer 2000.  Up and out of bed to chair yesterday evening.  No new complaints today.    9/20/18  No acute events last 24 hr.  Patient's pain well controlled.  IS 2750.  No nausea vomiting.  NG tube with 700 cc bilious output.  No bowel function yet.  Afebrile.  Vital signs stable.  Laboratory values improving.  White count 03696 improving.  No new complaints    9/21/18  No issues in the last 24 hr.  Passing flatus.  NG tube with 1200 cc output.  Incentive spirometer greater than 3 L.  Afebrile.  Vital signs stable.  Pain well controlled.  No new complaints.    Objective:     Vital Signs (Most Recent):  Temp: 98.1 °F (36.7 °C) (09/21/18 0815)  Pulse: 88 (09/21/18 0815)  Resp: 16 (09/21/18 0815)  BP: 123/79 (09/21/18 0800)  SpO2: (!) 93 % (09/21/18 0815) Vital Signs (24h Range):  Temp:  [97.7 °F (36.5 °C)-99.5 °F (37.5 °C)] 98.1 °F (36.7 °C)  Pulse:  [62-97] 88  Resp:  [0-27] 16  SpO2:  [93 %-98 %] 93 %  BP: (113-143)/() 123/79     Intake/Output last 24 hours:    Intake/Output Summary (Last 24 hours) at 9/21/2018 0958  Last data filed at 9/21/2018 0900  Gross per 24 hour   Intake 2317.5 ml   Output 4005 ml   Net -1687.5 ml       I/O last 3 completed shifts:  In: 4276.7 [I.V.:3466.7; NG/GT:210; IV Piggyback:600]  Out: 6415 [Urine:4815; Drains:1600]  I/O this  shift:  In: 150 [I.V.:150]  Out: -     Weight: 119.7 kg (264 lb)  Body mass index is 33.9 kg/m².    Gen: Wd Wn male currently in NAD  Heent: Nc/At, MMM  Eyes: Perrl, Eomi  Cv: RRR, no  M/g/r  Lung: Non-labored breathing, clear bilaterally  Abd: Soft, surgical site clean dry intact no signs or symptoms of infection, tenderness expected in the postoperative phase.    Significant Labs:  CBC:   Recent Labs   Lab  09/21/18   0450   WBC  10.96   RBC  4.59*   HGB  13.2*   HCT  37.7*   PLT  316   MCV  82   MCH  28.8   MCHC  35.0     BMP:   Recent Labs   Lab  09/19/18   0445   09/21/18   0450   GLU  107   < >  82   NA  135*   < >  134*   K  4.1   < >  3.7   CL  102   < >  99   CO2  26   < >  26   BUN  11   < >  14   CREATININE  0.9   < >  0.9   CALCIUM  8.3*   < >  8.6*   MG  2.1   --    --     < > = values in this interval not displayed.     CMP:   Recent Labs   Lab  09/21/18   0450   GLU  82   CALCIUM  8.6*   ALBUMIN  3.4*   PROT  6.3   NA  134*   K  3.7   CO2  26   CL  99   BUN  14   CREATININE  0.9   ALKPHOS  73   ALT  38   AST  17   BILITOT  0.6     LFTs:   Recent Labs   Lab  09/21/18   0450   ALT  38   AST  17   ALKPHOS  73   BILITOT  0.6   PROT  6.3   ALBUMIN  3.4*     Coagulation: No results for input(s): LABPROT, INR, APTT in the last 168 hours.  Specimen (12h ago, onward)    None        No results for input(s): COLORU, CLARITYU, SPECGRAV, PHUR, PROTEINUA, GLUCOSEU, BILIRUBINCON, BLOODU, WBCU, RBCU, BACTERIA, MUCUS, NITRITE, LEUKOCYTESUR, UROBILINOGEN, HYALINECASTS in the last 168 hours.    Cultures:    Microbiology Results (last 7 days)     ** No results found for the last 168 hours. **          Assessment:   Paolo Brower is a 48 y.o. male who presents with Colovesical fistula.    Active Diagnoses:    Diagnosis Date Noted POA    PRINCIPAL PROBLEM:  Colovesical fistula [N32.1] 08/15/2018 Yes      Problems Resolved During this Admission:     VTE Risk Mitigation (From admission, onward)        Ordered     heparin  (porcine) injection 5,000 Units  Every 8 hours      09/18/18 1615     IP VTE HIGH RISK PATIENT  Once      09/18/18 1615     Place BRUNO hose  Until discontinued      09/18/18 1615     Place sequential compression device  Until discontinued      09/18/18 1615          Medical Decision Making/Plan:  Patient with satisfactory postoperative recovery thus far.  Leukocytosis with left shift expected in the acute phase postsurgery.  This is improving.  Patient continues on Invanz therapy in light of acute on chronic inflammation associated with patient's colovesicular fistula.  Will plan for least a 5-7 day course of postoperative antibiotics.  Patient with pulmonary toilet ongoing an incentive spirometer at 3+ L.  DVT prophylaxis both mechanical and pharmacologic on going.  Decrease IV fluids.  Clamp NG tube. Anticipate removal of NG tube this afternoon.  Further management will depend upon clinical course. See orders.    Rene Kinney MD  General Surgery  [facility] - ICU

## 2018-09-21 NOTE — PLAN OF CARE
Problem: Patient Care Overview  Goal: Plan of Care Review   09/21/18 9778   Coping/Psychosocial   Plan Of Care Reviewed With patient

## 2018-09-21 NOTE — PROGRESS NOTES
[facility]  Hospital Medicine  Progress Note    Patient Name: Paolo Brower  MRN: [id number]  Patient Class: IP- Inpatient   Admission Date: 9/18/2018  Length of Stay: 3 days  Attending Physician: Del Basilio MD  Primary Care Provider: Tyrone Issa MD        Subjective:     Principal Problem:Colovesical fistula    HPI:  This is a 48-year-old male that is status post partial colectomy due to diverticular disease. Patient had a colovesicular fistula and was corrected today by Dr. Kinney.  I have been asked to admit this patient to my service for medical management and for surgical consult.  This patient surgery day was elective surgery and not emergent.  Patient has no known past medical history other than this current diagnosis and has been otherwise healthy.  Patient is doing well postoperatively with adequate pain management and otherwise no significant complaints.  He has no known drug allergies.  He takes no current medications.    Hospital Course:  Patient id was admitted this morning to the surgical suite and the surgery was performed by Dr. Kinney.  The surgery was without complications and he was transferred from recovery to the ICU for continued care.    Post Op Day 1:  Patient is doing well postop day 1 still having some pain as expected but otherwise is stable.  Some of this discussion with Dr. Kinney shows a plan to continue Joyce continue pain control and otherwise will follow as needed medically.  Patient is currently medically stable and having no other difficulty\    Postop day 2:  Patient is much improved this morning having less pain. No passing flatus yet.  Otherwise lab results appeared generally within normal ranges.  Patient has been active and up to the bedside in chair without difficulty    Post op day 3:  Patient has no complaints although not passing gas at this time.  Pain is well controlled and otherwise no new issues have come up.  Postop  course is going well as expected    Postop day 4.:  Patient is passing gas well.  Patient has been up ambulatory and in the chair and incentive spirometer showing normal values.  No other complaints at this time.    Interval History:  Patient passing gas now and is otherwise stable.    Review of Systems   Constitutional: Negative for activity change, appetite change, fatigue and fever.   HENT: Negative for congestion, ear discharge, mouth sores, nosebleeds, rhinorrhea, sinus pressure, sinus pain and tinnitus.    Eyes: Negative.  Negative for pain, redness and itching.   Respiratory: Negative for apnea, cough, choking, chest tightness, shortness of breath, wheezing and stridor.    Cardiovascular: Negative for chest pain, palpitations and leg swelling.   Gastrointestinal: Positive for abdominal pain. Negative for abdominal distention, anal bleeding, blood in stool, constipation and diarrhea.   Endocrine: Negative.    Genitourinary: Negative for difficulty urinating, flank pain, frequency and urgency.   Musculoskeletal: Negative for arthralgias, back pain, gait problem and myalgias.   Skin: Negative for color change and pallor.   Allergic/Immunologic: Negative.    Neurological: Negative for dizziness, facial asymmetry, weakness, light-headedness and headaches.   Hematological: Negative for adenopathy. Does not bruise/bleed easily.     Objective:     Vital Signs (Most Recent):  Temp: 98.2 °F (36.8 °C) (09/21/18 1200)  Pulse: 73 (09/21/18 1200)  Resp: 12 (09/21/18 1200)  BP: (!) 134/95 (09/21/18 1200)  SpO2: 96 % (09/21/18 1200) Vital Signs (24h Range):  Temp:  [97.7 °F (36.5 °C)-99.5 °F (37.5 °C)] 98.2 °F (36.8 °C)  Pulse:  [62-97] 73  Resp:  [0-27] 12  SpO2:  [91 %-98 %] 96 %  BP: (113-143)/() 134/95     Weight: 119.7 kg (264 lb)  Body mass index is 33.9 kg/m².    Intake/Output Summary (Last 24 hours) at 9/21/2018 1241  Last data filed at 9/21/2018 1200  Gross per 24 hour   Intake 2267.5 ml   Output 3780 ml   Net  -1512.5 ml      Physical Exam   Constitutional: He is oriented to person, place, and time. He appears well-developed and well-nourished.   HENT:   Head: Normocephalic and atraumatic.   Eyes: EOM are normal. Pupils are equal, round, and reactive to light.   Neck: Normal range of motion. Neck supple. No tracheal deviation present. No thyromegaly present.   Pulmonary/Chest: Effort normal and breath sounds normal.   Abdominal: Soft. Bowel sounds are normal. There is tenderness. There is no rebound and no guarding.   Musculoskeletal: Normal range of motion.   Lymphadenopathy:     He has no cervical adenopathy.   Neurological: He is alert and oriented to person, place, and time.   Skin: Skin is warm and dry. Capillary refill takes less than 2 seconds.   Psychiatric: He has a normal mood and affect. His behavior is normal. Judgment and thought content normal.       Significant Labs: All pertinent labs within the past 24 hours have been reviewed.    Significant Imaging: I have reviewed and interpreted all pertinent imaging results/findings within the past 24 hours.    Assessment/Plan:      * Colovesical fistula    9/18/2018:  Op Day:  1.  Continue current pain control  2.  Repeat CBC, CMP in the morning  3.  Follow with any medical issues that arise.    Like to thank you Dr. Kinney for allowing me to be involved the care of this patient!    9/19/18:  Continue same Treatment                 Patient stable PO Day #1                 Repeat labs in AM    09/20/2018:  Continue new same treatment course                       Repeat labs in the a.m.                          Patient otherwise stable postop day 2     September 21, 2018:  Continue same treatment course                                      Consider starting p.o. intake soon  Repeat labs in the a.m.          VTE Risk Mitigation (From admission, onward)        Ordered     heparin (porcine) injection 5,000 Units  Every 8 hours      09/18/18 1615     IP VTE HIGH RISK  PATIENT  Once      09/18/18 1615     Place BRUNO hose  Until discontinued      09/18/18 1615     Place sequential compression device  Until discontinued      09/18/18 1615              Del Basilio MD  Department of Hospital Medicine   [facility] - ICU

## 2018-09-21 NOTE — PLAN OF CARE
Problem: Pain, Acute (Adult)  Goal: Acceptable Pain Control/Comfort Level  Patient will demonstrate the desired outcomes by discharge/transition of care.  Outcome: Ongoing (interventions implemented as appropriate)   09/20/18 2004   Pain, Acute (Adult)   Acceptable Pain Control/Comfort Level making progress toward outcome       Comments: Reviewed with pt pain medication schedule. Instructed to report all pain /discomfort immediately to prevent pain from escalating. Verbalized understanding.

## 2018-09-21 NOTE — PLAN OF CARE
Problem: Patient Care Overview  Goal: Plan of Care Review  Pt has o2 in room, but off at bedside . Pulse ox is 95% room air. Hr 92. bbs fairly clear, very slight diminished in loqwer lobes.   Pt has a good cough, but non-prod.  Pt is using the incentive spirometor on his own also q1h w/a at at 3250!!!!  Pt tolerated tx well.

## 2018-09-21 NOTE — PLAN OF CARE
Problem: Infection, Risk/Actual (Adult)  Intervention: Manage Suspected/Actual Infection   09/21/18 1309   Safety Interventions   Isolation Precautions environmental surveillance   Infection Management aseptic technique maintained   Prevent/Manage Colorectal Surgical Infection   Fever Reduction/Comfort Measures aerosol temperature decreased

## 2018-09-21 NOTE — PLAN OF CARE
Problem: Fall Risk (Adult)  Goal: Identify Related Risk Factors and Signs and Symptoms  Related risk factors and signs and symptoms are identified upon initiation of Human Response Clinical Practice Guideline (CPG)  Outcome: Ongoing (interventions implemented as appropriate)  Pt instructed to use call light and notify RN prior to getting out of bed to prevent falls. Verbalized understganding.     Comments: See above

## 2018-09-21 NOTE — PLAN OF CARE
Problem: Fall Risk (Adult)  Intervention: Safety Precautions   09/21/18 1309   Safety Interventions   Safety Precautions emergency equipment at bedside

## 2018-09-22 PROCEDURE — 99024 POSTOP FOLLOW-UP VISIT: CPT | Mod: ,,, | Performed by: SURGERY

## 2018-09-22 PROCEDURE — 11000001 HC ACUTE MED/SURG PRIVATE ROOM

## 2018-09-22 PROCEDURE — C9113 INJ PANTOPRAZOLE SODIUM, VIA: HCPCS | Performed by: SURGERY

## 2018-09-22 PROCEDURE — 94799 UNLISTED PULMONARY SVC/PX: CPT

## 2018-09-22 PROCEDURE — 94640 AIRWAY INHALATION TREATMENT: CPT

## 2018-09-22 PROCEDURE — 99900035 HC TECH TIME PER 15 MIN (STAT)

## 2018-09-22 PROCEDURE — 63600175 PHARM REV CODE 636 W HCPCS: Performed by: SURGERY

## 2018-09-22 RX ORDER — IPRATROPIUM BROMIDE AND ALBUTEROL SULFATE 2.5; .5 MG/3ML; MG/3ML
3 SOLUTION RESPIRATORY (INHALATION) EVERY 6 HOURS PRN
Status: DISCONTINUED | OUTPATIENT
Start: 2018-09-22 | End: 2018-09-24 | Stop reason: HOSPADM

## 2018-09-22 RX ADMIN — ERTAPENEM SODIUM 1 G: 1 INJECTION, POWDER, LYOPHILIZED, FOR SOLUTION INTRAMUSCULAR; INTRAVENOUS at 04:09

## 2018-09-22 RX ADMIN — HEPARIN SODIUM 5000 UNITS: 5000 INJECTION, SOLUTION INTRAVENOUS; SUBCUTANEOUS at 09:09

## 2018-09-22 RX ADMIN — HYDROMORPHONE HYDROCHLORIDE 1 MG: 2 INJECTION INTRAMUSCULAR; INTRAVENOUS; SUBCUTANEOUS at 09:09

## 2018-09-22 RX ADMIN — ONDANSETRON HYDROCHLORIDE 4 MG: 2 INJECTION, SOLUTION INTRAMUSCULAR; INTRAVENOUS at 01:09

## 2018-09-22 RX ADMIN — HYDROMORPHONE HYDROCHLORIDE 1 MG: 2 INJECTION INTRAMUSCULAR; INTRAVENOUS; SUBCUTANEOUS at 01:09

## 2018-09-22 RX ADMIN — ACETAMINOPHEN 1000 MG: 10 INJECTION, SOLUTION INTRAVENOUS at 05:09

## 2018-09-22 RX ADMIN — HEPARIN SODIUM 5000 UNITS: 5000 INJECTION, SOLUTION INTRAVENOUS; SUBCUTANEOUS at 02:09

## 2018-09-22 RX ADMIN — PANTOPRAZOLE SODIUM 40 MG: 40 INJECTION, POWDER, LYOPHILIZED, FOR SOLUTION INTRAVENOUS at 09:09

## 2018-09-22 RX ADMIN — HEPARIN SODIUM 5000 UNITS: 5000 INJECTION, SOLUTION INTRAVENOUS; SUBCUTANEOUS at 05:09

## 2018-09-22 NOTE — ASSESSMENT & PLAN NOTE
9/18/2018:  Op Day:  1.  Continue current pain control  2.  Repeat CBC, CMP in the morning  3.  Follow with any medical issues that arise.    Like to thank you Dr. Kinney for allowing me to be involved the care of this patient!    9/19/18:  Continue same Treatment                 Patient stable PO Day #1                 Repeat labs in AM    09/20/2018:  Continue new same treatment course                       Repeat labs in the a.m.                          Patient otherwise stable postop day 2     September 21, 2018:  Continue same treatment course                                      Consider starting p.o. intake soon  Repeat labs in the a.m.  09/22/2018.  Patient is very stable today sitting up in bed doing very well continue IV antibiotics patient walked all patient is passing flatus.  The patient's lungs are clear he can do 3500 on IAS.  Will p.r.n. his updraft treatments.  Awaiting further instructions per Dr. Corea have the patient will be in the hospital to home under receiving IV antibiotics.  Dr. Corea will determine when increase the patient's p.o. intake.  Continue current level of care.

## 2018-09-22 NOTE — PLAN OF CARE
Problem: Patient Care Overview  Goal: Plan of Care Review  D/C AEROSOL TXS,  CONTINUE INCENTIVE SPIROMETOR., PER DR. MINA ORDER.

## 2018-09-22 NOTE — SUBJECTIVE & OBJECTIVE
Interval History:  The patient is very stable this morning doing very well.  Patient is sitting up in bed and eating a popsicle.  Very conversational this morning and feels very well.  Patient understands he will probably stand hospital till Monday receiving IV antibiotics . Dr. JAMES will see the patient today and determine whether not the patient can increased dietary intake.  Patient has flatus today doing well.  Patient has been ambulating aleman.  Lungs are clear will  p.r.n. and updraft treatments.  The patient can do 3500 on incentive spirometry.    Review of Systems   Constitutional: Negative for activity change, appetite change, chills, diaphoresis, fatigue, fever and unexpected weight change.   HENT: Negative for congestion, drooling, hearing loss and trouble swallowing.    Eyes: Negative for pain and visual disturbance.   Respiratory: Negative.  Negative for apnea, cough, choking, chest tightness, shortness of breath and wheezing.    Cardiovascular: Negative for chest pain, palpitations and leg swelling.   Gastrointestinal: Negative for abdominal distention, abdominal pain, blood in stool, constipation, diarrhea, nausea and vomiting.   Endocrine: Negative for polydipsia, polyphagia and polyuria.   Genitourinary: Negative for difficulty urinating, dysuria and flank pain.   Musculoskeletal: Negative for arthralgias, back pain, gait problem, joint swelling, neck pain and neck stiffness.   Skin: Negative for color change, rash and wound.   Allergic/Immunologic: Negative for environmental allergies, food allergies and immunocompromised state.   Neurological: Negative for dizziness, syncope, weakness, numbness and headaches.   Hematological: Negative for adenopathy.   Psychiatric/Behavioral: Negative for agitation, confusion and sleep disturbance. The patient is not nervous/anxious.      Objective:     Vital Signs (Most Recent):  Temp: 97.2 °F (36.2 °C) (09/22/18 0751)  Pulse: 89 (09/22/18 0751)  Resp: 16 (09/22/18  0751)  BP: (!) 141/85 (09/22/18 0751)  SpO2: 98 % (09/22/18 0751) Vital Signs (24h Range):  Temp:  [97.2 °F (36.2 °C)-98.4 °F (36.9 °C)] 97.2 °F (36.2 °C)  Pulse:  [69-91] 89  Resp:  [11-18] 16  SpO2:  [94 %-98 %] 98 %  BP: (126-147)/(71-95) 141/85     Weight: 119.7 kg (264 lb)  Body mass index is 33.9 kg/m².    Intake/Output Summary (Last 24 hours) at 9/22/2018 1039  Last data filed at 9/22/2018 0934  Gross per 24 hour   Intake 725 ml   Output 2050 ml   Net -1325 ml      Physical Exam   Constitutional: He is oriented to person, place, and time. He appears well-developed and well-nourished.   HENT:   Head: Normocephalic and atraumatic.   Right Ear: External ear normal.   Left Ear: External ear normal.   Nose: Nose normal.   Eyes: Conjunctivae, EOM and lids are normal. Pupils are equal, round, and reactive to light.   Neck: Trachea normal, normal range of motion and full passive range of motion without pain. Neck supple.   Cardiovascular: Normal rate, regular rhythm, S1 normal, S2 normal, normal heart sounds, intact distal pulses and normal pulses.   Pulmonary/Chest: Effort normal and breath sounds normal.   Abdominal: Soft. Normal aorta and bowel sounds are normal. There is tenderness.   Musculoskeletal: Normal range of motion.   Neurological: He is alert and oriented to person, place, and time. He has normal reflexes.   Skin: Skin is warm, dry and intact.   Psychiatric: He has a normal mood and affect. His speech is normal and behavior is normal. Judgment and thought content normal. Cognition and memory are normal.   Nursing note and vitals reviewed.      Significant Labs:   BMP:   Recent Labs   Lab  09/21/18   0450   GLU  82   NA  134*   K  3.7   CL  99   CO2  26   BUN  14   CREATININE  0.9   CALCIUM  8.6*     CBC:   Recent Labs   Lab  09/21/18   0450   WBC  10.96   HGB  13.2*   HCT  37.7*   PLT  316     CMP:   Recent Labs   Lab  09/21/18   0450   NA  134*   K  3.7   CL  99   CO2  26   GLU  82   BUN  14    CREATININE  0.9   CALCIUM  8.6*   PROT  6.3   ALBUMIN  3.4*   BILITOT  0.6   ALKPHOS  73   AST  17   ALT  38   ANIONGAP  9   EGFRNONAA  >60.0     All pertinent labs within the past 24 hours have been reviewed.    Significant Imaging: None today.

## 2018-09-22 NOTE — PROGRESS NOTES
[facility] Med Surg  General Surgery  Progress Note  09/22/2018    Patient Name: Paolo Brower  MRN: [id number]  Admission Date: 9/18/2018  Hospital Day: 4    POD #:  4      Interval History:  No complaints.  Pain controlled.  No nausea or vomiting.  Mobility good.  Expiratory effort good.    Vitals:  Afebrile.  Good vital signs. Good, improved room air oxygen saturations.    I/O:  Output slightly greater than input  UOP:  Good      Exam:   Gen - Comfortable.  Nontoxic.  No acute distress.  CV - Regular rate and rhythm.  Good perfusion.  No edema.  Pulm - Clear to auscultation.  Nonlabored.   Abd - Soft.  Nondistended.  Expected tenderness only.  Normoactive normopitched bowel sounds.   Integument - No rashes.  No decubiti.  No jaundice.  Incision healing well without erythema, edema, exudate, induration, fluctuance, crepitus, necrosis, discoloration, separation, etc.  Ext - No edema.  No cords.  No tenderness.      Lab Results:  No new lab results    Radiology Results:  No new radiology results    Pathology Results:  Pending      Assessment:  Continued satisfactory postoperative recovery.  No evident complications.      Plan:  Continued supportive care.  Discharge planning.        Oj Corea MD FACS

## 2018-09-22 NOTE — PROGRESS NOTES
[facility] Medicine  Progress Note    Patient Name: Paolo Brower  MRN: [id number]  Patient Class: IP- Inpatient   Admission Date: 9/18/2018  Length of Stay: 4 days  Attending Physician: Del Basilio MD  Primary Care Provider: Tyrone Issa MD        Subjective:     Principal Problem:Colovesical fistula    HPI:  This is a 48-year-old male that is status post partial colectomy due to diverticular disease. Patient had a colovesicular fistula and was corrected today by Dr. Kinney.  I have been asked to admit this patient to my service for medical management and for surgical consult.  This patient surgery day was elective surgery and not emergent.  Patient has no known past medical history other than this current diagnosis and has been otherwise healthy.  Patient is doing well postoperatively with adequate pain management and otherwise no significant complaints.  He has no known drug allergies.  He takes no current medications.    Hospital Course:  Patient id was admitted this morning to the surgical suite and the surgery was performed by Dr. Kinney.  The surgery was without complications and he was transferred from recovery to the ICU for continued care.    Post Op Day 1:  Patient is doing well postop day 1 still having some pain as expected but otherwise is stable.  Some of this discussion with Dr. Kinney shows a plan to continue Joyce continue pain control and otherwise will follow as needed medically.  Patient is currently medically stable and having no other difficulty\    Postop day 2:  Patient is much improved this morning having less pain. No passing flatus yet.  Otherwise lab results appeared generally within normal ranges.  Patient has been active and up to the bedside in chair without difficulty    Post op day 3:  Patient has no complaints although not passing gas at this time.  Pain is well controlled and otherwise no new issues have come up.   Postop course is going well as expected    Postop day 4.:  Patient is passing gas well.  Patient has been up ambulatory and in the chair and incentive spirometer showing normal values.  No other complaints at this time.  Nine hundred twenty 02/2018.  Postop day 5.  The patient is doing well flatus is being passed she is eating a popsicle sitting up in bed very conversational.  Continue current hospital course management.  Labs have been reviewed.  CBC is pending at this time.  Continue current level of care.    Interval History:  The patient is very stable this morning doing very well.  Patient is sitting up in bed and eating a popsicle.  Very conversational this morning and feels very well.  Patient understands he will probably stand hospital till Monday receiving IV antibiotics . Dr. JAMES will see the patient today and determine whether not the patient can increased dietary intake.  Patient has flatus today doing well.  Patient has been ambulating aleman.  Lungs are clear will  p.r.n. and updraft treatments.  The patient can do 3500 on incentive spirometry.    Review of Systems   Constitutional: Negative for activity change, appetite change, chills, diaphoresis, fatigue, fever and unexpected weight change.   HENT: Negative for congestion, drooling, hearing loss and trouble swallowing.    Eyes: Negative for pain and visual disturbance.   Respiratory: Negative.  Negative for apnea, cough, choking, chest tightness, shortness of breath and wheezing.    Cardiovascular: Negative for chest pain, palpitations and leg swelling.   Gastrointestinal: Negative for abdominal distention, abdominal pain, blood in stool, constipation, diarrhea, nausea and vomiting.   Endocrine: Negative for polydipsia, polyphagia and polyuria.   Genitourinary: Negative for difficulty urinating, dysuria and flank pain.   Musculoskeletal: Negative for arthralgias, back pain, gait problem, joint swelling, neck pain and neck stiffness.   Skin: Negative  for color change, rash and wound.   Allergic/Immunologic: Negative for environmental allergies, food allergies and immunocompromised state.   Neurological: Negative for dizziness, syncope, weakness, numbness and headaches.   Hematological: Negative for adenopathy.   Psychiatric/Behavioral: Negative for agitation, confusion and sleep disturbance. The patient is not nervous/anxious.      Objective:     Vital Signs (Most Recent):  Temp: 97.2 °F (36.2 °C) (09/22/18 0751)  Pulse: 89 (09/22/18 0751)  Resp: 16 (09/22/18 0751)  BP: (!) 141/85 (09/22/18 0751)  SpO2: 98 % (09/22/18 0751) Vital Signs (24h Range):  Temp:  [97.2 °F (36.2 °C)-98.4 °F (36.9 °C)] 97.2 °F (36.2 °C)  Pulse:  [69-91] 89  Resp:  [11-18] 16  SpO2:  [94 %-98 %] 98 %  BP: (126-147)/(71-95) 141/85     Weight: 119.7 kg (264 lb)  Body mass index is 33.9 kg/m².    Intake/Output Summary (Last 24 hours) at 9/22/2018 1039  Last data filed at 9/22/2018 0934  Gross per 24 hour   Intake 725 ml   Output 2050 ml   Net -1325 ml      Physical Exam   Constitutional: He is oriented to person, place, and time. He appears well-developed and well-nourished.   HENT:   Head: Normocephalic and atraumatic.   Right Ear: External ear normal.   Left Ear: External ear normal.   Nose: Nose normal.   Eyes: Conjunctivae, EOM and lids are normal. Pupils are equal, round, and reactive to light.   Neck: Trachea normal, normal range of motion and full passive range of motion without pain. Neck supple.   Cardiovascular: Normal rate, regular rhythm, S1 normal, S2 normal, normal heart sounds, intact distal pulses and normal pulses.   Pulmonary/Chest: Effort normal and breath sounds normal.   Abdominal: Soft. Normal aorta and bowel sounds are normal. There is tenderness.   Musculoskeletal: Normal range of motion.   Neurological: He is alert and oriented to person, place, and time. He has normal reflexes.   Skin: Skin is warm, dry and intact.   Psychiatric: He has a normal mood and affect. His  speech is normal and behavior is normal. Judgment and thought content normal. Cognition and memory are normal.   Nursing note and vitals reviewed.      Significant Labs:   BMP:   Recent Labs   Lab  09/21/18   0450   GLU  82   NA  134*   K  3.7   CL  99   CO2  26   BUN  14   CREATININE  0.9   CALCIUM  8.6*     CBC:   Recent Labs   Lab  09/21/18   0450   WBC  10.96   HGB  13.2*   HCT  37.7*   PLT  316     CMP:   Recent Labs   Lab  09/21/18   0450   NA  134*   K  3.7   CL  99   CO2  26   GLU  82   BUN  14   CREATININE  0.9   CALCIUM  8.6*   PROT  6.3   ALBUMIN  3.4*   BILITOT  0.6   ALKPHOS  73   AST  17   ALT  38   ANIONGAP  9   EGFRNONAA  >60.0     All pertinent labs within the past 24 hours have been reviewed.    Significant Imaging: None today.    Assessment/Plan:      * Colovesical fistula    9/18/2018:  Op Day:  1.  Continue current pain control  2.  Repeat CBC, CMP in the morning  3.  Follow with any medical issues that arise.    Like to thank you Dr. Kinney for allowing me to be involved the care of this patient!    9/19/18:  Continue same Treatment                 Patient stable PO Day #1                 Repeat labs in AM    09/20/2018:  Continue new same treatment course                       Repeat labs in the a.m.                          Patient otherwise stable postop day 2     September 21, 2018:  Continue same treatment course                                      Consider starting p.o. intake soon  Repeat labs in the a.m.  09/22/2018.  Patient is very stable today sitting up in bed doing very well continue IV antibiotics patient walked all patient is passing flatus.  The patient's lungs are clear he can do 3500 on IAS.  Will p.r.n. his updraft treatments.  Awaiting further instructions per Dr. Corea have the patient will be in the hospital to home under receiving IV antibiotics.  Dr. Corea will determine when increase the patient's p.o. intake.  Continue current level of care.          VTE Risk  Mitigation (From admission, onward)        Ordered     heparin (porcine) injection 5,000 Units  Every 8 hours      09/18/18 1615     IP VTE HIGH RISK PATIENT  Once      09/18/18 1615     Place BRUNO hose  Until discontinued      09/18/18 1615     Place sequential compression device  Until discontinued      09/18/18 1615              Jorge Arrieta MD  Department of Hospital Medicine   [facility] - Med Surg

## 2018-09-22 NOTE — PLAN OF CARE
Problem: Pain, Acute (Adult)  Intervention: Monitor/Manage Analgesia   09/21/18 2102   Manage Acute Burn Pain   Bowel Intervention privacy promoted;commode/bedpan at bedside;ambulation promoted   Safety Interventions   Medication Review/Management medications reviewed;dosing adjusted

## 2018-09-23 LAB
ALBUMIN SERPL BCP-MCNC: 3.3 G/DL
ALP SERPL-CCNC: 72 U/L
ALT SERPL W/O P-5'-P-CCNC: 56 U/L
ANION GAP SERPL CALC-SCNC: 10 MMOL/L
AST SERPL-CCNC: 43 U/L
BILIRUB SERPL-MCNC: 0.7 MG/DL
BUN SERPL-MCNC: 15 MG/DL
CALCIUM SERPL-MCNC: 8.9 MG/DL
CHLORIDE SERPL-SCNC: 100 MMOL/L
CO2 SERPL-SCNC: 27 MMOL/L
CREAT SERPL-MCNC: 0.9 MG/DL
EST. GFR  (AFRICAN AMERICAN): >60 ML/MIN/1.73 M^2
EST. GFR  (NON AFRICAN AMERICAN): >60 ML/MIN/1.73 M^2
GLUCOSE SERPL-MCNC: 91 MG/DL
POTASSIUM SERPL-SCNC: 3.6 MMOL/L
PROT SERPL-MCNC: 6.2 G/DL
SODIUM SERPL-SCNC: 137 MMOL/L

## 2018-09-23 PROCEDURE — 63600175 PHARM REV CODE 636 W HCPCS: Performed by: SURGERY

## 2018-09-23 PROCEDURE — 99024 POSTOP FOLLOW-UP VISIT: CPT | Mod: ,,, | Performed by: SURGERY

## 2018-09-23 PROCEDURE — 80053 COMPREHEN METABOLIC PANEL: CPT

## 2018-09-23 PROCEDURE — 11000001 HC ACUTE MED/SURG PRIVATE ROOM

## 2018-09-23 PROCEDURE — 25000003 PHARM REV CODE 250: Performed by: SURGERY

## 2018-09-23 PROCEDURE — 94760 N-INVAS EAR/PLS OXIMETRY 1: CPT

## 2018-09-23 PROCEDURE — C9113 INJ PANTOPRAZOLE SODIUM, VIA: HCPCS | Performed by: SURGERY

## 2018-09-23 RX ORDER — OXYCODONE AND ACETAMINOPHEN 10; 325 MG/1; MG/1
2 TABLET ORAL EVERY 6 HOURS PRN
Status: DISCONTINUED | OUTPATIENT
Start: 2018-09-23 | End: 2018-09-24 | Stop reason: HOSPADM

## 2018-09-23 RX ORDER — OXYCODONE AND ACETAMINOPHEN 10; 325 MG/1; MG/1
1 TABLET ORAL EVERY 6 HOURS PRN
Status: DISCONTINUED | OUTPATIENT
Start: 2018-09-23 | End: 2018-09-24 | Stop reason: HOSPADM

## 2018-09-23 RX ORDER — PANTOPRAZOLE SODIUM 40 MG/1
40 TABLET, DELAYED RELEASE ORAL DAILY
Status: DISCONTINUED | OUTPATIENT
Start: 2018-09-24 | End: 2018-09-24 | Stop reason: HOSPADM

## 2018-09-23 RX ADMIN — HEPARIN SODIUM 5000 UNITS: 5000 INJECTION, SOLUTION INTRAVENOUS; SUBCUTANEOUS at 02:09

## 2018-09-23 RX ADMIN — OXYCODONE HYDROCHLORIDE AND ACETAMINOPHEN 1 TABLET: 10; 325 TABLET ORAL at 09:09

## 2018-09-23 RX ADMIN — ERTAPENEM SODIUM 1 G: 1 INJECTION, POWDER, LYOPHILIZED, FOR SOLUTION INTRAMUSCULAR; INTRAVENOUS at 04:09

## 2018-09-23 RX ADMIN — PANTOPRAZOLE SODIUM 40 MG: 40 INJECTION, POWDER, LYOPHILIZED, FOR SOLUTION INTRAVENOUS at 09:09

## 2018-09-23 RX ADMIN — SODIUM CHLORIDE, POTASSIUM CHLORIDE, SODIUM LACTATE AND CALCIUM CHLORIDE: 600; 310; 30; 20 INJECTION, SOLUTION INTRAVENOUS at 05:09

## 2018-09-23 RX ADMIN — ONDANSETRON HYDROCHLORIDE 4 MG: 2 INJECTION, SOLUTION INTRAMUSCULAR; INTRAVENOUS at 09:09

## 2018-09-23 RX ADMIN — HEPARIN SODIUM 5000 UNITS: 5000 INJECTION, SOLUTION INTRAVENOUS; SUBCUTANEOUS at 05:09

## 2018-09-23 RX ADMIN — HEPARIN SODIUM 5000 UNITS: 5000 INJECTION, SOLUTION INTRAVENOUS; SUBCUTANEOUS at 09:09

## 2018-09-23 NOTE — PLAN OF CARE
Problem: Pain, Acute (Adult)  Goal: Identify Related Risk Factors and Signs and Symptoms  Related risk factors and signs and symptoms are identified upon initiation of Human Response Clinical Practice Guideline (CPG)  Outcome: Ongoing (interventions implemented as appropriate)  Pt educated about prn pain meds

## 2018-09-23 NOTE — ASSESSMENT & PLAN NOTE
9/18/2018:  Op Day:  1.  Continue current pain control  2.  Repeat CBC, CMP in the morning  3.  Follow with any medical issues that arise.    Like to thank you Dr. Kinney for allowing me to be involved the care of this patient!    9/19/18:  Continue same Treatment                 Patient stable PO Day #1                 Repeat labs in AM    09/20/2018:  Continue new same treatment course                       Repeat labs in the a.m.                          Patient otherwise stable postop day 2     September 21, 2018:  Continue same treatment course                                      Consider starting p.o. intake soon  Repeat labs in the a.m.  09/22/2018.  Patient is very stable today sitting up in bed doing very well continue IV antibiotics patient walked all patient is passing flatus.  The patient's lungs are clear he can do 3500 on IAS.  Will p.r.n. his updraft treatments.  Awaiting further instructions per Dr. Corea have the patient will be in the hospital to home under receiving IV antibiotics.  Dr. Corea will determine when increase the patient's p.o. intake.  Continue current level of care.   09/23/2018.  Patient is very stable is sitting in bed reading.  The patient has been passing gas without any problem has not had a bowel movement.  Patient is on liquid diet.  The patient desires to take a shower and increase his dietary intake if possible.  Patient will discuss this with Dr. Corea.  Laboratory values have been reviewed.  Continue current level of care.  Discharge planning per Dr. Corea.

## 2018-09-23 NOTE — PROGRESS NOTES
[facility] Surg  General Surgery  Progress Note  09/23/2018    Patient Name: Paolo Brower  MRN: [id number]  Admission Date: 9/18/2018  Hospital Day: 6    POD #:  5      Interval History:  No complaints.  Pain controlled.  No nausea or vomiting.  Mobility good.  Expiratory effort good.  Passing stool and flatus.  Hungry.    Vitals:  Afebrile.  Good vital signs. Good, room air oxygen saturations.    I/O:  Output greater than input  UOP:  Good      Exam:   Gen - Comfortable.  Nontoxic.  No acute distress.  CV - Regular rate and rhythm.  Good perfusion.  No edema.  Pulm - Clear to auscultation.  Nonlabored.   Abd - Soft.  Nondistended.  Expected tenderness only.  Normoactive normopitched bowel sounds.   Integument - No rashes.  No decubiti.  No jaundice.  Incision healing well without erythema, edema, exudate, induration, fluctuance, crepitus, necrosis, discoloration, separation, etc.  Ext - No edema.  No cords.  No tenderness.      Lab Results:  Electrolytes are all in the range of normal.  BUN and creatinine good.  Euglycemic.  Mild hypoalbuminemia, expected.  Mild transaminitis.  No hyperbilirubinemia.  Alkaline phosphatase normal.    Radiology Results:  No new radiology results    Pathology Results:  Pending      Assessment:  Continued satisfactory postoperative recovery.  No evident complications.  Anticipate discharge tomorrow.      Plan:  Continued supportive care.  Discharge planning.  Advance diet.        Oj Corea MD FACS

## 2018-09-23 NOTE — SUBJECTIVE & OBJECTIVE
Interval History:  No new complaints today.  Patient is sitting in the bed very comfortable.  He has been passing gas and has been taking his liquid diet.  The patient will discuss with  and the increasing diet discharge planning and the possibility of a shower.  Laboratory values have been reviewed.  Will continue current level of care.    Review of Systems   Constitutional: Negative for activity change, appetite change, chills, diaphoresis, fatigue, fever and unexpected weight change.   HENT: Negative for congestion, drooling, hearing loss and trouble swallowing.    Eyes: Negative for pain and visual disturbance.   Respiratory: Negative.  Negative for apnea, cough, choking, chest tightness, shortness of breath and wheezing.    Cardiovascular: Negative for chest pain, palpitations and leg swelling.   Gastrointestinal: Negative for abdominal distention, abdominal pain, blood in stool, constipation, diarrhea, nausea and vomiting.   Endocrine: Negative for polydipsia, polyphagia and polyuria.   Genitourinary: Negative for difficulty urinating, dysuria and flank pain.   Musculoskeletal: Negative for arthralgias, back pain, gait problem, joint swelling, neck pain and neck stiffness.   Skin: Negative for color change, rash and wound.   Allergic/Immunologic: Negative for environmental allergies, food allergies and immunocompromised state.   Neurological: Negative for dizziness, syncope, weakness, numbness and headaches.   Hematological: Negative for adenopathy.   Psychiatric/Behavioral: Negative for agitation, confusion and sleep disturbance. The patient is not nervous/anxious.      Objective:     Vital Signs (Most Recent):  Temp: 96.8 °F (36 °C) (09/23/18 0717)  Pulse: 84 (09/23/18 0733)  Resp: 16 (09/23/18 0717)  BP: 128/85 (09/23/18 0717)  SpO2: 96 % (09/23/18 0733) Vital Signs (24h Range):  Temp:  [96.8 °F (36 °C)-98.9 °F (37.2 °C)] 96.8 °F (36 °C)  Pulse:  [73-88] 84  Resp:  [16-18] 16  SpO2:  [94 %-98 %] 96  %  BP: (124-149)/(67-87) 128/85     Weight: 119.7 kg (264 lb)  Body mass index is 33.9 kg/m².    Intake/Output Summary (Last 24 hours) at 9/23/2018 0841  Last data filed at 9/23/2018 0700  Gross per 24 hour   Intake 1140 ml   Output 2875 ml   Net -1735 ml      Physical Exam   Constitutional: He is oriented to person, place, and time. He appears well-developed and well-nourished.   HENT:   Head: Normocephalic and atraumatic.   Right Ear: External ear normal.   Left Ear: External ear normal.   Nose: Nose normal.   Eyes: Conjunctivae, EOM and lids are normal. Pupils are equal, round, and reactive to light.   Neck: Trachea normal, normal range of motion and full passive range of motion without pain. Neck supple.   Cardiovascular: Normal rate, regular rhythm, S1 normal, S2 normal, normal heart sounds, intact distal pulses and normal pulses.   Pulmonary/Chest: Effort normal and breath sounds normal.   Abdominal: Soft. Normal aorta and bowel sounds are normal.   Abdominal bandage is clean and exam is normal except for some minimal tenderness associated with the incision.   Musculoskeletal: Normal range of motion.   Neurological: He is alert and oriented to person, place, and time. He has normal reflexes.   Skin: Skin is warm, dry and intact.   Psychiatric: He has a normal mood and affect. His speech is normal and behavior is normal. Judgment and thought content normal. Cognition and memory are normal.   Nursing note and vitals reviewed.      Significant Labs:   BMP:   Recent Labs   Lab  09/23/18   0517   GLU  91   NA  137   K  3.6   CL  100   CO2  27   BUN  15   CREATININE  0.9   CALCIUM  8.9     CBC: No results for input(s): WBC, HGB, HCT, PLT in the last 48 hours.  CMP:   Recent Labs   Lab  09/23/18   0517   NA  137   K  3.6   CL  100   CO2  27   GLU  91   BUN  15   CREATININE  0.9   CALCIUM  8.9   PROT  6.2   ALBUMIN  3.3*   BILITOT  0.7   ALKPHOS  72   AST  43*   ALT  56*   ANIONGAP  10   EGFRNONAA  >60.0        Significant Imaging: Nonepast 24 hr.

## 2018-09-23 NOTE — PROGRESS NOTES
[facility] Medicine  Progress Note    Patient Name: Paolo Brower  MRN: [id number]  Patient Class: IP- Inpatient   Admission Date: 9/18/2018  Length of Stay: 5 days  Attending Physician: Del Basilio MD  Primary Care Provider: Tyrone Issa MD        Subjective:     Principal Problem:Colovesical fistula    HPI:  This is a 48-year-old male that is status post partial colectomy due to diverticular disease. Patient had a colovesicular fistula and was corrected today by Dr. Kinney.  I have been asked to admit this patient to my service for medical management and for surgical consult.  This patient surgery day was elective surgery and not emergent.  Patient has no known past medical history other than this current diagnosis and has been otherwise healthy.  Patient is doing well postoperatively with adequate pain management and otherwise no significant complaints.  He has no known drug allergies.  He takes no current medications.    Hospital Course:  Patient id was admitted this morning to the surgical suite and the surgery was performed by Dr. Kinney.  The surgery was without complications and he was transferred from recovery to the ICU for continued care.    Post Op Day 1:  Patient is doing well postop day 1 still having some pain as expected but otherwise is stable.  Some of this discussion with Dr. Kinney shows a plan to continue Joyce continue pain control and otherwise will follow as needed medically.  Patient is currently medically stable and having no other difficulty\    Postop day 2:  Patient is much improved this morning having less pain. No passing flatus yet.  Otherwise lab results appeared generally within normal ranges.  Patient has been active and up to the bedside in chair without difficulty    Post op day 3:  Patient has no complaints although not passing gas at this time.  Pain is well controlled and otherwise no new issues have come up.   Postop course is going well as expected    Postop day 4.:  Patient is passing gas well.  Patient has been up ambulatory and in the chair and incentive spirometer showing normal values.  No other complaints at this time.  09/22/2018..  Postop day 5.  The patient is doing well flatus is being passed she is eating a popsicle sitting up in bed very conversational.  Continue current hospital course management.  Labs have been reviewed.  CBC is pending at this time.  Continue current level of care.  09/23/2018.  Postop day 6.  The patient has no complaints today the patient is passing gas he is on liquid diet.  Patient has not had a bowel movement.  Patient is desiring to take a bath early swore she has had hair of discussed with him discussing with  and he will not take takes shower at this time.  Patient also discussed with Dr. Corea increasing his diet intake.  Discharge planning in progress per Dr. Corea.  Continue current level of care antibiotics and lab has been monitored.    Interval History:  No new complaints today.  Patient is sitting in the bed very comfortable.  He has been passing gas and has been taking his liquid diet.  The patient will discuss with  and the increasing diet discharge planning and the possibility of a shower.  Laboratory values have been reviewed.  Will continue current level of care.    Review of Systems   Constitutional: Negative for activity change, appetite change, chills, diaphoresis, fatigue, fever and unexpected weight change.   HENT: Negative for congestion, drooling, hearing loss and trouble swallowing.    Eyes: Negative for pain and visual disturbance.   Respiratory: Negative.  Negative for apnea, cough, choking, chest tightness, shortness of breath and wheezing.    Cardiovascular: Negative for chest pain, palpitations and leg swelling.   Gastrointestinal: Negative for abdominal distention, abdominal pain, blood in stool, constipation, diarrhea, nausea and vomiting.    Endocrine: Negative for polydipsia, polyphagia and polyuria.   Genitourinary: Negative for difficulty urinating, dysuria and flank pain.   Musculoskeletal: Negative for arthralgias, back pain, gait problem, joint swelling, neck pain and neck stiffness.   Skin: Negative for color change, rash and wound.   Allergic/Immunologic: Negative for environmental allergies, food allergies and immunocompromised state.   Neurological: Negative for dizziness, syncope, weakness, numbness and headaches.   Hematological: Negative for adenopathy.   Psychiatric/Behavioral: Negative for agitation, confusion and sleep disturbance. The patient is not nervous/anxious.      Objective:     Vital Signs (Most Recent):  Temp: 96.8 °F (36 °C) (09/23/18 0717)  Pulse: 84 (09/23/18 0733)  Resp: 16 (09/23/18 0717)  BP: 128/85 (09/23/18 0717)  SpO2: 96 % (09/23/18 0733) Vital Signs (24h Range):  Temp:  [96.8 °F (36 °C)-98.9 °F (37.2 °C)] 96.8 °F (36 °C)  Pulse:  [73-88] 84  Resp:  [16-18] 16  SpO2:  [94 %-98 %] 96 %  BP: (124-149)/(67-87) 128/85     Weight: 119.7 kg (264 lb)  Body mass index is 33.9 kg/m².    Intake/Output Summary (Last 24 hours) at 9/23/2018 0841  Last data filed at 9/23/2018 0700  Gross per 24 hour   Intake 1140 ml   Output 2875 ml   Net -1735 ml      Physical Exam   Constitutional: He is oriented to person, place, and time. He appears well-developed and well-nourished.   HENT:   Head: Normocephalic and atraumatic.   Right Ear: External ear normal.   Left Ear: External ear normal.   Nose: Nose normal.   Eyes: Conjunctivae, EOM and lids are normal. Pupils are equal, round, and reactive to light.   Neck: Trachea normal, normal range of motion and full passive range of motion without pain. Neck supple.   Cardiovascular: Normal rate, regular rhythm, S1 normal, S2 normal, normal heart sounds, intact distal pulses and normal pulses.   Pulmonary/Chest: Effort normal and breath sounds normal.   Abdominal: Soft. Normal aorta and bowel  sounds are normal.   Abdominal bandage is clean and exam is normal except for some minimal tenderness associated with the incision.   Musculoskeletal: Normal range of motion.   Neurological: He is alert and oriented to person, place, and time. He has normal reflexes.   Skin: Skin is warm, dry and intact.   Psychiatric: He has a normal mood and affect. His speech is normal and behavior is normal. Judgment and thought content normal. Cognition and memory are normal.   Nursing note and vitals reviewed.      Significant Labs:   BMP:   Recent Labs   Lab  09/23/18   0517   GLU  91   NA  137   K  3.6   CL  100   CO2  27   BUN  15   CREATININE  0.9   CALCIUM  8.9     CBC: No results for input(s): WBC, HGB, HCT, PLT in the last 48 hours.  CMP:   Recent Labs   Lab  09/23/18   0517   NA  137   K  3.6   CL  100   CO2  27   GLU  91   BUN  15   CREATININE  0.9   CALCIUM  8.9   PROT  6.2   ALBUMIN  3.3*   BILITOT  0.7   ALKPHOS  72   AST  43*   ALT  56*   ANIONGAP  10   EGFRNONAA  >60.0       Significant Imaging: Nonepast 24 hr.    Assessment/Plan:      * Colovesical fistula    9/18/2018:  Op Day:  1.  Continue current pain control  2.  Repeat CBC, CMP in the morning  3.  Follow with any medical issues that arise.    Like to thank you Dr. Kinney for allowing me to be involved the care of this patient!    9/19/18:  Continue same Treatment                 Patient stable PO Day #1                 Repeat labs in AM    09/20/2018:  Continue new same treatment course                       Repeat labs in the a.m.                          Patient otherwise stable postop day 2     September 21, 2018:  Continue same treatment course                                      Consider starting p.o. intake soon  Repeat labs in the a.m.  09/22/2018.  Patient is very stable today sitting up in bed doing very well continue IV antibiotics patient walked all patient is passing flatus.  The patient's lungs are clear he can do 3500 on IAS.  Will  p.r.n. his updraft treatments.  Awaiting further instructions per Dr. Corea have the patient will be in the hospital to home under receiving IV antibiotics.  Dr. Corea will determine when increase the patient's p.o. intake.  Continue current level of care.   09/23/2018.  Patient is very stable is sitting in bed reading.  The patient has been passing gas without any problem has not had a bowel movement.  Patient is on liquid diet.  The patient desires to take a shower and increase his dietary intake if possible.  Patient will discuss this with Dr. Corea.  Laboratory values have been reviewed.  Continue current level of care.  Discharge planning per Dr. Corea.          VTE Risk Mitigation (From admission, onward)        Ordered     heparin (porcine) injection 5,000 Units  Every 8 hours      09/18/18 1615     IP VTE HIGH RISK PATIENT  Once      09/18/18 1615     Place BRUNO hose  Until discontinued      09/18/18 1615     Place sequential compression device  Until discontinued      09/18/18 1615              Jorge Arrieta MD  Department of Hospital Medicine   [facility] - Med Surg

## 2018-09-23 NOTE — PLAN OF CARE
Problem: Patient Care Overview  Goal: Plan of Care Review  PULSE OX IS 96% ON ROOM AIR, HR 84.  NO O2.  PT HAS AEROSOL TXS PRN,  PT DENIES NEEDING TX NOW, AND HE KNOWS TO CALL IF TX IS NEEDED.

## 2018-09-23 NOTE — NURSING
1915>Pt walking in aleman. Not in room.   0600>Educated pt about need for IV site change. Pt refuses. Site WDL. Reinforced dressing c tape.

## 2018-09-24 VITALS
TEMPERATURE: 98 F | RESPIRATION RATE: 18 BRPM | HEIGHT: 74 IN | HEART RATE: 74 BPM | OXYGEN SATURATION: 96 % | SYSTOLIC BLOOD PRESSURE: 132 MMHG | WEIGHT: 264 LBS | DIASTOLIC BLOOD PRESSURE: 79 MMHG | BODY MASS INDEX: 33.88 KG/M2

## 2018-09-24 PROCEDURE — 25000003 PHARM REV CODE 250: Performed by: SURGERY

## 2018-09-24 PROCEDURE — 94761 N-INVAS EAR/PLS OXIMETRY MLT: CPT

## 2018-09-24 PROCEDURE — 63600175 PHARM REV CODE 636 W HCPCS: Performed by: SURGERY

## 2018-09-24 RX ORDER — OXYCODONE AND ACETAMINOPHEN 5; 325 MG/1; MG/1
1 TABLET ORAL EVERY 4 HOURS PRN
Qty: 30 TABLET | Refills: 0 | Status: SHIPPED | OUTPATIENT
Start: 2018-09-24 | End: 2018-10-04

## 2018-09-24 RX ADMIN — HEPARIN SODIUM 5000 UNITS: 5000 INJECTION, SOLUTION INTRAVENOUS; SUBCUTANEOUS at 05:09

## 2018-09-24 RX ADMIN — HEPARIN SODIUM 5000 UNITS: 5000 INJECTION, SOLUTION INTRAVENOUS; SUBCUTANEOUS at 01:09

## 2018-09-24 RX ADMIN — PANTOPRAZOLE SODIUM 40 MG: 40 TABLET, DELAYED RELEASE ORAL at 08:09

## 2018-09-24 NOTE — NURSING
Discharge instructions and medication reviewed with patient, verbalized understanding. Patient instructed to keep dressing dry and intact and to not submerge underwater, verbalized understanding.  Patient ambulated out to private vehicle upon discharge at 1515.  No distress noted or verbalized by patient at this time.

## 2018-09-24 NOTE — PLAN OF CARE
09/24/18 1259   Discharge Reassessment   Assessment Type Discharge Planning Reassessment   Discharge plan remains the same: Yes   Provided patient/caregiver education on the expected discharge date and the discharge plan Yes   Discharge Plan A Home with family   Change in patient condition or support system No   Patient choice form signed by patient/caregiver N/A   Explained to the the patient/caregiver why the discharge planned changed: Yes   Involved the patient/caregiver in establishing a new discharge plan: Yes   Patient states cho was removed. States does not need home health when discharged. Waiting for doctor to make rounds so can go home. Denies any discharge needs at this time.

## 2018-09-24 NOTE — PLAN OF CARE
09/24/18 1507   Final Note   Assessment Type Final Discharge Note   Discharge Disposition Home   What phone number can be called within the next 1-3 days to see how you are doing after discharge? [phone]   Hospital Follow Up  Appt(s) scheduled? Yes   Discharge plans and expectations educations in teach back method with documentation complete? Yes   Verbal & written follow up appointment given to patient. Demonstrated understanding by putting appointment on his calendar. Denies any other discharge needs at this time.

## 2018-09-24 NOTE — NURSING
Patient has voided 200cc of clear yellow urine since the removal of the cho catheter at 0900 this am.

## 2018-09-24 NOTE — ASSESSMENT & PLAN NOTE
9/18/2018:  Op Day:  1.  Continue current pain control  2.  Repeat CBC, CMP in the morning  3.  Follow with any medical issues that arise.    Like to thank you Dr. Kinney for allowing me to be involved the care of this patient!    9/19/18:  Continue same Treatment                 Patient stable PO Day #1                 Repeat labs in AM    09/20/2018:  Continue new same treatment course                       Repeat labs in the a.m.                          Patient otherwise stable postop day 2     September 21, 2018:  Continue same treatment course                                      Consider starting p.o. intake soon  Repeat labs in the a.m.  09/22/2018.  Patient is very stable today sitting up in bed doing very well continue IV antibiotics patient walked all patient is passing flatus.  The patient's lungs are clear he can do 3500 on IAS.  Will p.r.n. his updraft treatments.  Awaiting further instructions per Dr. Corea have the patient will be in the hospital to home under receiving IV antibiotics.  Dr. Corea will determine when increase the patient's p.o. intake.  Continue current level of care.   09/23/2018.  Patient is very stable is sitting in bed reading.  The patient has been passing gas without any problem has not had a bowel movement.  Patient is on liquid diet.  The patient desires to take a shower and increase his dietary intake if possible.  Patient will discuss this with Dr. Corea.  Laboratory values have been reviewed.  Continue current level of care.  Discharge planning per Dr. Corea.    09/24/2018:  1.  Discharge to home                       2.  Follow up with Dr. Kinney in 3 days on Thursday this week                       3.  Continue home medications, prescriptions given by Dr. Kinney                       4.  Continue and to advance diet at home follow-up with Dr. Kinney of as stated

## 2018-09-24 NOTE — DISCHARGE SUMMARY
[facility] Medicine  Discharge Summary      Patient Name: Paolo Brower  MRN: [id number]  Admission Date: 9/18/2018  Hospital Length of Stay: 6 days  Discharge Date and Time:  09/24/2018 2:46 PM  Attending Physician: Del Basilio MD   Discharging Provider: Del Basilio MD  Primary Care Provider: Tyrone Issa MD      HPI:   This is a 48-year-old male that is status post partial colectomy due to diverticular disease. Patient had a colovesicular fistula and was corrected today by Dr. Kinnye.  I have been asked to admit this patient to my service for medical management and for surgical consult.  This patient surgery day was elective surgery and not emergent.  Patient has no known past medical history other than this current diagnosis and has been otherwise healthy.  Patient is doing well postoperatively with adequate pain management and otherwise no significant complaints.  He has no known drug allergies.  He takes no current medications.    Procedure(s) (LRB):  COLECTOMY (N/A)  SURGICAL PROCEDURE, COLONOSCOPIC  REPAIR, BLADDER  RESECTION, COLON, LOW ANTERIOR      Hospital Course:   Patient id was admitted this morning to the surgical suite and the surgery was performed by Dr. Kinney.  The surgery was without complications and he was transferred from recovery to the ICU for continued care.    Post Op Day 1:  Patient is doing well postop day 1 still having some pain as expected but otherwise is stable.  Some of this discussion with Dr. Kinney shows a plan to continue Joyce continue pain control and otherwise will follow as needed medically.  Patient is currently medically stable and having no other difficulty\    Postop day 2:  Patient is much improved this morning having less pain. No passing flatus yet.  Otherwise lab results appeared generally within normal ranges.  Patient has been active and up to the bedside in chair without difficulty    Post op day  3:  Patient has no complaints although not passing gas at this time.  Pain is well controlled and otherwise no new issues have come up.  Postop course is going well as expected    Postop day 4.:  Patient is passing gas well.  Patient has been up ambulatory and in the chair and incentive spirometer showing normal values.  No other complaints at this time.  09/22/2018..  Postop day 5.  The patient is doing well flatus is being passed she is eating a popsicle sitting up in bed very conversational.  Continue current hospital course management.  Labs have been reviewed.  CBC is pending at this time.  Continue current level of care.  09/23/2018.  Postop day 6.  The patient has no complaints today the patient is passing gas he is on liquid diet.  Patient has not had a bowel movement.  Patient is desiring to take a bath early swore she has had hair of discussed with him discussing with  and he will not take takes shower at this time.  Patient also discussed with Dr. Corea increasing his diet intake.  Discharge planning in progress per Dr. Corea.  Continue current level of care antibiotics and lab has been monitored.    9/24/2018L  1.  Discharge patient to home  2.  Advanced status tolerated  3.  Follow-up with Dr. Enrique on Thursday of this week.  4.  Call if any worsening or any changes.  Prescriptions given by Dr. Enrique     Consults:   Consults (From admission, onward)        Status Ordering Provider     IP consult to case management  Once     Provider:  (Not yet assigned)    Acknowledged MARK ENRIQUE          * Colovesical fistula    9/18/2018:  Op Day:  1.  Continue current pain control  2.  Repeat CBC, CMP in the morning  3.  Follow with any medical issues that arise.    Like to thank you Dr. Enrique for allowing me to be involved the care of this patient!    9/19/18:  Continue same Treatment                 Patient stable PO Day #1                 Repeat labs in AM    09/20/2018:  Continue  new same treatment course                       Repeat labs in the a.m.                          Patient otherwise stable postop day 2     September 21, 2018:  Continue same treatment course                                      Consider starting p.o. intake soon  Repeat labs in the a.m.  09/22/2018.  Patient is very stable today sitting up in bed doing very well continue IV antibiotics patient walked all patient is passing flatus.  The patient's lungs are clear he can do 3500 on IAS.  Will p.r.n. his updraft treatments.  Awaiting further instructions per Dr. oCrea have the patient will be in the hospital to home under receiving IV antibiotics.  Dr. Corea will determine when increase the patient's p.o. intake.  Continue current level of care.   09/23/2018.  Patient is very stable is sitting in bed reading.  The patient has been passing gas without any problem has not had a bowel movement.  Patient is on liquid diet.  The patient desires to take a shower and increase his dietary intake if possible.  Patient will discuss this with Dr. Corea.  Laboratory values have been reviewed.  Continue current level of care.  Discharge planning per Dr. Corea.    09/24/2018:  1.  Discharge to home                       2.  Follow up with Dr. Kinney in 3 days on Thursday this week                       3.  Continue home medications, prescriptions given by Dr. Kinney                       4.  Continue and to advance diet at home follow-up with Dr. Kinney of as stated          Final Active Diagnoses:    Diagnosis Date Noted POA    PRINCIPAL PROBLEM:  Colovesical fistula [N32.1] 08/15/2018 Yes    Encounter for postoperative care [Z48.89]  Not Applicable      Problems Resolved During this Admission:       Discharged Condition: good    Disposition:     Follow Up:  Follow-up Information     Rene Kinney MD In 3 days.    Specialty:  General Surgery  Contact information:  [address] [facility] [place]  [id number] [phone]             Rene Kinney MD In 3 days.    Specialty:  General Surgery  Contact information:  [address] [facility] [place] [address] [phone]                 Patient Instructions:   No discharge procedures on file.    Significant Diagnostic Studies: Labs: All labs within the past 24 hours have been reviewed    Pending Diagnostic Studies:     None         Medications:  Reconciled Home Medications:      Medication List      START taking these medications    oxyCODONE-acetaminophen 5-325 mg per tablet  Commonly known as:  PERCOCET  Take 1 tablet by mouth every 4 (four) hours as needed for Pain.        CONTINUE taking these medications    sulfamethoxazole-trimethoprim 800-160mg 800-160 mg Tab  Commonly known as:  BACTRIM DS  Take 1 tablet by mouth 2 (two) times daily. for 20 days        STOP taking these medications    phenazopyridine 200 MG tablet  Commonly known as:  PYRIDIUM            Indwelling Lines/Drains at time of discharge:   Lines/Drains/Airways          None          Time spent on the discharge of patient: 30 minutes  Patient was seen and examined on the date of discharge and determined to be suitable for discharge.         Del Basilio MD  Department of Hospital Medicine  [facility] - Med Surg

## 2018-09-24 NOTE — PROGRESS NOTES
[facility] - ICU  General Surgery  Daily Note    Patient Name: Paolo Brower  MRN: [id number]  Admission Date: 9/18/2018  Attending Physician: Del Basilio MD   Consult Physician: Rene Kinney MD  Primary Care Provider: Tyrone Issa MD    Subjective:     Principle Problem: Colovesical fistula    Last 24 hour history:  9/19/18  Patient doing well status post colectomy with bladder repair yesterday.  No issues in the last 24 hr.  Pain well controlled.  No nausea or vomiting.  NG tube with approximately 500 cc bilious like output.  No bowel function yet.  Afebrile vital signs completely stable.  Patient incentive spirometer 2000.  Up and out of bed to chair yesterday evening.  No new complaints today.    9/20/18  No acute events last 24 hr.  Patient's pain well controlled.  IS 2750.  No nausea vomiting.  NG tube with 700 cc bilious output.  No bowel function yet.  Afebrile.  Vital signs stable.  Laboratory values improving.  White count 67800 improving.  No new complaints    9/21/18  No issues in the last 24 hr.  Passing flatus.  NG tube with 1200 cc output.  Incentive spirometer greater than 3 L.  Afebrile.  Vital signs stable.  Pain well controlled.  No new complaints.    9/24/18  No issues in the last 24 hr.  Afebrile.  Vital signs stable.  Having normal bowel function.  No nausea or vomiting.  No new complaints.    Objective:     Vital Signs (Most Recent):  Temp: 97.5 °F (36.4 °C) (09/24/18 1008)  Pulse: 83 (09/24/18 1008)  Resp: 18 (09/24/18 1008)  BP: 132/78 (09/24/18 1008)  SpO2: 95 % (09/24/18 1008) Vital Signs (24h Range):  Temp:  [96.8 °F (36 °C)-98.7 °F (37.1 °C)] 97.5 °F (36.4 °C)  Pulse:  [73-89] 83  Resp:  [16-18] 18  SpO2:  [94 %-98 %] 95 %  BP: (121-138)/(75-85) 132/78     Intake/Output last 24 hours:    Intake/Output Summary (Last 24 hours) at 9/24/2018 1358  Last data filed at 9/24/2018 1322  Gross per 24 hour   Intake 2060 ml   Output 3050 ml   Net -990 ml        I/O last 3 completed shifts:  In: 2260 [P.O.:1260; I.V.:900; IV Piggyback:100]  Out: 3575 [Urine:3575]  I/O this shift:  In: 1240 [P.O.:1240]  Out: 1500 [Urine:1500]    Weight: 119.7 kg (264 lb)  Body mass index is 33.9 kg/m².    Gen: Wd Wn male currently in NAD  Heent: Nc/At, MMM  Eyes: Perrl, Eomi  Cv: RRR, no  M/g/r  Lung: Non-labored breathing, clear bilaterally  Abd: Soft, surgical site clean dry intact no signs or symptoms of infection, nontender    Significant Labs:  CBC:   Recent Labs   Lab  09/21/18   0450   WBC  10.96   RBC  4.59*   HGB  13.2*   HCT  37.7*   PLT  316   MCV  82   MCH  28.8   MCHC  35.0     BMP:   Recent Labs   Lab  09/19/18   0445   09/23/18   0517   GLU  107   < >  91   NA  135*   < >  137   K  4.1   < >  3.6   CL  102   < >  100   CO2  26   < >  27   BUN  11   < >  15   CREATININE  0.9   < >  0.9   CALCIUM  8.3*   < >  8.9   MG  2.1   --    --     < > = values in this interval not displayed.     CMP:   Recent Labs   Lab  09/23/18   0517   GLU  91   CALCIUM  8.9   ALBUMIN  3.3*   PROT  6.2   NA  137   K  3.6   CO2  27   CL  100   BUN  15   CREATININE  0.9   ALKPHOS  72   ALT  56*   AST  43*   BILITOT  0.7     LFTs:   Recent Labs   Lab  09/23/18   0517   ALT  56*   AST  43*   ALKPHOS  72   BILITOT  0.7   PROT  6.2   ALBUMIN  3.3*     Coagulation: No results for input(s): LABPROT, INR, APTT in the last 168 hours.  Specimen (12h ago, onward)    None        No results for input(s): COLORU, CLARITYU, SPECGRAV, PHUR, PROTEINUA, GLUCOSEU, BILIRUBINCON, BLOODU, WBCU, RBCU, BACTERIA, MUCUS, NITRITE, LEUKOCYTESUR, UROBILINOGEN, HYALINECASTS in the last 168 hours.    Cultures:    Microbiology Results (last 7 days)     ** No results found for the last 168 hours. **          Assessment:   Paolo Brower is a 48 y.o. male who presents with Colovesical fistula.    Active Diagnoses:    Diagnosis Date Noted POA    PRINCIPAL PROBLEM:  Colovesical fistula [N32.1] 08/15/2018 Yes    Encounter for  postoperative care [Z48.89]  Not Applicable      Problems Resolved During this Admission:     VTE Risk Mitigation (From admission, onward)        Ordered     heparin (porcine) injection 5,000 Units  Every 8 hours      09/18/18 1615     IP VTE HIGH RISK PATIENT  Once      09/18/18 1615     Place BRUNO hose  Until discontinued      09/18/18 1615     Place sequential compression device  Until discontinued      09/18/18 1615          Medical Decision Making/Plan:  Doing well the postoperative phase.  DC Joyce this morning.  Patient with spontaneous voiding.  Will await for 4-6 weeks from surgical date for stent removal. Patient may be discharged home today.  Pain medications written.  Follow up with surgery clinic on Thursday.  Discussed with Dr. HOGAN of Internal Medicine    Rene Kinney MD  General Surgery  [facility] - ICU

## 2018-09-24 NOTE — PLAN OF CARE
Problem: Infection, Risk/Actual (Adult)  Goal: Infection Prevention/Resolution  Patient will demonstrate the desired outcomes by discharge/transition of care.   09/24/18 0541   Infection, Risk/Actual (Adult)   Infection Prevention/Resolution making progress toward outcome

## 2018-09-24 NOTE — NURSING
Joyce catheter removed per MD order,  Balloon deflated with a 10 cc syringe.  Urinal provided for patient and patient instructed to notify myself when patient voids, verbalized understanding.  Patient tolerated removal well, will continue to monitor.

## 2018-09-27 ENCOUNTER — OFFICE VISIT (OUTPATIENT)
Dept: SURGERY | Facility: CLINIC | Age: 48
End: 2018-09-27
Payer: COMMERCIAL

## 2018-09-27 VITALS
HEART RATE: 87 BPM | TEMPERATURE: 98 F | BODY MASS INDEX: 34.01 KG/M2 | HEIGHT: 74 IN | OXYGEN SATURATION: 97 % | WEIGHT: 265 LBS | SYSTOLIC BLOOD PRESSURE: 136 MMHG | DIASTOLIC BLOOD PRESSURE: 88 MMHG | RESPIRATION RATE: 16 BRPM

## 2018-09-27 DIAGNOSIS — Z09 POSTOP CHECK: Primary | ICD-10-CM

## 2018-09-27 PROCEDURE — 99024 POSTOP FOLLOW-UP VISIT: CPT | Mod: S$GLB,,, | Performed by: SURGERY

## 2018-09-30 NOTE — PROGRESS NOTES
"General Surgery  [facility]  Follow-up    HPI/Follow-up exam:  Paolo Brower is a 48 y.o. male status post sigmoid colectomy with low rectal anastomosis and bladder repair with omental pedicle flap for colovesicular fistula takedown presents today for follow-up examination.  The patient has done very well since surgery. No issues.  No fevers.  Normal bowel movements.  Regular tolerance of a regular diet.  No new complaints.    PHYSICAL EXAM:  /88   Pulse 87   Temp 98.4 °F (36.9 °C)   Resp 16   Ht 6' 2" (1.88 m)   Wt 120.2 kg (265 lb)   SpO2 97%   BMI 34.02 kg/m²   Gen: Wd Wn male in NAD  Heent: Nc/At, MMM  Cv: RRR  Lung: Non-labored breathing, clear bilaterally  Abd: Soft, non-tender, non-distended surgical site clean dry and intact no signs or symptoms of infection.  Ext: No cyanosis clubbing or edema    Pathology shows no evidence of cancer or malignancy.  Assessment:  Paolo Brower is a 48 y.o. male s/p colectomy with bladder repair for colovesicular fistula    Plan/Medical Decision Making:  Doing well today.  Follow-up in 4 weeks with me.  Follow-up next week for nursing visit for staple removal. Patient will need bilateral double-J stents removed in 4 weeks by Urology        "

## 2018-10-01 ENCOUNTER — TELEPHONE (OUTPATIENT)
Dept: SURGERY | Facility: CLINIC | Age: 48
End: 2018-10-01

## 2018-10-01 NOTE — TELEPHONE ENCOUNTER
----- Message from Cristal Whyte sent at 10/1/2018 10:16 AM CDT -----   Emmy with [employer]  Disability  Claim #[id number]// asking   Questions about   Surgery  And  Type  ,last  Office  Visit and  Notes // please call  For  All  questions [phone]

## 2018-10-03 ENCOUNTER — TELEPHONE (OUTPATIENT)
Dept: SURGERY | Facility: CLINIC | Age: 48
End: 2018-10-03

## 2018-10-03 NOTE — TELEPHONE ENCOUNTER
----- Message from Grace Holden sent at 10/3/2018  9:27 AM CDT -----  Contact: Ginger Miles is requesting a call back at [phone] [facility]#[phone] she needs to know if the patient is still being advised to stay out of work or was he given a return to work date or was he admitted into the hospital.  And she needs what the treatment plan is.  Thank you!

## 2018-10-11 ENCOUNTER — TELEPHONE (OUTPATIENT)
Dept: UROLOGY | Facility: CLINIC | Age: 48
End: 2018-10-11

## 2018-10-11 ENCOUNTER — TELEPHONE (OUTPATIENT)
Dept: SURGERY | Facility: CLINIC | Age: 48
End: 2018-10-11

## 2018-10-11 NOTE — TELEPHONE ENCOUNTER
----- Message from Becky Reece sent at 10/11/2018 11:42 AM CDT -----  Type: Needs Medical Advice    Who Called:  Self   Symptoms (please be specific):  NA How long has patient had these symptoms:   Pharmacy name and phone #:  [facility]   Best Call Back Number: [phone] Information: Patient had stents placed, patient had blood in urine,asking to speak with the nurse. Call was placed to the pod.

## 2018-10-11 NOTE — TELEPHONE ENCOUNTER
----- Message from Cely Dumont sent at 10/11/2018  9:54 AM CDT -----  Contact: patient  Requesting callback from nurse regarding blood in his urine.  Please call [phone].

## 2018-10-11 NOTE — TELEPHONE ENCOUNTER
Pt states he has a small ting of blood in his urine. Informed pt this is normal with stents. If there is a sudden increase in bright red blood please proceed to ED. Pt verbalized understanding.

## 2018-10-11 NOTE — TELEPHONE ENCOUNTER
Spoke to pt and pt stated that he put in a call to his urologist Dr Shaikh. Writer expressed verbal understanding.

## 2018-10-17 DIAGNOSIS — L98.8 FISTULA: ICD-10-CM

## 2018-10-17 DIAGNOSIS — N32.1: Primary | ICD-10-CM

## 2018-10-22 ENCOUNTER — TELEPHONE (OUTPATIENT)
Dept: UROLOGY | Facility: CLINIC | Age: 48
End: 2018-10-22

## 2018-10-22 NOTE — TELEPHONE ENCOUNTER
----- Message from Mahesh Hoffmann sent at 10/22/2018  8:44 AM CDT -----  Contact: pt  The Pt is requesting a call back regarding the stint being removed. Pt states it should be removed on week 5 and he states this is week 5 but have not yet heard about it being removed. Please call Pt to advise.     Call Back#: [phone]  Thanks

## 2018-10-31 ENCOUNTER — HOSPITAL ENCOUNTER (OUTPATIENT)
Facility: HOSPITAL | Age: 48
Discharge: HOME | End: 2018-10-31
Attending: UROLOGY | Admitting: UROLOGY
Payer: COMMERCIAL

## 2018-10-31 ENCOUNTER — ANESTHESIA EVENT (OUTPATIENT)
Dept: SURGERY | Facility: HOSPITAL | Age: 48
End: 2018-10-31
Payer: COMMERCIAL

## 2018-10-31 ENCOUNTER — ANESTHESIA (OUTPATIENT)
Dept: SURGERY | Facility: HOSPITAL | Age: 48
End: 2018-10-31
Payer: COMMERCIAL

## 2018-10-31 VITALS
SYSTOLIC BLOOD PRESSURE: 122 MMHG | DIASTOLIC BLOOD PRESSURE: 91 MMHG | HEIGHT: 74 IN | TEMPERATURE: 98 F | WEIGHT: 250 LBS | BODY MASS INDEX: 32.08 KG/M2 | HEART RATE: 77 BPM | RESPIRATION RATE: 10 BRPM | OXYGEN SATURATION: 98 %

## 2018-10-31 DIAGNOSIS — L98.8 FISTULA: ICD-10-CM

## 2018-10-31 DIAGNOSIS — N32.1: ICD-10-CM

## 2018-10-31 PROCEDURE — 63600175 PHARM REV CODE 636 W HCPCS: Performed by: UROLOGY

## 2018-10-31 PROCEDURE — D9220A PRA ANESTHESIA: Mod: CRNA,,, | Performed by: NURSE ANESTHETIST, CERTIFIED REGISTERED

## 2018-10-31 PROCEDURE — 76000 FLUOROSCOPY <1 HR PHYS/QHP: CPT | Mod: 26,59,, | Performed by: UROLOGY

## 2018-10-31 PROCEDURE — 36000707: Performed by: UROLOGY

## 2018-10-31 PROCEDURE — 25500020 PHARM REV CODE 255: Performed by: UROLOGY

## 2018-10-31 PROCEDURE — 37000008 HC ANESTHESIA 1ST 15 MINUTES: Performed by: UROLOGY

## 2018-10-31 PROCEDURE — 71000015 HC POSTOP RECOV 1ST HR: Performed by: UROLOGY

## 2018-10-31 PROCEDURE — 25000003 PHARM REV CODE 250: Performed by: NURSE ANESTHETIST, CERTIFIED REGISTERED

## 2018-10-31 PROCEDURE — C1758 CATHETER, URETERAL: HCPCS | Performed by: UROLOGY

## 2018-10-31 PROCEDURE — 71000033 HC RECOVERY, INTIAL HOUR: Performed by: UROLOGY

## 2018-10-31 PROCEDURE — D9220A PRA ANESTHESIA: Mod: ANES,,, | Performed by: ANESTHESIOLOGY

## 2018-10-31 PROCEDURE — 36000706: Performed by: UROLOGY

## 2018-10-31 PROCEDURE — 74420 UROGRAPHY RTRGR +-KUB: CPT | Mod: 26,,, | Performed by: UROLOGY

## 2018-10-31 PROCEDURE — 37000009 HC ANESTHESIA EA ADD 15 MINS: Performed by: UROLOGY

## 2018-10-31 PROCEDURE — 52310 CYSTOSCOPY AND TREATMENT: CPT | Mod: ,,, | Performed by: UROLOGY

## 2018-10-31 PROCEDURE — 63600175 PHARM REV CODE 636 W HCPCS: Performed by: NURSE ANESTHETIST, CERTIFIED REGISTERED

## 2018-10-31 RX ORDER — CEFAZOLIN SODIUM 2 G/50ML
2 SOLUTION INTRAVENOUS
Status: COMPLETED | OUTPATIENT
Start: 2018-10-31 | End: 2018-10-31

## 2018-10-31 RX ORDER — PROPOFOL 10 MG/ML
VIAL (ML) INTRAVENOUS
Status: DISCONTINUED | OUTPATIENT
Start: 2018-10-31 | End: 2018-10-31

## 2018-10-31 RX ORDER — SODIUM CHLORIDE, SODIUM LACTATE, POTASSIUM CHLORIDE, CALCIUM CHLORIDE 600; 310; 30; 20 MG/100ML; MG/100ML; MG/100ML; MG/100ML
INJECTION, SOLUTION INTRAVENOUS CONTINUOUS PRN
Status: DISCONTINUED | OUTPATIENT
Start: 2018-10-31 | End: 2018-10-31

## 2018-10-31 RX ORDER — SULFAMETHOXAZOLE AND TRIMETHOPRIM 800; 160 MG/1; MG/1
1 TABLET ORAL 2 TIMES DAILY
Qty: 20 TABLET | Refills: 0 | Status: SHIPPED | OUTPATIENT
Start: 2018-10-31 | End: 2018-11-10

## 2018-10-31 RX ORDER — MIDAZOLAM HYDROCHLORIDE 1 MG/ML
INJECTION, SOLUTION INTRAMUSCULAR; INTRAVENOUS
Status: DISCONTINUED | OUTPATIENT
Start: 2018-10-31 | End: 2018-10-31

## 2018-10-31 RX ADMIN — PROPOFOL 50 MG: 10 INJECTION, EMULSION INTRAVENOUS at 07:10

## 2018-10-31 RX ADMIN — SODIUM CHLORIDE, POTASSIUM CHLORIDE, SODIUM LACTATE AND CALCIUM CHLORIDE: 600; 310; 30; 20 INJECTION, SOLUTION INTRAVENOUS at 07:10

## 2018-10-31 RX ADMIN — PROPOFOL 100 MG: 10 INJECTION, EMULSION INTRAVENOUS at 07:10

## 2018-10-31 RX ADMIN — CEFAZOLIN SODIUM 2 G: 2 SOLUTION INTRAVENOUS at 07:10

## 2018-10-31 RX ADMIN — MIDAZOLAM HYDROCHLORIDE 2 MG: 1 INJECTION, SOLUTION INTRAMUSCULAR; INTRAVENOUS at 07:10

## 2018-10-31 NOTE — PLAN OF CARE
Patient awake, alert, and oriented.  No complaints of pain or discomfort at present time. Bladder soft and nontender; VSS, tolerating fluids without C/O N/V. Stable for transfer to Phase II.

## 2018-10-31 NOTE — TRANSFER OF CARE
"Anesthesia Transfer of Care Note    Patient: Paolo Brower    Procedure(s) Performed: Procedure(s) (LRB):  CYSTOSCOPY, WITH URETERAL STENT REMOVAL (Bilateral)    Patient location: PACU    Anesthesia Type: MAC    Transport from OR: Transported from OR on room air with adequate spontaneous ventilation    Post pain: adequate analgesia    Post assessment: no apparent anesthetic complications and tolerated procedure well    Post vital signs: stable    Level of consciousness: awake, alert and oriented    Nausea/Vomiting: no nausea/vomiting    Complications: none    Transfer of care protocol was followed      Last vitals:   Visit Vitals  /81 (BP Location: Right arm, Patient Position: Lying)   Pulse 80   Temp 36.8 °C (98.2 °F) (Oral)   Resp 15   Ht 6' 2" (1.88 m)   Wt 113.4 kg (250 lb)   SpO2 98%   BMI 32.10 kg/m²     "

## 2018-10-31 NOTE — ANESTHESIA POSTPROCEDURE EVALUATION
"Anesthesia Post Evaluation    Patient: Paolo Brower    Procedure(s) Performed: Procedure(s) (LRB):  CYSTOSCOPY, WITH URETERAL STENT REMOVAL (Bilateral)    Final Anesthesia Type: MAC  Patient location during evaluation: PACU  Patient participation: Yes- Able to Participate  Level of consciousness: awake and alert  Post-procedure vital signs: reviewed and stable  Pain management: adequate  Airway patency: patent  PONV status at discharge: No PONV  Anesthetic complications: no      Cardiovascular status: blood pressure returned to baseline  Respiratory status: unassisted  Hydration status: euvolemic  Follow-up not needed.        Visit Vitals  BP (!) 122/91   Pulse 77   Temp 36.5 °C (97.7 °F) (Oral)   Resp 10   Ht 6' 2" (1.88 m)   Wt 113.4 kg (250 lb)   SpO2 98%   BMI 32.10 kg/m²       Pain/Ortiz Score: Pain Assessment Performed: Yes (10/31/2018  6:52 AM)  Presence of Pain: complains of pain/discomfort (10/31/2018  6:52 AM)  Pain Rating Prior to Med Admin: 1 (10/31/2018  6:52 AM)  Ortiz Score: 10 (10/31/2018  8:45 AM)        "

## 2018-10-31 NOTE — ANESTHESIA PREPROCEDURE EVALUATION
10/31/2018  Paolo Brower is a 48 y.o., male.    Pre-op Assessment    I have reviewed the Patient Summary Reports.    I have reviewed the Nursing Notes.   I have reviewed the Medications.     Review of Systems  Anesthesia Hx:  No problems with previous Anesthesia  Neg history of prior surgery. Denies Family Hx of Anesthesia complications.   Denies Personal Hx of Anesthesia complications.   Social:  Former Smoker    Hematology/Oncology:  Hematology Normal   Oncology Normal     EENT/Dental:EENT/Dental Normal   Cardiovascular:  Cardiovascular Normal     Pulmonary:  Pulmonary Normal    Renal/:  Renal/ Normal     Hepatic/GI:  Hepatic/GI Normal    Musculoskeletal:  Musculoskeletal Normal    Neurological:  Neurology Normal    Endocrine:  Endocrine Normal    Dermatological:  Skin Normal    Psych:  Psychiatric Normal           Physical Exam  General:  Well nourished    Airway/Jaw/Neck:  AIRWAY FINDINGS: Normal      Eyes/Ears/Nose:  EYES/EARS/NOSE FINDINGS: Normal   Dental:  DENTAL FINDINGS: Normal   Chest/Lungs:  Chest/Lungs Clear    Heart/Vascular:  Heart Findings: Normal Heart murmur: negative Vascular Findings: Normal    Abdomen:  Abdomen Findings: Normal    Musculoskeletal:  Musculoskeletal Findings: Normal   Skin:  Skin Findings: Normal         Anesthesia Plan  Type of Anesthesia, risks & benefits discussed:  Anesthesia Type:  MAC  Patient's Preference:   Intra-op Monitoring Plan: standard ASA monitors  Intra-op Monitoring Plan Comments:   Post Op Pain Control Plan:   Post Op Pain Control Plan Comments:   Induction:   IV  Beta Blocker:  Patient is not currently on a Beta-Blocker (No further documentation required).       Informed Consent: Patient understands risks and agrees with Anesthesia plan.  Questions answered. Anesthesia consent signed with patient.  ASA Score: 1     Day of Surgery Review of History  & Physical:    H&P update referred to the provider.

## 2018-10-31 NOTE — BRIEF OP NOTE
Brief Operative Note     Surgery Date: 10/31/2018    Surgeon:   Charli Shaikh MD     Pre-op Diagnosis:  Fistula, intestinovesical [N32.1]  Fistula [L98.8]    Post-op Diagnosis:  Same    Procedure:  Procedure(s) (LRB):  CYSTOSCOPY, WITH URETERAL STENT REMOVAL (Bilateral)  Miky. Retrogrades  Anesthesia: Local MAC    Findings/Key Components:  Evidence of inflamed left ureter with no obstruction    Estimated Blood Loss: Minimal                                                                                                                           Discharge Summary    Admit Date: 10/31/2018     Attending Physician: Charli Shaikh MD     Discharge Physician: Charli Shaikh MD      Discharge Date: 10/31/2018    Treatments/Procedures: Procedure(s) (LRB):  CYSTOSCOPY, WITH URETERAL STENT REMOVAL (Bilateral)  Final Diagnosis:Hx of colovesical fistulae  Hospital Course: Cystoscopy, Miky. Stent removal, Miky. Retrogrades  F. U. Dr. Shaikh 2 weeks    Disposition: Home or Self Care     Patient Instructions:     Current Discharge Medication List:         Paolo Brower   Home Medication Instructions TEODORO:[id number]    Printed on:10/31/18 0806   Medication Information                      sulfamethoxazole-trimethoprim 800-160mg (BACTRIM DS) 800-160 mg Tab  Take 1 tablet by mouth 2 (two) times daily. for 20 doses                     Current Discharge Medication List      START taking these medications    Details   sulfamethoxazole-trimethoprim 800-160mg (BACTRIM DS) 800-160 mg Tab Take 1 tablet by mouth 2 (two) times daily. for 20 doses  Qty: 20 tablet, Refills: 0             Discharge Procedure Orders:    Discharge Procedure Orders   Diet Adult Regular     Activity as tolerated

## 2018-10-31 NOTE — OP NOTE
CYSTOSCOPY BILATERAL STENT REMOVAL AND BILATERAL RETROGRADES REPORT    Surgery Date:  10/31/2018  Surgeon(s) and Role:     * Charli Shaikh MD - Primary      Paolo Broewr  : 1970    Pre Procedure Diagnosis:Hx of colo-vesical fistulae    OPERATION: CYSTOSCOPY , oscar. Stent removal, oscar. retrogrades    ANESTHESIA: 10 cc 2% lidocaine jelly applied per urethra.MAC    PROCEDURE:  The patient was taken to the cystoscopy suite and placed in supine position.  The genitalia was prepped and draped  in the usual sterile fashion.  Two percent lidocaine jelly was inserted in the urethra and held in place.  After sufficent time had passed to allow good local anesthesia, the cystoscope was inserted in the urethra and passed into the bladder visualizing the urethra along its entire course.  The dome, anterior, posterior and lateral walls were examined systematically.  The ureteral orifices were in their usual position and configuration.  The cystoscope was then brought to the level of the bladder neck, the water was turned on and the prostate was visualized.  The cystoscope was removed.  SPECIMEN:none    FINDINGS:  URETHRA: open with no strictures  PROSTATE: 4 cm with no FELICIANO   TRABEC: grade 2  BLADDER: no lesions or stones. Inflamed trigono due to the stents. No Fistulae seen.  URETERAL ORIFICES : Both with stents in proper position. Stents grasped and removed with no difficulty. A cone tip catheter was used to inject dye in both ureters under fluoroscopy. The Right ureter in NSSP no hydronephrosis or fistulae. The Left ureter in normal position, signs of inflammation in the lower section with no fistulae or obstruction.  OTHER FINDINGS: none    Procedure medication: Lidocaine gel in the urethra  Post Procedure Diagnosis: Resolved colo-vesical fistulae.     ASSESSMENT/PLAN:  Status post  cystoscopy.  1. Push fluids for 24 hours.  2. May see blood in the urine, this should gradually improve over the next 2-3 days.  3.  The patient was instructed to return to the office or go to the emergency should fever, chills, cloudy urine, or inability to urinate develop.  4.  PLAN: Bactrim DS PO BID x 1 week  5. Follow up in RTC in 2 weeks

## 2018-10-31 NOTE — H&P
CHIEF COMPLAINT:  Colovesical fistula.     HISTORY OF PRESENT ILLNESS:  Mr. Brower is a 48-year-old male who had 2  episodes of diverticulitis during 2018.  The patient is being evaluated by  Dr. Kinney and found to have a colovesical fistula.  Despite that, the  patient denies any pneumaturia or evidence of urinary tract infections.   There is imaging documentation of the fistula.  Dr. Kinney is planning  to perform a partial colectomy on 09/18/2018 and is requesting for me to  place ureteral catheters before that procedure.  Since I am going to be out  of town.  During that time, I suggested to the patient that we place a  double-J ureteral stent under general anesthesia on the 12th.  In that way,  Dr. Kinney will have his stent placed at the time of the colectomy and  he agreed for it.  The rationale, the risk and possible complications of  cystoscopy with bilateral stent placement has been fully discussed with the  patient including the sensation of the stent after the placement with  dysuria and recurrent hematuria.  It is to be noted that the patient had a  negative previous genitourinary history.  The remaining of the medical and  surgical history are well documented in the medical record and all these  were reviewed by me during this visit.  This patient is healthy with no  significant previous history.     FAMILY HISTORY:  Also negative for  disease.           Review of Systems   Constitutional: Negative for activity change and appetite change.   HENT: Negative.    Eyes: Negative for discharge.   Respiratory: Negative for cough and shortness of breath.    Cardiovascular: Negative for chest pain and palpitations.   Gastrointestinal: Positive for abdominal pain. Negative for abdominal distention, constipation and vomiting.   Genitourinary: Negative for discharge, dysuria, flank pain, frequency, hematuria, testicular pain and urgency.   Musculoskeletal: Negative for arthralgias.   Skin: Negative  for rash.   Neurological: Negative for dizziness.   Psychiatric/Behavioral: The patient is not nervous/anxious.        Objective:   Physical Exam   Constitutional: He appears well-developed and well-nourished.   HENT:   Head: Normocephalic.   Eyes: Pupils are equal, round, and reactive to light.   Neck: Normal range of motion.   Cardiovascular: Normal rate.    Pulmonary/Chest: Effort normal.   Abdominal: Soft. He exhibits no distension and no mass. There is tenderness.   Genitourinary: Rectum normal, prostate normal and penis normal. Rectal exam shows no external hemorrhoid, no mass and no tenderness. Prostate is not enlarged and not tender. Right testis shows no mass, no swelling and no tenderness. Left testis shows tenderness. Left testis shows no mass and no swelling.         Musculoskeletal: Normal range of motion.   Neurological: He is alert.   Skin: Skin is warm.     Psychiatric: He has a normal mood and affect.       Assessment:       1. Diverticulitis        Plan:       Cysto, oscar. Retrogrades and stent removal.

## 2018-11-01 ENCOUNTER — OFFICE VISIT (OUTPATIENT)
Dept: SURGERY | Facility: CLINIC | Age: 48
End: 2018-11-01
Payer: COMMERCIAL

## 2018-11-01 VITALS
HEART RATE: 81 BPM | HEIGHT: 74 IN | WEIGHT: 252 LBS | TEMPERATURE: 97 F | SYSTOLIC BLOOD PRESSURE: 137 MMHG | DIASTOLIC BLOOD PRESSURE: 89 MMHG | OXYGEN SATURATION: 96 % | BODY MASS INDEX: 32.34 KG/M2

## 2018-11-01 DIAGNOSIS — Z09 POSTOP CHECK: Primary | ICD-10-CM

## 2018-11-01 PROCEDURE — 99024 POSTOP FOLLOW-UP VISIT: CPT | Mod: S$GLB,,, | Performed by: SURGERY

## 2018-11-01 NOTE — LETTER
November 1, 2018      [facility] - General Surgery  [address] [place] [address]  Phone: [phone]  Fax: [phone]       Patient: Paolo Brower   YOB: 1970  Date of Visit: 11/01/2018    To Nitesh Pizano    Krystyna Brower  was at [facility] on 11/01/2018. He may return to work  on 11/19/2018 with no restrictions. If you have any questions or concerns, or if I can be of further assistance, please do not hesitate to contact me.    Sincerely,    Sandrine Onofre LPN

## 2018-11-05 NOTE — PROGRESS NOTES
"General Surgery  [facility]  Follow-up    HPI/Follow-up exam:  Paolo Brower is a 48 y.o. male status post low anterior resection for significant diverticular disease presents today for follow-up examination.  Afebrile.  Vital signs stable.  Ureteral stents removed a few days ago.  Patient complains of some left lower quadrant pain at times.  Started after a significant sneezing episode 2 weeks ago.  No other new complaints.  Normal bowel movements.  No nausea vomiting.    PHYSICAL EXAM:  /89   Pulse 81   Temp 97.3 °F (36.3 °C) (Oral)   Ht 6' 2" (1.88 m)   Wt 114.3 kg (252 lb)   SpO2 96%   BMI 32.35 kg/m²   Gen: Wd Wn male in NAD  Heent: Nc/At, MMM  Cv: RRR  Lung: Non-labored breathing, clear bilaterally  Abd: Soft, non-tender, non-distended, surgical sites clean dry intact no signs or symptoms of infection.  Ext: No cyanosis clubbing or edema    Assessment:  Paolo Brower is a 48 y.o. male s/p low anterior resection for significant diverticular disease.    Plan/Medical Decision Making:  Patient doing well.  Likely is left lower quadrant pain is from irritation of ureteral stents.  Patient instructed to follow up with surgery as needed.  Continue to monitor this pain.  He is afebrile.  Having normal bowel movements.  No nausea or vomiting.          "

## 2020-06-25 ENCOUNTER — PATIENT OUTREACH (OUTPATIENT)
Dept: ADMINISTRATIVE | Facility: HOSPITAL | Age: 50
End: 2020-06-25

## 2020-06-25 NOTE — LETTER
June 25, 2020    Paolo Brower  [address]  [place] [address]             [facility]  [address] [address]  Phone: [phone] Dear [facility] is committed to your overall health and would like to ensure that you are up to date on your recommended test and/or procedures.   Tyrone Issa MD  has found that your chart shows you may be due for the following:    Health Maintenance Due   Topic Date Due    Lipid Panel  1970    HIV Screening  07/31/1986    TETANUS VACCINE  07/31/1989     If you have had any of the above done at another facility, please let us know so that we may obtain copies from that facility.  If you have a copy of these records, please provide a copy for us to scan into your chart.  You are welcome to request that the report be faxed to us at  ([phone]).     Otherwise, please schedule these appointments at your earliest convenience by calling [phone] or going to [email or web address].    If you have an upcoming scheduled appointment for the item above, please disregard this letter.    Sincerely,  Your [facility]  MD Haley Boyd L.P.N. Clinical Care Coordinator  [address] [place] [address], [place] [address] [phone]

## 2020-07-09 ENCOUNTER — OFFICE VISIT (OUTPATIENT)
Dept: FAMILY MEDICINE | Facility: CLINIC | Age: 50
End: 2020-07-09
Payer: COMMERCIAL

## 2020-07-09 VITALS
HEIGHT: 74 IN | TEMPERATURE: 98 F | RESPIRATION RATE: 18 BRPM | SYSTOLIC BLOOD PRESSURE: 144 MMHG | OXYGEN SATURATION: 97 % | DIASTOLIC BLOOD PRESSURE: 94 MMHG | WEIGHT: 275 LBS | HEART RATE: 82 BPM | BODY MASS INDEX: 35.29 KG/M2

## 2020-07-09 DIAGNOSIS — Z00.00 ROUTINE HEALTH MAINTENANCE: Primary | ICD-10-CM

## 2020-07-09 DIAGNOSIS — R22.1 LOCALIZED SWELLING, MASS AND LUMP, NECK: ICD-10-CM

## 2020-07-09 DIAGNOSIS — Z12.5 ENCOUNTER FOR SCREENING FOR MALIGNANT NEOPLASM OF PROSTATE: ICD-10-CM

## 2020-07-09 DIAGNOSIS — Z23 NEED FOR TETANUS BOOSTER: ICD-10-CM

## 2020-07-09 DIAGNOSIS — Z00.00 ANNUAL PHYSICAL EXAM: ICD-10-CM

## 2020-07-09 DIAGNOSIS — Z11.4 SCREENING FOR HIV (HUMAN IMMUNODEFICIENCY VIRUS): ICD-10-CM

## 2020-07-09 DIAGNOSIS — I10 ESSENTIAL HYPERTENSION: ICD-10-CM

## 2020-07-09 PROCEDURE — 90714 TD VACC NO PRESV 7 YRS+ IM: CPT | Mod: S$GLB,,, | Performed by: FAMILY MEDICINE

## 2020-07-09 PROCEDURE — 99386 PREV VISIT NEW AGE 40-64: CPT | Mod: 25,S$GLB,, | Performed by: FAMILY MEDICINE

## 2020-07-09 PROCEDURE — 90471 TD VACCINE GREATER THAN OR EQUAL TO 7YO WITH PRESERVATIVE IM: ICD-10-PCS | Mod: S$GLB,,, | Performed by: FAMILY MEDICINE

## 2020-07-09 PROCEDURE — 99386 PR PREVENTIVE VISIT,NEW,40-64: ICD-10-PCS | Mod: 25,S$GLB,, | Performed by: FAMILY MEDICINE

## 2020-07-09 PROCEDURE — 90714 TD VACCINE GREATER THAN OR EQUAL TO 7YO WITH PRESERVATIVE IM: ICD-10-PCS | Mod: S$GLB,,, | Performed by: FAMILY MEDICINE

## 2020-07-09 PROCEDURE — 99999 PR PBB SHADOW E&M-EST. PATIENT-LVL III: CPT | Mod: PBBFAC,,, | Performed by: FAMILY MEDICINE

## 2020-07-09 PROCEDURE — 90471 IMMUNIZATION ADMIN: CPT | Mod: S$GLB,,, | Performed by: FAMILY MEDICINE

## 2020-07-09 PROCEDURE — 99999 PR PBB SHADOW E&M-EST. PATIENT-LVL III: ICD-10-PCS | Mod: PBBFAC,,, | Performed by: FAMILY MEDICINE

## 2020-07-09 RX ORDER — LOSARTAN POTASSIUM 50 MG/1
50 TABLET ORAL DAILY
Qty: 90 TABLET | Refills: 0 | Status: SHIPPED | OUTPATIENT
Start: 2020-07-09 | End: 2020-10-09 | Stop reason: SDUPTHER

## 2020-07-09 NOTE — PROGRESS NOTES
"Subjective:       Patient ID: Paolo Brower is a 49 y.o. male.  Chief Complaint: Annual Exam    HPI   Mr. Brower presents for annual exam. Would like to update his recommended screenings. Has elevated blood pressure today. Admits to a family history of hypertension and states his blood pressure has been "borderline high" for the past few years.     Swelling on the left side of his neck, present for several years. No associated pain or dysphagia.    Review of Systems   Constitutional: Negative for chills, diaphoresis, fatigue and fever.   HENT: Negative for congestion, hearing loss, mouth sores and nosebleeds.    Respiratory: Negative for cough, shortness of breath, wheezing and stridor.    Cardiovascular: Negative for chest pain, palpitations and leg swelling.   Skin: Negative for color change, pallor, rash and wound.       Past Medical History:   Diagnosis Date    Diverticular stricture     Diverticulitis     Known health problems: none      Past Surgical History:   Procedure Laterality Date    APPENDECTOMY      BLADDER REPAIR  9/18/2018    Procedure: REPAIR, BLADDER;  Surgeon: Rene Kinney MD;  Location: [facility] OR;  Service: General;;  COMPLEX     COLECTOMY N/A 9/18/2018    Procedure: COLECTOMY;  Surgeon: Rene Kinney MD;  Location: [facility] OR;  Service: General;  Laterality: N/A;    COLONOSCOPIC SURGICAL PROCEDURE  9/18/2018    Procedure: SURGICAL PROCEDURE, COLONOSCOPIC;  Surgeon: Rene Kinney MD;  Location: [facility] OR;  Service: General;;    COLONOSCOPY  03/26/2018    COLONOSCOPY N/A 9/17/2018    Procedure: COLONOSCOPY;  Surgeon: Rene Kinney MD;  Location: [facility] ENDO;  Service: General;  Laterality: N/A;  WITH POLYPECTOMY AND TATTOOING    CYSTOSCOPY W/ RETROGRADES Bilateral 10/31/2018    Procedure: CYSTOSCOPY, WITH RETROGRADE PYELOGRAM;  Surgeon: Charli Shakih MD;  Location: [facility] OR;  Service: Urology;  Laterality: Bilateral;    CYSTOSCOPY W/ URETERAL STENT " PLACEMENT Bilateral 2018    Procedure: CYSTOSCOPY, WITH URETERAL STENT INSERTION;  Surgeon: Charli Shaikh MD;  Location: [facility] OR;  Service: Urology;  Laterality: Bilateral;    CYSTOSCOPY W/ URETERAL STENT REMOVAL Bilateral 10/31/2018    Procedure: CYSTOSCOPY, WITH URETERAL STENT REMOVAL;  Surgeon: Charli Shaikh MD;  Location: [facility] OR;  Service: Urology;  Laterality: Bilateral;    LOW ANTERIOR RESECTION OF COLON  2018    Procedure: RESECTION, COLON, LOW ANTERIOR;  Surgeon: Rene Kinney MD;  Location: [facility] OR;  Service: General;;    TONSILLECTOMY       Social History     Socioeconomic History    Marital status:      Spouse name: Not on file    Number of children: Not on file    Years of education: Not on file    Highest education level: Not on file   Occupational History    Not on file   Social Needs    Financial resource strain: Not on file    Food insecurity     Worry: Not on file     Inability: Not on file    Transportation needs     Medical: Not on file     Non-medical: Not on file   Tobacco Use    Smoking status: Current Some Day Smoker     Types: Cigarettes     Last attempt to quit: 2018     Years since quittin.9    Smokeless tobacco: Never Used   Substance and Sexual Activity    Alcohol use: Yes     Comment: rarely    Drug use: No    Sexual activity: Yes     Partners: Female   Lifestyle    Physical activity     Days per week: Not on file     Minutes per session: Not on file    Stress: Not on file   Relationships    Social connections     Talks on phone: Not on file     Gets together: Not on file     Attends Gnosticist service: Not on file     Active member of club or organization: Not on file     Attends meetings of clubs or organizations: Not on file     Relationship status: Not on file   Other Topics Concern    Not on file   Social History Narrative    Not on file     Family History   Problem Relation Age of Onset    Diabetes Mother      "Hypertension Mother     Cancer Father        Objective:      BP (!) 144/94   Pulse 82   Temp 98 °F (36.7 °C) (Temporal)   Resp 18   Ht 6' 2" (1.88 m)   Wt 124.7 kg (275 lb)   SpO2 97%   BMI 35.31 kg/m²   Physical Exam  Vitals signs reviewed.   Constitutional:       General: He is not in acute distress.     Appearance: He is well-developed. He is obese. He is not diaphoretic.   Neck:      Musculoskeletal: No neck rigidity or muscular tenderness.      Comments: Left neck swelling  Cardiovascular:      Rate and Rhythm: Normal rate and regular rhythm.      Heart sounds: Normal heart sounds.   Pulmonary:      Effort: Pulmonary effort is normal. No respiratory distress.      Breath sounds: Normal breath sounds. No wheezing.   Skin:     General: Skin is warm and dry.      Findings: No rash.   Neurological:      Mental Status: He is alert.         Assessment:       1. Routine health maintenance    2. Screening for HIV (human immunodeficiency virus)    3. Annual physical exam    4. Need for tetanus booster    5. Encounter for screening for malignant neoplasm of prostate    6. Essential hypertension    7. Localized swelling, mass and lump, neck        Plan:       See health screenings below. F/u results of neck ultrasound.    Routine health maintenance  -     Lipid Panel; Future; Expected date: 12/25/2020    Screening for HIV (human immunodeficiency virus)  -     HIV 1/2 Ag/Ab (4th Gen); Future; Expected date: 12/25/2020    Annual physical exam    Need for tetanus booster  -     (In Office Administered) Td Vaccine    Encounter for screening for malignant neoplasm of prostate  -     PSA, Screening; Future; Expected date: 07/09/2020    Essential hypertension  -     losartan (COZAAR) 50 MG tablet; Take 1 tablet (50 mg total) by mouth once daily.  Dispense: 90 tablet; Refill: 0    Localized swelling, mass and lump, neck  -     US Soft Tissue Head Neck Thyroid; Future; Expected date: 07/09/2020            Risks, benefits, " and side effects were discussed with the patient. All questions were answered to the fullest satisfaction of the patient, and pt verbalized understanding and agreement to treatment plan. Pt was to call with any new or worsening symptoms, or present to the ER.

## 2020-07-14 ENCOUNTER — HOSPITAL ENCOUNTER (OUTPATIENT)
Dept: RADIOLOGY | Facility: HOSPITAL | Age: 50
Discharge: HOME | End: 2020-07-14
Attending: FAMILY MEDICINE
Payer: COMMERCIAL

## 2020-07-14 DIAGNOSIS — R22.1 LOCALIZED SWELLING, MASS AND LUMP, NECK: ICD-10-CM

## 2020-07-14 PROCEDURE — 76536 US EXAM OF HEAD AND NECK: CPT | Mod: 26,,, | Performed by: RADIOLOGY

## 2020-07-14 PROCEDURE — 76536 US SOFT TISSUE HEAD NECK THYROID: ICD-10-PCS | Mod: 26,,, | Performed by: RADIOLOGY

## 2020-07-14 PROCEDURE — 76536 US EXAM OF HEAD AND NECK: CPT | Mod: TC,PN

## 2020-07-17 DIAGNOSIS — E01.0 THYROMEGALY: Primary | ICD-10-CM

## 2020-07-17 NOTE — PROGRESS NOTES
Please let Mr. Boone know that his bloodwork looked good other than elevated triglycerides. These can be lowered with reducing intake of sugar and fatty foods. His ultrasound did show an enlarged thyroid gland, so I'm going to order additional labs to check his thyroid function. Whenever he gets those done, I'd like him to drop in for a nurse visit for a bp check. Thank you!

## 2020-07-20 ENCOUNTER — LAB VISIT (OUTPATIENT)
Dept: LAB | Facility: HOSPITAL | Age: 50
End: 2020-07-20
Attending: FAMILY MEDICINE
Payer: COMMERCIAL

## 2020-07-20 DIAGNOSIS — E01.0 THYROMEGALY: ICD-10-CM

## 2020-07-20 LAB
T4 FREE SERPL-MCNC: 0.93 NG/DL (ref 0.71–1.51)
TSH SERPL DL<=0.005 MIU/L-ACNC: 2.43 UIU/ML (ref 0.34–5.6)

## 2020-07-20 PROCEDURE — 84480 ASSAY TRIIODOTHYRONINE (T3): CPT

## 2020-07-20 PROCEDURE — 86376 MICROSOMAL ANTIBODY EACH: CPT

## 2020-07-20 PROCEDURE — 36415 COLL VENOUS BLD VENIPUNCTURE: CPT

## 2020-07-20 PROCEDURE — 84443 ASSAY THYROID STIM HORMONE: CPT

## 2020-07-20 PROCEDURE — 84439 ASSAY OF FREE THYROXINE: CPT

## 2020-07-21 LAB
T3 SERPL-MCNC: 117 NG/DL (ref 60–180)
THYROPEROXIDASE IGG SERPL-ACNC: <6 IU/ML

## 2020-07-22 NOTE — PROGRESS NOTES
Please let Mr. Brower know that his thyroid labs are all normal. Because of his slightly enlarged thyroid, however, we may want to repeat the labs and ultrasound in 1 year. Thanks

## 2020-10-09 DIAGNOSIS — I10 ESSENTIAL HYPERTENSION: ICD-10-CM

## 2020-10-09 RX ORDER — LOSARTAN POTASSIUM 50 MG/1
50 TABLET ORAL DAILY
Qty: 90 TABLET | Refills: 3 | Status: SHIPPED | OUTPATIENT
Start: 2020-10-09 | End: 2021-10-21

## 2020-10-09 NOTE — TELEPHONE ENCOUNTER
----- Message from Kayla Thompson sent at 10/9/2020  9:57 AM CDT -----  Type:  RX Refill Request    Who Called:  pt  Refill or New Rx:  refill  RX Name and Strength:  losartan (COZAAR) 50 MG tablet    Preferred Pharmacy with phone number:    [facility] [address] [address]  Phone: [phone] Fax: [phone]  Local or Mail Order:  local  Ordering Provider:  Alfonso Germain Call Back Number:  [phone]  Additional Information:

## 2020-10-12 DIAGNOSIS — I10 ESSENTIAL HYPERTENSION: ICD-10-CM

## 2021-05-07 PROBLEM — I10 ESSENTIAL HYPERTENSION: Status: ACTIVE | Noted: 2021-05-07

## 2021-05-08 ENCOUNTER — PATIENT MESSAGE (OUTPATIENT)
Dept: ADMINISTRATIVE | Facility: OTHER | Age: 51
End: 2021-05-08

## 2021-05-13 DIAGNOSIS — Z11.59 NEED FOR HEPATITIS C SCREENING TEST: ICD-10-CM

## 2021-08-03 ENCOUNTER — PATIENT MESSAGE (OUTPATIENT)
Dept: ADMINISTRATIVE | Facility: OTHER | Age: 51
End: 2021-08-03

## 2021-09-22 ENCOUNTER — PATIENT MESSAGE (OUTPATIENT)
Dept: ADMINISTRATIVE | Facility: OTHER | Age: 51
End: 2021-09-22

## 2021-10-21 ENCOUNTER — PATIENT MESSAGE (OUTPATIENT)
Dept: FAMILY MEDICINE | Facility: CLINIC | Age: 51
End: 2021-10-21
Payer: COMMERCIAL

## 2022-01-11 ENCOUNTER — PATIENT MESSAGE (OUTPATIENT)
Dept: ADMINISTRATIVE | Facility: HOSPITAL | Age: 52
End: 2022-01-11
Payer: COMMERCIAL

## 2022-01-13 NOTE — ANESTHESIA POSTPROCEDURE EVALUATION
Anesthesia Post Evaluation    Patient: Paolo Brower    Procedure(s) Performed: Procedure(s) (LRB):  COLONOSCOPY (N/A)    Final Anesthesia Type: general  Patient location during evaluation: PACU  Patient participation: Yes- Able to Participate  Level of consciousness: awake and awake and alert  Post-procedure vital signs: reviewed and stable  Pain management: adequate  Airway patency: patent  PONV status at discharge: No PONV  Anesthetic complications: no      Cardiovascular status: blood pressure returned to baseline  Respiratory status: unassisted and spontaneous ventilation  Hydration status: euvolemic  Follow-up not needed.        Visit Vitals  /74   Pulse 74   Temp 36.7 °C (98 °F)   Resp 18   Wt 114.8 kg (253 lb)   SpO2 98%   BMI 32.48 kg/m²       Pain/Ortiz Score: Pain Assessment Performed: Yes (9/17/2018  9:28 AM)  Presence of Pain: denies (9/17/2018  9:28 AM)  Ortiz Score: 10 (9/17/2018  9:27 AM)        
Not applicable

## 2022-01-21 DIAGNOSIS — I10 ESSENTIAL HYPERTENSION: ICD-10-CM

## 2022-01-21 NOTE — TELEPHONE ENCOUNTER
----- Message from Macie Diaz sent at 1/21/2022  3:31 PM CST -----  Type:  RX Refill Request    Who Called:  Pt   Refill or New Rx:  Refill  RX Name and Strength:  losartan (COZAAR) 50 MG tablet      How is the patient currently taking it? (ex. 1XDay):  1  Is this a 30 day or 90 day RX:  90  Preferred Pharmacy with phone number:  [address], [place] - [address] [address];  Local or Mail Order:  Local  Ordering Provider:  Dr. Alfonso Germain Call Back Number:  [phone]  Additional Information:  Please call and advise

## 2022-01-24 RX ORDER — LOSARTAN POTASSIUM 50 MG/1
50 TABLET ORAL DAILY
Qty: 90 TABLET | Refills: 0 | Status: SHIPPED | OUTPATIENT
Start: 2022-01-24 | End: 2022-02-08 | Stop reason: SDUPTHER

## 2022-02-08 ENCOUNTER — OFFICE VISIT (OUTPATIENT)
Dept: FAMILY MEDICINE | Facility: CLINIC | Age: 52
End: 2022-02-08
Payer: COMMERCIAL

## 2022-02-08 VITALS
RESPIRATION RATE: 20 BRPM | HEART RATE: 82 BPM | DIASTOLIC BLOOD PRESSURE: 80 MMHG | BODY MASS INDEX: 33.65 KG/M2 | SYSTOLIC BLOOD PRESSURE: 125 MMHG | WEIGHT: 262.13 LBS | OXYGEN SATURATION: 97 %

## 2022-02-08 DIAGNOSIS — Z12.5 ENCOUNTER FOR SCREENING FOR MALIGNANT NEOPLASM OF PROSTATE: Primary | ICD-10-CM

## 2022-02-08 DIAGNOSIS — F17.210 CIGARETTE SMOKER MOTIVATED TO QUIT: ICD-10-CM

## 2022-02-08 DIAGNOSIS — Z11.59 NEED FOR HEPATITIS C SCREENING TEST: ICD-10-CM

## 2022-02-08 DIAGNOSIS — I10 ESSENTIAL HYPERTENSION: ICD-10-CM

## 2022-02-08 PROCEDURE — 99999 PR PBB SHADOW E&M-EST. PATIENT-LVL III: ICD-10-PCS | Mod: PBBFAC,,, | Performed by: FAMILY MEDICINE

## 2022-02-08 PROCEDURE — 3074F SYST BP LT 130 MM HG: CPT | Mod: S$GLB,,, | Performed by: FAMILY MEDICINE

## 2022-02-08 PROCEDURE — 4010F PR ACE/ARB THEARPY RXD/TAKEN: ICD-10-PCS | Mod: S$GLB,,, | Performed by: FAMILY MEDICINE

## 2022-02-08 PROCEDURE — 3079F DIAST BP 80-89 MM HG: CPT | Mod: S$GLB,,, | Performed by: FAMILY MEDICINE

## 2022-02-08 PROCEDURE — 1160F RVW MEDS BY RX/DR IN RCRD: CPT | Mod: S$GLB,,, | Performed by: FAMILY MEDICINE

## 2022-02-08 PROCEDURE — 3074F PR MOST RECENT SYSTOLIC BLOOD PRESSURE < 130 MM HG: ICD-10-PCS | Mod: S$GLB,,, | Performed by: FAMILY MEDICINE

## 2022-02-08 PROCEDURE — 3008F PR BODY MASS INDEX (BMI) DOCUMENTED: ICD-10-PCS | Mod: S$GLB,,, | Performed by: FAMILY MEDICINE

## 2022-02-08 PROCEDURE — 99214 OFFICE O/P EST MOD 30 MIN: CPT | Mod: S$GLB,,, | Performed by: FAMILY MEDICINE

## 2022-02-08 PROCEDURE — 99999 PR PBB SHADOW E&M-EST. PATIENT-LVL III: CPT | Mod: PBBFAC,,, | Performed by: FAMILY MEDICINE

## 2022-02-08 PROCEDURE — 99214 PR OFFICE/OUTPT VISIT, EST, LEVL IV, 30-39 MIN: ICD-10-PCS | Mod: S$GLB,,, | Performed by: FAMILY MEDICINE

## 2022-02-08 PROCEDURE — 1160F PR REVIEW ALL MEDS BY PRESCRIBER/CLIN PHARMACIST DOCUMENTED: ICD-10-PCS | Mod: S$GLB,,, | Performed by: FAMILY MEDICINE

## 2022-02-08 PROCEDURE — 3079F PR MOST RECENT DIASTOLIC BLOOD PRESSURE 80-89 MM HG: ICD-10-PCS | Mod: S$GLB,,, | Performed by: FAMILY MEDICINE

## 2022-02-08 PROCEDURE — 1159F MED LIST DOCD IN RCRD: CPT | Mod: S$GLB,,, | Performed by: FAMILY MEDICINE

## 2022-02-08 PROCEDURE — 4010F ACE/ARB THERAPY RXD/TAKEN: CPT | Mod: S$GLB,,, | Performed by: FAMILY MEDICINE

## 2022-02-08 PROCEDURE — 1159F PR MEDICATION LIST DOCUMENTED IN MEDICAL RECORD: ICD-10-PCS | Mod: S$GLB,,, | Performed by: FAMILY MEDICINE

## 2022-02-08 PROCEDURE — 3008F BODY MASS INDEX DOCD: CPT | Mod: S$GLB,,, | Performed by: FAMILY MEDICINE

## 2022-02-08 RX ORDER — LOSARTAN POTASSIUM 50 MG/1
50 TABLET ORAL DAILY
Qty: 90 TABLET | Refills: 3 | Status: SHIPPED | OUTPATIENT
Start: 2022-02-08 | End: 2022-04-26 | Stop reason: SDUPTHER

## 2022-02-08 NOTE — PROGRESS NOTES
Subjective:       Patient ID: Paolo Brower is a 51 y.o. male.    Chief Complaint: Follow-up and Medication Refill    HPI   Follow-up. Due for refills on his losartan. Blood pressure well-controlled. Trying to lose weight and is down 13 lb from his last appt with me. Last set of labs is from 2020. He has no new complaints at this time but would like to quit smoking.    Review of Systems   Constitutional: Negative for chills and fever.   HENT: Negative for congestion.    Cardiovascular: Negative for chest pain.       Past Medical History:   Diagnosis Date    Diverticular stricture     Diverticulitis     Essential hypertension 5/7/2021    Known health problems: none      Past Surgical History:   Procedure Laterality Date    APPENDECTOMY      BLADDER REPAIR  9/18/2018    Procedure: REPAIR, BLADDER;  Surgeon: Rene Kinney MD;  Location: [facility] OR;  Service: General;;  COMPLEX     COLECTOMY N/A 9/18/2018    Procedure: COLECTOMY;  Surgeon: Rene Kinney MD;  Location: [facility] OR;  Service: General;  Laterality: N/A;    COLONOSCOPIC SURGICAL PROCEDURE  9/18/2018    Procedure: SURGICAL PROCEDURE, COLONOSCOPIC;  Surgeon: Rene Kinney MD;  Location: [facility] OR;  Service: General;;    COLONOSCOPY  03/26/2018    COLONOSCOPY N/A 9/17/2018    Procedure: COLONOSCOPY;  Surgeon: Rene Kinney MD;  Location: [facility];  Service: General;  Laterality: N/A;  WITH POLYPECTOMY AND TATTOOING    CYSTOSCOPY W/ RETROGRADES Bilateral 10/31/2018    Procedure: CYSTOSCOPY, WITH RETROGRADE PYELOGRAM;  Surgeon: Charli Shaikh MD;  Location: [facility] OR;  Service: Urology;  Laterality: Bilateral;    CYSTOSCOPY W/ URETERAL STENT PLACEMENT Bilateral 9/12/2018    Procedure: CYSTOSCOPY, WITH URETERAL STENT INSERTION;  Surgeon: Charli Shaikh MD;  Location: [facility] OR;  Service: Urology;  Laterality: Bilateral;    CYSTOSCOPY W/ URETERAL STENT REMOVAL Bilateral 10/31/2018    Procedure: CYSTOSCOPY, WITH  URETERAL STENT REMOVAL;  Surgeon: Charli Shaikh MD;  Location: [facility] OR;  Service: Urology;  Laterality: Bilateral;    LOW ANTERIOR RESECTION OF COLON  9/18/2018    Procedure: RESECTION, COLON, LOW ANTERIOR;  Surgeon: Rene Kinney MD;  Location: [facility] OR;  Service: General;;    TONSILLECTOMY       Social History     Socioeconomic History    Marital status:    Tobacco Use    Smoking status: Current Some Day Smoker     Types: Cigarettes     Last attempt to quit: 8/7/2018     Years since quitting: 3.5    Smokeless tobacco: Never Used   Substance and Sexual Activity    Alcohol use: Yes     Comment: rarely    Drug use: No    Sexual activity: Yes     Partners: Female     Social Determinants of Health     Financial Resource Strain: Low Risk     Difficulty of Paying Living Expenses: Not hard at all   Food Insecurity: No Food Insecurity    Worried About Running Out of Food in the Last Year: Never true    Ran Out of Food in the Last Year: Never true   Transportation Needs: Unmet Transportation Needs    Lack of Transportation (Non-Medical): Yes   Social Connections: Unknown    Frequency of Communication with Friends and Family: More than three times a week    Frequency of Social Gatherings with Friends and Family: Three times a week     Family History   Problem Relation Age of Onset    Diabetes Mother     Hypertension Mother     Cancer Father        Objective:      /80 (BP Location: Left arm, Patient Position: Sitting, BP Method: Large (Automatic))   Pulse 82   Resp 20   Wt 118.9 kg (262 lb 1.6 oz)   SpO2 97%   BMI 33.65 kg/m²   Physical Exam  Vitals reviewed.   Constitutional:       General: He is not in acute distress.     Appearance: He is not toxic-appearing.   HENT:      Head: Normocephalic and atraumatic.   Eyes:      General: No scleral icterus.     Conjunctiva/sclera: Conjunctivae normal.   Cardiovascular:      Rate and Rhythm: Normal rate and regular rhythm.   Pulmonary:       Effort: Pulmonary effort is normal. No respiratory distress.   Neurological:      Mental Status: He is alert and oriented to person, place, and time.   Psychiatric:         Mood and Affect: Mood normal.         Behavior: Behavior normal.         Thought Content: Thought content normal.         Judgment: Judgment normal.         Assessment:       1. Encounter for screening for malignant neoplasm of prostate    2. Essential hypertension    3. Need for hepatitis C screening test    4. Cigarette smoker motivated to quit        Plan:       Losartan refilled. See lab orders below. Return in 1-2 months for annual physical.    Encounter for screening for malignant neoplasm of prostate  -     PSA, Screening; Future; Expected date: 02/08/2022    Essential hypertension  -     losartan (COZAAR) 50 MG tablet; Take 1 tablet (50 mg total) by mouth once daily.  Dispense: 90 tablet; Refill: 3  -     CBC Auto Differential; Future; Expected date: 02/08/2022  -     Comprehensive Metabolic Panel; Future; Expected date: 02/08/2022  -     TSH; Future; Expected date: 02/08/2022  -     T4, Free; Future; Expected date: 02/08/2022    Need for hepatitis C screening test  -     Hepatitis C Antibody; Future; Expected date: 02/08/2022    Cigarette smoker motivated to quit  -     Ambulatory referral/consult to Smoking Cessation Program; Future; Expected date: 02/15/2022            Risks, benefits, and side effects were discussed with the patient. All questions were answered to the fullest satisfaction of the patient, and pt verbalized understanding and agreement to treatment plan. Pt was to call with any new or worsening symptoms, or present to the ER.

## 2022-02-17 ENCOUNTER — LAB VISIT (OUTPATIENT)
Dept: LAB | Facility: HOSPITAL | Age: 52
End: 2022-02-17
Attending: FAMILY MEDICINE
Payer: COMMERCIAL

## 2022-02-17 DIAGNOSIS — Z01.84 ENCOUNTER FOR ANTIBODY RESPONSE EXAMINATION: ICD-10-CM

## 2022-02-17 DIAGNOSIS — Z12.5 ENCOUNTER FOR SCREENING FOR MALIGNANT NEOPLASM OF PROSTATE: ICD-10-CM

## 2022-02-17 DIAGNOSIS — Z11.59 NEED FOR HEPATITIS C SCREENING TEST: ICD-10-CM

## 2022-02-17 DIAGNOSIS — I10 ESSENTIAL HYPERTENSION: ICD-10-CM

## 2022-02-17 LAB
ALBUMIN SERPL BCP-MCNC: 4.3 G/DL (ref 3.5–5.2)
ALP SERPL-CCNC: 124 U/L (ref 55–135)
ALT SERPL W/O P-5'-P-CCNC: 23 U/L (ref 10–44)
ANION GAP SERPL CALC-SCNC: 11 MMOL/L (ref 8–16)
AST SERPL-CCNC: 12 U/L (ref 10–40)
BASOPHILS # BLD AUTO: 0.12 K/UL (ref 0–0.2)
BASOPHILS NFR BLD: 1.1 % (ref 0–1.9)
BILIRUB SERPL-MCNC: 0.5 MG/DL (ref 0.1–1)
BUN SERPL-MCNC: 17 MG/DL (ref 6–20)
CALCIUM SERPL-MCNC: 9.1 MG/DL (ref 8.7–10.5)
CHLORIDE SERPL-SCNC: 107 MMOL/L (ref 95–110)
CO2 SERPL-SCNC: 20 MMOL/L (ref 23–29)
COMPLEXED PSA SERPL-MCNC: 3.2 NG/ML (ref 0–4)
CREAT SERPL-MCNC: 1.1 MG/DL (ref 0.5–1.4)
DIFFERENTIAL METHOD: ABNORMAL
EOSINOPHIL # BLD AUTO: 0.2 K/UL (ref 0–0.5)
EOSINOPHIL NFR BLD: 1.4 % (ref 0–8)
ERYTHROCYTE [DISTWIDTH] IN BLOOD BY AUTOMATED COUNT: 13.2 % (ref 11.5–14.5)
EST. GFR  (AFRICAN AMERICAN): >60 ML/MIN/1.73 M^2
EST. GFR  (NON AFRICAN AMERICAN): >60 ML/MIN/1.73 M^2
GLUCOSE SERPL-MCNC: 90 MG/DL (ref 70–110)
HCT VFR BLD AUTO: 43 % (ref 40–54)
HGB BLD-MCNC: 15 G/DL (ref 14–18)
IMM GRANULOCYTES # BLD AUTO: 0.06 K/UL (ref 0–0.04)
IMM GRANULOCYTES NFR BLD AUTO: 0.5 % (ref 0–0.5)
LYMPHOCYTES # BLD AUTO: 3.4 K/UL (ref 1–4.8)
LYMPHOCYTES NFR BLD: 30.5 % (ref 18–48)
MCH RBC QN AUTO: 28.7 PG (ref 27–31)
MCHC RBC AUTO-ENTMCNC: 34.9 G/DL (ref 32–36)
MCV RBC AUTO: 82 FL (ref 82–98)
MONOCYTES # BLD AUTO: 0.7 K/UL (ref 0.3–1)
MONOCYTES NFR BLD: 6.6 % (ref 4–15)
NEUTROPHILS # BLD AUTO: 6.7 K/UL (ref 1.8–7.7)
NEUTROPHILS NFR BLD: 59.9 % (ref 38–73)
NRBC BLD-RTO: 0 /100 WBC
PLATELET # BLD AUTO: 317 K/UL (ref 150–450)
PMV BLD AUTO: 9.7 FL (ref 9.2–12.9)
POTASSIUM SERPL-SCNC: 4.2 MMOL/L (ref 3.5–5.1)
PROT SERPL-MCNC: 7.1 G/DL (ref 6–8.4)
RBC # BLD AUTO: 5.22 M/UL (ref 4.6–6.2)
SODIUM SERPL-SCNC: 138 MMOL/L (ref 136–145)
T4 FREE SERPL-MCNC: 0.93 NG/DL (ref 0.71–1.51)
TSH SERPL DL<=0.005 MIU/L-ACNC: 2.94 UIU/ML (ref 0.4–4)
WBC # BLD AUTO: 11.13 K/UL (ref 3.9–12.7)

## 2022-02-17 PROCEDURE — 36415 COLL VENOUS BLD VENIPUNCTURE: CPT | Performed by: FAMILY MEDICINE

## 2022-02-17 PROCEDURE — 86803 HEPATITIS C AB TEST: CPT | Performed by: FAMILY MEDICINE

## 2022-02-17 PROCEDURE — 86769 SARS-COV-2 COVID-19 ANTIBODY: CPT | Performed by: FAMILY MEDICINE

## 2022-02-17 PROCEDURE — 84443 ASSAY THYROID STIM HORMONE: CPT | Performed by: FAMILY MEDICINE

## 2022-02-17 PROCEDURE — 84439 ASSAY OF FREE THYROXINE: CPT | Performed by: FAMILY MEDICINE

## 2022-02-17 PROCEDURE — 85025 COMPLETE CBC W/AUTO DIFF WBC: CPT | Performed by: FAMILY MEDICINE

## 2022-02-17 PROCEDURE — 80053 COMPREHEN METABOLIC PANEL: CPT | Performed by: FAMILY MEDICINE

## 2022-02-17 PROCEDURE — 84153 ASSAY OF PSA TOTAL: CPT | Performed by: FAMILY MEDICINE

## 2022-02-18 LAB
HCV AB SERPL QL IA: NEGATIVE
SARS-COV-2 IGG SERPL IA-ACNC: NORMAL AU/ML
SARS-COV-2 IGG SERPL QL IA: POSITIVE

## 2022-03-07 ENCOUNTER — TELEPHONE (OUTPATIENT)
Dept: SMOKING CESSATION | Facility: CLINIC | Age: 52
End: 2022-03-07
Payer: COMMERCIAL

## 2022-03-07 NOTE — TELEPHONE ENCOUNTER
Call patient to offer to reschedule his intake appointment with smoking cessation. He has been rescheduled for 3/8/22 at 3:00 in [facility].

## 2022-03-08 ENCOUNTER — CLINICAL SUPPORT (OUTPATIENT)
Dept: SMOKING CESSATION | Facility: CLINIC | Age: 52
End: 2022-03-08

## 2022-03-08 DIAGNOSIS — F17.210 MODERATE SMOKER (20 OR LESS PER DAY): Primary | ICD-10-CM

## 2022-03-08 PROCEDURE — 99404 PR PREVENT COUNSEL,INDIV,60 MIN: ICD-10-PCS | Mod: S$GLB,,, | Performed by: INTERNAL MEDICINE

## 2022-03-08 PROCEDURE — 99404 PREV MED CNSL INDIV APPRX 60: CPT | Mod: S$GLB,,, | Performed by: INTERNAL MEDICINE

## 2022-03-08 RX ORDER — VARENICLINE TARTRATE 1 MG/1
1 TABLET, FILM COATED ORAL 2 TIMES DAILY
Qty: 56 TABLET | Refills: 0 | Status: SHIPPED | OUTPATIENT
Start: 2022-03-08 | End: 2022-04-05 | Stop reason: SDUPTHER

## 2022-03-08 NOTE — Clinical Note
Patient seen for the tobacco cessation program. This is his first attempt to stop smoking through our program. He was able to quit in the past for 1 year. He is a cigarette smoker of 1 PPD X 32 years. He is motivated to quit the use of tobacco and has agreed to attend our 6 week tobacco cessation program.

## 2022-03-15 ENCOUNTER — CLINICAL SUPPORT (OUTPATIENT)
Dept: SMOKING CESSATION | Facility: CLINIC | Age: 52
End: 2022-03-15

## 2022-03-15 DIAGNOSIS — F17.210 MODERATE SMOKER (20 OR LESS PER DAY): Primary | ICD-10-CM

## 2022-03-15 PROCEDURE — 99404 PR PREVENT COUNSEL,INDIV,60 MIN: ICD-10-PCS | Mod: S$GLB,,, | Performed by: INTERNAL MEDICINE

## 2022-03-15 PROCEDURE — 99404 PREV MED CNSL INDIV APPRX 60: CPT | Mod: S$GLB,,, | Performed by: INTERNAL MEDICINE

## 2022-03-15 NOTE — Clinical Note
Patient reports decreasing cigarette smoking from 1 pack per day for 32 years to 3/4 of a pack. We discussed and reviewed strategies, cues, and triggers. Introduced the negative impact of tobacco on health, the health advantages of discontinuing the use of tobacco, time line improved health changes after a quit, withdrawal issues to expect from nicotine and habit, and ways to achieve the goal of a quit. The patient remains on the prescribed tobacco cessation medication regimen of 1 mg Chantix BID but has not received it at this time.The patient denies any abnormal behavioral or mental changes at this time. The patient will continue with group therapy sessions and medication monitoring by CTTS. Prescribed medication management will be by physician.

## 2022-03-15 NOTE — PROGRESS NOTES
Individual Follow-Up Form    3/15/2022    Quit Date: none    Clinical Status of Patient: Outpatient    Length of Service: 60 minutes    Continuing Medication: yes  Chantix     Other Medications: none     Target Symptoms: Withdrawal and medication side effects. The following were rated moderate (3) to severe (4) on TCRS:  · Moderate (3): none  · Severe (4): none    Comments: #2 Patient reports decreasing cigarette smoking from 1 pack per day for 32 years to 3/4 of a pack. We discussed and reviewed strategies, cues, and triggers. Introduced the negative impact of tobacco on health, the health advantages of discontinuing the use of tobacco, time line improved health changes after a quit, withdrawal issues to expect from nicotine and habit, and ways to achieve the goal of a quit. The patient remains on the prescribed tobacco cessation medication regimen of 1 mg Chantix BID but has not received it at this time.The patient denies any abnormal behavioral or mental changes at this time. The patient will continue with group therapy sessions and medication monitoring by CTTS. Prescribed medication management will be by physician.       Diagnosis: F17.210    Next Visit: 1 week

## 2022-03-16 ENCOUNTER — OFFICE VISIT (OUTPATIENT)
Dept: FAMILY MEDICINE | Facility: CLINIC | Age: 52
End: 2022-03-16
Payer: COMMERCIAL

## 2022-03-16 VITALS
HEART RATE: 88 BPM | DIASTOLIC BLOOD PRESSURE: 73 MMHG | SYSTOLIC BLOOD PRESSURE: 109 MMHG | BODY MASS INDEX: 33.39 KG/M2 | OXYGEN SATURATION: 97 % | HEIGHT: 74 IN | WEIGHT: 260.19 LBS

## 2022-03-16 DIAGNOSIS — F17.210 CIGARETTE NICOTINE DEPENDENCE WITHOUT COMPLICATION: ICD-10-CM

## 2022-03-16 DIAGNOSIS — R97.20 ELEVATED PSA, LESS THAN 10 NG/ML: ICD-10-CM

## 2022-03-16 DIAGNOSIS — Z00.00 ANNUAL PHYSICAL EXAM: Primary | ICD-10-CM

## 2022-03-16 PROCEDURE — 1159F MED LIST DOCD IN RCRD: CPT | Mod: S$GLB,,, | Performed by: FAMILY MEDICINE

## 2022-03-16 PROCEDURE — 1160F PR REVIEW ALL MEDS BY PRESCRIBER/CLIN PHARMACIST DOCUMENTED: ICD-10-PCS | Mod: S$GLB,,, | Performed by: FAMILY MEDICINE

## 2022-03-16 PROCEDURE — 4010F PR ACE/ARB THEARPY RXD/TAKEN: ICD-10-PCS | Mod: S$GLB,,, | Performed by: FAMILY MEDICINE

## 2022-03-16 PROCEDURE — 3008F PR BODY MASS INDEX (BMI) DOCUMENTED: ICD-10-PCS | Mod: S$GLB,,, | Performed by: FAMILY MEDICINE

## 2022-03-16 PROCEDURE — 3074F PR MOST RECENT SYSTOLIC BLOOD PRESSURE < 130 MM HG: ICD-10-PCS | Mod: S$GLB,,, | Performed by: FAMILY MEDICINE

## 2022-03-16 PROCEDURE — 4010F ACE/ARB THERAPY RXD/TAKEN: CPT | Mod: S$GLB,,, | Performed by: FAMILY MEDICINE

## 2022-03-16 PROCEDURE — 1160F RVW MEDS BY RX/DR IN RCRD: CPT | Mod: S$GLB,,, | Performed by: FAMILY MEDICINE

## 2022-03-16 PROCEDURE — 1159F PR MEDICATION LIST DOCUMENTED IN MEDICAL RECORD: ICD-10-PCS | Mod: S$GLB,,, | Performed by: FAMILY MEDICINE

## 2022-03-16 PROCEDURE — 3008F BODY MASS INDEX DOCD: CPT | Mod: S$GLB,,, | Performed by: FAMILY MEDICINE

## 2022-03-16 PROCEDURE — 3078F PR MOST RECENT DIASTOLIC BLOOD PRESSURE < 80 MM HG: ICD-10-PCS | Mod: S$GLB,,, | Performed by: FAMILY MEDICINE

## 2022-03-16 PROCEDURE — 99396 PREV VISIT EST AGE 40-64: CPT | Mod: S$GLB,,, | Performed by: FAMILY MEDICINE

## 2022-03-16 PROCEDURE — 3074F SYST BP LT 130 MM HG: CPT | Mod: S$GLB,,, | Performed by: FAMILY MEDICINE

## 2022-03-16 PROCEDURE — 99999 PR PBB SHADOW E&M-EST. PATIENT-LVL IV: CPT | Mod: PBBFAC,,, | Performed by: FAMILY MEDICINE

## 2022-03-16 PROCEDURE — 3078F DIAST BP <80 MM HG: CPT | Mod: S$GLB,,, | Performed by: FAMILY MEDICINE

## 2022-03-16 PROCEDURE — 99999 PR PBB SHADOW E&M-EST. PATIENT-LVL IV: ICD-10-PCS | Mod: PBBFAC,,, | Performed by: FAMILY MEDICINE

## 2022-03-16 PROCEDURE — 99396 PR PREVENTIVE VISIT,EST,40-64: ICD-10-PCS | Mod: S$GLB,,, | Performed by: FAMILY MEDICINE

## 2022-03-16 NOTE — PROGRESS NOTES
Subjective:       Patient ID: Paolo Brower is a 51 y.o. male.    Chief Complaint: Annual Exam (Annual physical, lab review. Pt consents to CT lung screening. Pt declines Covid, Flu, Shingles and Pneumo vaccines. )    HPI   Patient presents for annual exam and lab review. Weight and blood pressure stable. Bloodwork within acceptable range, but PSA has increased from last year. Would like to see a urologist due to his family history of prostate cancer. Consents to lung cancer screening. Declines vaccinations at this time.    Review of Systems   Constitutional: Negative for chills and fever.   HENT: Negative for congestion and facial swelling.    Neurological: Negative for seizures.   Psychiatric/Behavioral: Negative for hallucinations.       Past Medical History:   Diagnosis Date    Diverticular stricture     Diverticulitis     Essential hypertension 05/07/2021    Known health problems: none      Past Surgical History:   Procedure Laterality Date    APPENDECTOMY      BLADDER REPAIR  9/18/2018    Procedure: REPAIR, BLADDER;  Surgeon: Rene Kinney MD;  Location: [facility] OR;  Service: General;;  COMPLEX     COLECTOMY N/A 9/18/2018    Procedure: COLECTOMY;  Surgeon: Rene Kinney MD;  Location: [facility] OR;  Service: General;  Laterality: N/A;    COLONOSCOPIC SURGICAL PROCEDURE  9/18/2018    Procedure: SURGICAL PROCEDURE, COLONOSCOPIC;  Surgeon: Rene Kinney MD;  Location: [facility] OR;  Service: General;;    COLONOSCOPY  03/26/2018    COLONOSCOPY N/A 9/17/2018    Procedure: COLONOSCOPY;  Surgeon: Rene Kinney MD;  Location: [facility] ENDO;  Service: General;  Laterality: N/A;  WITH POLYPECTOMY AND TATTOOING    CYSTOSCOPY W/ RETROGRADES Bilateral 10/31/2018    Procedure: CYSTOSCOPY, WITH RETROGRADE PYELOGRAM;  Surgeon: Charli Shaikh MD;  Location: [facility] OR;  Service: Urology;  Laterality: Bilateral;    CYSTOSCOPY W/ URETERAL STENT PLACEMENT Bilateral 9/12/2018    Procedure:  "CYSTOSCOPY, WITH URETERAL STENT INSERTION;  Surgeon: Charli Shaikh MD;  Location: [facility] OR;  Service: Urology;  Laterality: Bilateral;    CYSTOSCOPY W/ URETERAL STENT REMOVAL Bilateral 10/31/2018    Procedure: CYSTOSCOPY, WITH URETERAL STENT REMOVAL;  Surgeon: Charli Shaikh MD;  Location: [facility] OR;  Service: Urology;  Laterality: Bilateral;    LOW ANTERIOR RESECTION OF COLON  9/18/2018    Procedure: RESECTION, COLON, LOW ANTERIOR;  Surgeon: Rene Kinney MD;  Location: [facility];  Service: General;;    TONSILLECTOMY       Social History     Socioeconomic History    Marital status:    Tobacco Use    Smoking status: Current Some Day Smoker     Packs/day: 1.00     Years: 32.00     Pack years: 32.00     Types: Cigarettes     Last attempt to quit: 8/7/2018     Years since quitting: 3.6    Smokeless tobacco: Never Used   Substance and Sexual Activity    Alcohol use: Yes     Comment: rarely    Drug use: No    Sexual activity: Yes     Partners: Female     Social Determinants of Health     Financial Resource Strain: Low Risk     Difficulty of Paying Living Expenses: Not hard at all   Food Insecurity: No Food Insecurity    Worried About Running Out of Food in the Last Year: Never true    Ran Out of Food in the Last Year: Never true   Transportation Needs: Unmet Transportation Needs    Lack of Transportation (Non-Medical): Yes   Social Connections: Unknown    Frequency of Communication with Friends and Family: More than three times a week    Frequency of Social Gatherings with Friends and Family: Three times a week     Family History   Problem Relation Age of Onset    Diabetes Mother     Hypertension Mother     Cancer Father        Objective:      /73 (BP Location: Left arm, Patient Position: Sitting, BP Method: Large (Automatic))   Pulse 88   Ht 6' 2" (1.88 m)   Wt 118 kg (260 lb 3.2 oz)   SpO2 97%   BMI 33.41 kg/m²   Physical Exam  Vitals reviewed.   Constitutional:       " General: He is not in acute distress.     Appearance: He is not toxic-appearing.   Eyes:      General: No scleral icterus.     Conjunctiva/sclera: Conjunctivae normal.   Cardiovascular:      Rate and Rhythm: Normal rate.   Pulmonary:      Effort: Pulmonary effort is normal. No respiratory distress.   Neurological:      Mental Status: He is alert.   Psychiatric:         Mood and Affect: Mood normal.         Behavior: Behavior normal.         Assessment:       1. Annual physical exam    2. Cigarette nicotine dependence without complication    3. Elevated PSA, less than 10 ng/ml        Plan:       See orders below. F/u in 7-9 months.    Annual physical exam  -     CT Chest Lung Screening Low Dose; Future; Expected date: 03/16/2022  -     Ambulatory referral/consult to Urology; Future; Expected date: 03/23/2022    Cigarette nicotine dependence without complication  -     CT Chest Lung Screening Low Dose; Future; Expected date: 03/16/2022    Elevated PSA, less than 10 ng/ml  -     Ambulatory referral/consult to Urology; Future; Expected date: 03/23/2022            Risks, benefits, and side effects were discussed with the patient. All questions were answered to the fullest satisfaction of the patient, and pt verbalized understanding and agreement to treatment plan. Pt was to call with any new or worsening symptoms, or present to the ER.

## 2022-03-21 ENCOUNTER — HOSPITAL ENCOUNTER (OUTPATIENT)
Dept: RADIOLOGY | Facility: HOSPITAL | Age: 52
Discharge: HOME | End: 2022-03-21
Attending: FAMILY MEDICINE
Payer: COMMERCIAL

## 2022-03-21 DIAGNOSIS — F17.210 CIGARETTE NICOTINE DEPENDENCE WITHOUT COMPLICATION: ICD-10-CM

## 2022-03-21 DIAGNOSIS — Z00.00 ANNUAL PHYSICAL EXAM: ICD-10-CM

## 2022-03-21 PROCEDURE — 71271 CT THORAX LUNG CANCER SCR C-: CPT | Mod: TC

## 2022-03-21 PROCEDURE — 71271 CT CHEST LUNG SCREENING LOW DOSE: ICD-10-PCS | Mod: 26,,, | Performed by: RADIOLOGY

## 2022-03-21 PROCEDURE — 71271 CT THORAX LUNG CANCER SCR C-: CPT | Mod: 26,,, | Performed by: RADIOLOGY

## 2022-03-22 ENCOUNTER — PATIENT MESSAGE (OUTPATIENT)
Dept: FAMILY MEDICINE | Facility: CLINIC | Age: 52
End: 2022-03-22
Payer: COMMERCIAL

## 2022-03-22 DIAGNOSIS — R91.8 PULMONARY NODULES: Primary | ICD-10-CM

## 2022-03-29 ENCOUNTER — CLINICAL SUPPORT (OUTPATIENT)
Dept: SMOKING CESSATION | Facility: CLINIC | Age: 52
End: 2022-03-29

## 2022-03-29 DIAGNOSIS — F17.210 MODERATE SMOKER (20 OR LESS PER DAY): Primary | ICD-10-CM

## 2022-03-29 PROCEDURE — 99403 PR PREVENT COUNSEL,INDIV,45 MIN: ICD-10-PCS | Mod: S$GLB,,, | Performed by: INTERNAL MEDICINE

## 2022-03-29 PROCEDURE — 99403 PREV MED CNSL INDIV APPRX 45: CPT | Mod: S$GLB,,, | Performed by: INTERNAL MEDICINE

## 2022-03-29 NOTE — Clinical Note
Patient reports decreasing cigarette smoking from 1 pack per day for 32 years to 5 cigarettes per day. He is pleased with his progress and intends to be tobacco free soon. We discussed and reviewed strategies, habitual behavior, stress, and high risk situations. Introduced stress with addition interventions, SOLVE, relaxation with interventions, nutrition, exercise, weight gain, and the importance of rewarding oneself for accomplishments toward becoming tobacco free. Open discussion of all items with interventions. He finally received Chantix in the mail and started taking them last week. The patient remains on the prescribed tobacco cessation medication regimen of 1 mg Chantix BID without any negative side effects at this time. The patient denies any abnormal behavioral or mental changes at this time. The patient will continue with therapy sessions and medication monitoring by CTTS. Prescribed medication management will be by physician.

## 2022-03-29 NOTE — PROGRESS NOTES
Individual Follow-Up Form    3/29/2022    Quit Date: none    Clinical Status of Patient: Outpatient    Length of Service: 60 minutes    Continuing Medication: yes  Chantix    Other Medications: none     Target Symptoms: Withdrawal and medication side effects. The following were rated moderate (3) to severe (4) on TCRS:   · Moderate (3): none  · Severe (4): none    Comments: #4 Patient reports decreasing cigarette smoking from 1 pack per day for 32 years to 5 cigarettes per day. He is pleased with his progress and intends to be tobacco free soon. We discussed and reviewed strategies, habitual behavior, stress, and high risk situations. Introduced stress with addition interventions, SOLVE, relaxation with interventions, nutrition, exercise, weight gain, and the importance of rewarding oneself for accomplishments toward becoming tobacco free. Open discussion of all items with interventions. He finally received Chantix in the mail and started taking them last week. The patient remains on the prescribed tobacco cessation medication regimen of 1 mg Chantix BID without any negative side effects at this time. The patient denies any abnormal behavioral or mental changes at this time. The patient will continue with therapy sessions and medication monitoring by CTTS. Prescribed medication management will be by physician.     Diagnosis: F17.210    Next Visit: 1 week

## 2022-03-31 ENCOUNTER — OFFICE VISIT (OUTPATIENT)
Dept: UROLOGY | Facility: CLINIC | Age: 52
End: 2022-03-31
Payer: COMMERCIAL

## 2022-03-31 VITALS
DIASTOLIC BLOOD PRESSURE: 82 MMHG | BODY MASS INDEX: 33.75 KG/M2 | HEART RATE: 74 BPM | SYSTOLIC BLOOD PRESSURE: 126 MMHG | WEIGHT: 263 LBS | RESPIRATION RATE: 18 BRPM | HEIGHT: 74 IN

## 2022-03-31 DIAGNOSIS — Z00.00 ANNUAL PHYSICAL EXAM: ICD-10-CM

## 2022-03-31 DIAGNOSIS — Z80.42 FAMILY HX OF PROSTATE CANCER: Primary | ICD-10-CM

## 2022-03-31 DIAGNOSIS — R97.20 ELEVATED PSA, LESS THAN 10 NG/ML: ICD-10-CM

## 2022-03-31 LAB
BILIRUB SERPL-MCNC: ABNORMAL MG/DL
BLOOD URINE, POC: ABNORMAL
CLARITY, POC UA: CLEAR
COLOR, POC UA: YELLOW
GLUCOSE UR QL STRIP: ABNORMAL
KETONES UR QL STRIP: ABNORMAL
LEUKOCYTE ESTERASE URINE, POC: ABNORMAL
NITRITE, POC UA: ABNORMAL
PH, POC UA: 7
PROTEIN, POC: ABNORMAL
SPECIFIC GRAVITY, POC UA: 1.01
UROBILINOGEN, POC UA: 0.2

## 2022-03-31 PROCEDURE — 3008F PR BODY MASS INDEX (BMI) DOCUMENTED: ICD-10-PCS | Mod: CPTII,S$GLB,, | Performed by: NURSE PRACTITIONER

## 2022-03-31 PROCEDURE — 81002 URINALYSIS NONAUTO W/O SCOPE: CPT | Mod: S$GLB,,, | Performed by: NURSE PRACTITIONER

## 2022-03-31 PROCEDURE — 87086 URINE CULTURE/COLONY COUNT: CPT | Performed by: NURSE PRACTITIONER

## 2022-03-31 PROCEDURE — 99999 PR PBB SHADOW E&M-EST. PATIENT-LVL IV: CPT | Mod: PBBFAC,,, | Performed by: NURSE PRACTITIONER

## 2022-03-31 PROCEDURE — 1159F MED LIST DOCD IN RCRD: CPT | Mod: CPTII,S$GLB,, | Performed by: NURSE PRACTITIONER

## 2022-03-31 PROCEDURE — 4010F PR ACE/ARB THEARPY RXD/TAKEN: ICD-10-PCS | Mod: CPTII,S$GLB,, | Performed by: NURSE PRACTITIONER

## 2022-03-31 PROCEDURE — 3074F SYST BP LT 130 MM HG: CPT | Mod: CPTII,S$GLB,, | Performed by: NURSE PRACTITIONER

## 2022-03-31 PROCEDURE — 3008F BODY MASS INDEX DOCD: CPT | Mod: CPTII,S$GLB,, | Performed by: NURSE PRACTITIONER

## 2022-03-31 PROCEDURE — 1160F PR REVIEW ALL MEDS BY PRESCRIBER/CLIN PHARMACIST DOCUMENTED: ICD-10-PCS | Mod: CPTII,S$GLB,, | Performed by: NURSE PRACTITIONER

## 2022-03-31 PROCEDURE — 4010F ACE/ARB THERAPY RXD/TAKEN: CPT | Mod: CPTII,S$GLB,, | Performed by: NURSE PRACTITIONER

## 2022-03-31 PROCEDURE — 1160F RVW MEDS BY RX/DR IN RCRD: CPT | Mod: CPTII,S$GLB,, | Performed by: NURSE PRACTITIONER

## 2022-03-31 PROCEDURE — 99204 PR OFFICE/OUTPT VISIT, NEW, LEVL IV, 45-59 MIN: ICD-10-PCS | Mod: S$GLB,,, | Performed by: NURSE PRACTITIONER

## 2022-03-31 PROCEDURE — 99999 PR PBB SHADOW E&M-EST. PATIENT-LVL IV: ICD-10-PCS | Mod: PBBFAC,,, | Performed by: NURSE PRACTITIONER

## 2022-03-31 PROCEDURE — 1159F PR MEDICATION LIST DOCUMENTED IN MEDICAL RECORD: ICD-10-PCS | Mod: CPTII,S$GLB,, | Performed by: NURSE PRACTITIONER

## 2022-03-31 PROCEDURE — 3074F PR MOST RECENT SYSTOLIC BLOOD PRESSURE < 130 MM HG: ICD-10-PCS | Mod: CPTII,S$GLB,, | Performed by: NURSE PRACTITIONER

## 2022-03-31 PROCEDURE — 99204 OFFICE O/P NEW MOD 45 MIN: CPT | Mod: S$GLB,,, | Performed by: NURSE PRACTITIONER

## 2022-03-31 PROCEDURE — 3079F PR MOST RECENT DIASTOLIC BLOOD PRESSURE 80-89 MM HG: ICD-10-PCS | Mod: CPTII,S$GLB,, | Performed by: NURSE PRACTITIONER

## 2022-03-31 PROCEDURE — 87088 URINE BACTERIA CULTURE: CPT | Performed by: NURSE PRACTITIONER

## 2022-03-31 PROCEDURE — 3079F DIAST BP 80-89 MM HG: CPT | Mod: CPTII,S$GLB,, | Performed by: NURSE PRACTITIONER

## 2022-03-31 PROCEDURE — 87147 CULTURE TYPE IMMUNOLOGIC: CPT | Performed by: NURSE PRACTITIONER

## 2022-03-31 PROCEDURE — 81002 POCT URINE DIPSTICK WITHOUT MICROSCOPE: ICD-10-PCS | Mod: S$GLB,,, | Performed by: NURSE PRACTITIONER

## 2022-03-31 NOTE — PROGRESS NOTES
[facility] Urology Clinic Note  Staff: LIZZ Monaco-C    PCP: DO Alfonso    Chief Complaint: Elevated PSA level    Subjective:        HPI: Paolo Brower is a 51 y.o. male NEW PT presents today in office for evaluation of annual prostate exam at this time.    Pt was a former pt of Dr. Epsteins, last ov on 10/31/2018 for Cysto with stent removal due to hx of [place]-vesical fistulae and for colon surgery at that time.    Past PSA level results:  02/17/22-3.2  07/14/20-1.2  07/16/18-21.4    Fam Hx:   malignancies: Yes - Father in 50s/60s with prostate cancer.      Soc Hx:  +tobacco use    TODAY:  UA today showed trace of leukocytes, otherwise normal findings.  Nocturia 1x nightly.  Overall pt feels he empties his bladder satisfactorily.    Last PSA Screening:   Lab Results   Component Value Date    PSA 3.2 02/17/2022    PSA 1.2 07/14/2020    PSA 21.4 (H) 07/16/2018     REVIEW OF SYSTEMS:  A comprehensive 10 system review was performed and is negative except as noted above in HPI    PMHx:  Past Medical History:   Diagnosis Date    Diverticular stricture     Diverticulitis     Essential hypertension 05/07/2021    Known health problems: none      PSHx:  Past Surgical History:   Procedure Laterality Date    APPENDECTOMY      BLADDER REPAIR  9/18/2018    Procedure: REPAIR, BLADDER;  Surgeon: Rene Kinney MD;  Location: [facility] OR;  Service: General;;  COMPLEX     COLECTOMY N/A 9/18/2018    Procedure: COLECTOMY;  Surgeon: Rene Kinney MD;  Location: [facility] OR;  Service: General;  Laterality: N/A;    COLONOSCOPIC SURGICAL PROCEDURE  9/18/2018    Procedure: SURGICAL PROCEDURE, COLONOSCOPIC;  Surgeon: Rene Kinney MD;  Location: [facility] OR;  Service: General;;    COLONOSCOPY  03/26/2018    COLONOSCOPY N/A 9/17/2018    Procedure: COLONOSCOPY;  Surgeon: Rene Kinney MD;  Location: [facility] ENDO;  Service: General;  Laterality: N/A;  WITH POLYPECTOMY AND TATTOOING     CYSTOSCOPY W/ RETROGRADES Bilateral 10/31/2018    Procedure: CYSTOSCOPY, WITH RETROGRADE PYELOGRAM;  Surgeon: Charli Shaikh MD;  Location: [facility] OR;  Service: Urology;  Laterality: Bilateral;    CYSTOSCOPY W/ URETERAL STENT PLACEMENT Bilateral 9/12/2018    Procedure: CYSTOSCOPY, WITH URETERAL STENT INSERTION;  Surgeon: Charli Shaikh MD;  Location: [facility] OR;  Service: Urology;  Laterality: Bilateral;    CYSTOSCOPY W/ URETERAL STENT REMOVAL Bilateral 10/31/2018    Procedure: CYSTOSCOPY, WITH URETERAL STENT REMOVAL;  Surgeon: Charli Shaikh MD;  Location: [facility] OR;  Service: Urology;  Laterality: Bilateral;    LOW ANTERIOR RESECTION OF COLON  9/18/2018    Procedure: RESECTION, COLON, LOW ANTERIOR;  Surgeon: Rene Kinney MD;  Location: [facility] OR;  Service: General;;    TONSILLECTOMY       Allergies:  Patient has no known allergies.    Medications: reviewed     Objective:     Vitals:    03/31/22 1513   BP: 126/82   Pulse: 74   Resp: 18     General:WDWN in NAD  Eyes: PERRLA, normal conjunctiva  Respiratory: no increased work on breathing, clear to auscultation  Cardiovascular: regular rate and rhythm. No obvious extremity edema.  GI: palpation of masses. No tenderness. No hepatosplenomegaly to palpation.  Musculoskeletal: normal range of motion of bilateral upper extremities. Normal muscle strength and tone.  Skin: no obvious rashes or lesions. No tightening of skin noted.  Neurologic: CN grossly normal. Normal sensation.   Psychiatric: awake, alert and oriented x 3. Mood and affect normal. Cooperative.     Exam performed by me in office today:  Inspection of anus and perineum normal  JOHNNY: 25g-30g prostate gland without nodules, masses, tenderness. SV not palpable. Normal sphincter tone.   +Outer hemhorroids.    LABS REVIEW:  UA today:  Color:Clear, Yellow  Spec. Grav.  1.015  PH  7.0  Trace Leukocytes  Negative for nitrates, protein, glucose, ketones, urobili, bili, and blood.    Assessment:        1. Family hx of prostate cancer    2. Annual physical exam    3. Elevated PSA, less than 10 ng/ml          Plan:     Urine culture to be processed today.  PSA level to be repeated in six months--total and free.    F/u in one year or sooner if new  Symptoms develop.    [facility]: Active    Bee Bennett, FNP-C

## 2022-04-01 LAB — BACTERIA UR CULT: ABNORMAL

## 2022-04-04 RX ORDER — AMOXICILLIN AND CLAVULANATE POTASSIUM 875; 125 MG/1; MG/1
1 TABLET, FILM COATED ORAL 2 TIMES DAILY
Qty: 28 TABLET | Refills: 0 | Status: SHIPPED | OUTPATIENT
Start: 2022-04-04 | End: 2022-04-18

## 2022-04-05 ENCOUNTER — CLINICAL SUPPORT (OUTPATIENT)
Dept: SMOKING CESSATION | Facility: CLINIC | Age: 52
End: 2022-04-05

## 2022-04-05 DIAGNOSIS — F17.210 MODERATE SMOKER (20 OR LESS PER DAY): Primary | ICD-10-CM

## 2022-04-05 PROCEDURE — 99404 PREV MED CNSL INDIV APPRX 60: CPT | Mod: S$GLB,,, | Performed by: INTERNAL MEDICINE

## 2022-04-05 PROCEDURE — 99404 PR PREVENT COUNSEL,INDIV,60 MIN: ICD-10-PCS | Mod: S$GLB,,, | Performed by: INTERNAL MEDICINE

## 2022-04-05 RX ORDER — VARENICLINE TARTRATE 1 MG/1
1 TABLET, FILM COATED ORAL 2 TIMES DAILY
Qty: 56 TABLET | Refills: 0 | Status: SHIPPED | OUTPATIENT
Start: 2022-04-05 | End: 2022-05-24 | Stop reason: SDUPTHER

## 2022-04-05 NOTE — PROGRESS NOTES
Individual Follow-Up Form    4/5/2022    Quit Date: none    Clinical Status of Patient: Outpatient    Length of Service: 60 minutes    Continuing Medication: yes  Chantix    Other Medications: none    · Moderate (3): none  · Severe (4): none    Comments: #5 Patient reports decreasing cigarette smoking from 1 pack per for 32 years to 3 cigarettes today. He remains determined to stop smoking and intends to be tobacco free soon. We discussed and reviewed strategies, habitual behavior, high risks situations, understanding urges and cravings, stress and relaxation with open discussion and additional interventions, Introduced lapses, relapses, understanding them and analyzing the situation of a lapse, conflict issues that may be linked to a lapse. The patient remains on the prescribed tobacco cessation medication regimen of 1 mg Chantix BID without any negative side effects at this time. The patient denies any abnormal behavioral or mental changes at this time. The patient will continue with therapy sessions and medication monitoring by CTTS. Prescribed medication management will be by physician.     Diagnosis: F17.210    Next Visit: 1 week

## 2022-04-05 NOTE — Clinical Note
Patient reports decreasing cigarette smoking from 1 pack per for 32 years to 3 cigarettes today. He remains determined to stop smoking and intends to be tobacco free soon. We discussed and reviewed strategies, habitual behavior, high risks situations, understanding urges and cravings, stress and relaxation with open discussion and additional interventions, Introduced lapses, relapses, understanding them and analyzing the situation of a lapse, conflict issues that may be linked to a lapse. The patient remains on the prescribed tobacco cessation medication regimen of 1 mg Chantix BID without any negative side effects at this time. The patient denies any abnormal behavioral or mental changes at this time. The patient will continue with therapy sessions and medication monitoring by CTTS. Prescribed medication management will be by physician.

## 2022-04-19 ENCOUNTER — CLINICAL SUPPORT (OUTPATIENT)
Dept: SMOKING CESSATION | Facility: CLINIC | Age: 52
End: 2022-04-19

## 2022-04-19 DIAGNOSIS — F17.210 CIGARETTE NICOTINE DEPENDENCE, UNCOMPLICATED: Primary | ICD-10-CM

## 2022-04-19 PROCEDURE — 99404 PR PREVENT COUNSEL,INDIV,60 MIN: ICD-10-PCS | Mod: S$GLB,,, | Performed by: INTERNAL MEDICINE

## 2022-04-19 PROCEDURE — 99404 PREV MED CNSL INDIV APPRX 60: CPT | Mod: S$GLB,,, | Performed by: INTERNAL MEDICINE

## 2022-04-19 NOTE — Clinical Note
Patient reports being tobacco since 4/18/22 after smoking 1 pack of cigarettes per day for 32 years. He is pleased with his progress and intends to never smoke again. We discussed and reviewed strategies, cues, triggers, high risk situations, lapses, relapses, diet, exercise, stress, relaxation, sleep, habitual behavior, and life style changes. The patient remains on the prescribed tobacco cessation medication regimen of 1 mg Chantix BID without any negative side effects at this time. The patient denies any abnormal behavioral or mental changes at this time. The patient will continue with therapy sessions and medication monitoring by CTTS. Prescribed medication management will be by physician.

## 2022-04-19 NOTE — PROGRESS NOTES
Individual Follow-Up Form    4/19/2022    Quit Date: 4/18/22    Clinical Status of Patient: Outpatient    Length of Service: 60 minutes    Continuing Medication: yes  Chantix    Other Medications: NONE     Target Symptoms: Withdrawal and medication side effects. The following were rated moderate (3) to severe (4) on TCRS:  · Moderate (3): none  · Severe (4): none    Comments: #6 Patient reports being tobacco since 4/18/22 after smoking 1 pack of cigarettes per day for 32 years. He is pleased with his progress and intends to never smoke again. We discussed and reviewed strategies, cues, triggers, high risk situations, lapses, relapses, diet, exercise, stress, relaxation, sleep, habitual behavior, and life style changes. The patient remains on the prescribed tobacco cessation medication regimen of 1 mg Chantix BID without any negative side effects at this time. The patient denies any abnormal behavioral or mental changes at this time. The patient will continue with therapy sessions and medication monitoring by CTTS. Prescribed medication management will be by physician.     Diagnosis: F17.210    Next Visit: 1 week

## 2022-04-26 DIAGNOSIS — I10 ESSENTIAL HYPERTENSION: ICD-10-CM

## 2022-04-26 RX ORDER — LOSARTAN POTASSIUM 50 MG/1
50 TABLET ORAL DAILY
Qty: 90 TABLET | Refills: 3 | Status: SHIPPED | OUTPATIENT
Start: 2022-04-26 | End: 2022-10-19 | Stop reason: SDUPTHER

## 2022-04-26 NOTE — TELEPHONE ENCOUNTER
----- Message from Mily Whitman sent at 4/26/2022 12:27 PM CDT -----  .Type: RX Refill Request    Who Called: Self     Have you contacted your pharmacy:    Refill or New Rx:   refill     RX Name and Strength: losartan (COZAAR) 50 MG tablet    How is the patient currently taking it? (ex. 1XDay): 1 x day     Is this a 30 day or 90 day RX: 90 day     Preferred Pharmacy with phone number:   [facility] [address] [place] [address] - [address] [address]  Phone: [phone] Fax: [phone]      Local or Mail Order Local     Ordering Provider: Dr. Hamilton     Would the patient rather a call back or a response via [facility]? Call back     Best Call Back Number: [phone]    Thank you

## 2022-05-08 ENCOUNTER — PATIENT MESSAGE (OUTPATIENT)
Dept: ADMINISTRATIVE | Facility: OTHER | Age: 52
End: 2022-05-08
Payer: COMMERCIAL

## 2022-05-24 ENCOUNTER — CLINICAL SUPPORT (OUTPATIENT)
Dept: SMOKING CESSATION | Facility: CLINIC | Age: 52
End: 2022-05-24

## 2022-05-24 DIAGNOSIS — F17.210 MODERATE SMOKER (20 OR LESS PER DAY): ICD-10-CM

## 2022-05-24 DIAGNOSIS — F17.210 CIGARETTE NICOTINE DEPENDENCE, UNCOMPLICATED: Primary | ICD-10-CM

## 2022-05-24 PROCEDURE — 99402 PREV MED CNSL INDIV APPRX 30: CPT | Mod: S$GLB,,, | Performed by: INTERNAL MEDICINE

## 2022-05-24 PROCEDURE — 99402 PR PREVENT COUNSEL,INDIV,30 MIN: ICD-10-PCS | Mod: S$GLB,,, | Performed by: INTERNAL MEDICINE

## 2022-05-24 RX ORDER — VARENICLINE TARTRATE 1 MG/1
1 TABLET, FILM COATED ORAL 2 TIMES DAILY
Qty: 56 TABLET | Refills: 0 | Status: SHIPPED | OUTPATIENT
Start: 2022-05-24 | End: 2022-06-21 | Stop reason: SDUPTHER

## 2022-05-24 NOTE — Clinical Note
Patient reports having a lapse and smoked about 5 cigarettes while camping with family and friend over the weekend. He did not buy and cigarettes and has not smoked since then. He was tobacco free since 4/15/22 after smoking 1 pack per day for 32 years. We discussed and reviewed strategies, cues, and triggers. Introduced the negative impact of tobacco on health, the health advantages of discontinuing the use of tobacco, time line improved health changes after a quit, withdrawal issues to expect from nicotine and habit, and ways to achieve the goal of a quit. The patient remains on the prescribed tobacco cessation medication regimen of 1 mg Chantix BID without any negative side effects at this time. The patient denies any abnormal behavioral or mental changes at this time. The patient will continue with therapy sessions and medication monitoring by CTTS. Prescribed medication management will be by physician.

## 2022-05-24 NOTE — PROGRESS NOTES
Individual Follow-Up Form    5/24/2022    Quit Date: 4/15/22    Clinical Status of Patient: Outpatient    Length of Service: 60 minutes    Continuing Medication: yes  Chantix    Other Medications: none     Target Symptoms: Withdrawal and medication side effects. The following were rated moderate (3) to severe (4) on TCRS:  · Moderate (3): none  · Severe (4): none    Comments:  #1 Patient reports having a lapse and smoked about 5 cigarettes while camping with family and friend over the weekend. He did not buy and cigarettes and has not smoked since then. He was tobacco free since 4/15/22 after smoking 1 pack per day for 32 years. We discussed and reviewed strategies, cues, and triggers. Introduced the negative impact of tobacco on health, the health advantages of discontinuing the use of tobacco, time line improved health changes after a quit, withdrawal issues to expect from nicotine and habit, and ways to achieve the goal of a quit. The patient remains on the prescribed tobacco cessation medication regimen of 1 mg Chantix BID without any negative side effects at this time. The patient denies any abnormal behavioral or mental changes at this time. The patient will continue with therapy sessions and medication monitoring by CTTS. Prescribed medication management will be by physician.     Diagnosis: F17.210    Next Visit: 1 week

## 2022-06-02 ENCOUNTER — CLINICAL SUPPORT (OUTPATIENT)
Dept: SMOKING CESSATION | Facility: CLINIC | Age: 52
End: 2022-06-02

## 2022-06-02 DIAGNOSIS — F17.200 NICOTINE DEPENDENCE: Primary | ICD-10-CM

## 2022-06-02 PROCEDURE — 99402 PR PREVENT COUNSEL,INDIV,30 MIN: ICD-10-PCS | Mod: S$GLB,,, | Performed by: GENERAL PRACTICE

## 2022-06-02 PROCEDURE — 99402 PREV MED CNSL INDIV APPRX 30: CPT | Mod: S$GLB,,, | Performed by: GENERAL PRACTICE

## 2022-06-02 NOTE — PROGRESS NOTES
Individual Follow-Up Form    6/2/2022    Quit Date: 4-    Clinical Status of Patient: Outpatient    Length of Service: 30 minutes    Continuing Medication: yes  Chantix       Target Symptoms: Withdrawal and medication side effects. The following were  rated moderate (3) to severe (4) on TCRS:  · Moderate (3): vivid dreams  · Severe (4): none    Comments: Spoke with patient in regards to his smoking cessation progress. He remains tobacco free at this time. The patient remains on the prescribed tobacco cessation medication regimen of 1 mg Chantix BID without any negative side effects at this time. He states thatv he has minimal urges. His urges are when he is around his friend group and around other smokers. He states that he has been able to refrain from smoking but at first it was hard being around other smokers but it is getting easier. He is having vivid dreams but her states that it is not causing sleep disturbances. Congratulated him on his continued progress. The patient denies any abnormal behavioral or mental changes at this time. Will continue to encourage and monitor progress.    Diagnosis: F17.200    Next Visit: 1 week

## 2022-06-21 ENCOUNTER — CLINICAL SUPPORT (OUTPATIENT)
Dept: SMOKING CESSATION | Facility: CLINIC | Age: 52
End: 2022-06-21
Payer: COMMERCIAL

## 2022-06-21 ENCOUNTER — TELEPHONE (OUTPATIENT)
Dept: FAMILY MEDICINE | Facility: CLINIC | Age: 52
End: 2022-06-21
Payer: COMMERCIAL

## 2022-06-21 DIAGNOSIS — F17.210 CIGARETTE NICOTINE DEPENDENCE, UNCOMPLICATED: ICD-10-CM

## 2022-06-21 DIAGNOSIS — F17.200 NICOTINE DEPENDENCE: Primary | ICD-10-CM

## 2022-06-21 PROCEDURE — 99407 BEHAV CHNG SMOKING > 10 MIN: CPT | Mod: S$GLB,,, | Performed by: GENERAL PRACTICE

## 2022-06-21 PROCEDURE — 99407 PR TOBACCO USE CESSATION INTENSIVE >10 MINUTES: ICD-10-PCS | Mod: S$GLB,,, | Performed by: GENERAL PRACTICE

## 2022-06-21 RX ORDER — VARENICLINE TARTRATE 1 MG/1
1 TABLET, FILM COATED ORAL 2 TIMES DAILY
Qty: 56 TABLET | Refills: 0 | Status: SHIPPED | OUTPATIENT
Start: 2022-06-21 | End: 2022-10-19

## 2022-09-23 ENCOUNTER — HOSPITAL ENCOUNTER (OUTPATIENT)
Dept: RADIOLOGY | Facility: HOSPITAL | Age: 52
Discharge: HOME | End: 2022-09-23
Attending: FAMILY MEDICINE
Payer: COMMERCIAL

## 2022-09-23 DIAGNOSIS — R91.8 PULMONARY NODULES: ICD-10-CM

## 2022-09-23 PROCEDURE — 71250 CT CHEST WITHOUT CONTRAST: ICD-10-PCS | Mod: 26,,, | Performed by: RADIOLOGY

## 2022-09-23 PROCEDURE — 71250 CT THORAX DX C-: CPT | Mod: TC,PN

## 2022-09-23 PROCEDURE — 71250 CT THORAX DX C-: CPT | Mod: 26,,, | Performed by: RADIOLOGY

## 2022-09-30 ENCOUNTER — LAB VISIT (OUTPATIENT)
Dept: LAB | Facility: HOSPITAL | Age: 52
End: 2022-09-30
Attending: NURSE PRACTITIONER
Payer: COMMERCIAL

## 2022-09-30 DIAGNOSIS — R97.20 ELEVATED PSA, LESS THAN 10 NG/ML: ICD-10-CM

## 2022-09-30 DIAGNOSIS — Z80.42 FAMILY HX OF PROSTATE CANCER: ICD-10-CM

## 2022-09-30 DIAGNOSIS — Z00.00 ANNUAL PHYSICAL EXAM: ICD-10-CM

## 2022-09-30 LAB
PROSTATE SPECIFIC ANTIGEN, TOTAL: 1.1 NG/ML (ref 0–4)
PSA FREE MFR SERPL: 28.18 %
PSA FREE SERPL-MCNC: 0.31 NG/ML (ref 0–1.5)

## 2022-09-30 PROCEDURE — 36415 COLL VENOUS BLD VENIPUNCTURE: CPT | Performed by: NURSE PRACTITIONER

## 2022-09-30 PROCEDURE — 84153 ASSAY OF PSA TOTAL: CPT | Performed by: NURSE PRACTITIONER

## 2022-10-05 ENCOUNTER — CLINICAL SUPPORT (OUTPATIENT)
Dept: SMOKING CESSATION | Facility: CLINIC | Age: 52
End: 2022-10-05

## 2022-10-05 DIAGNOSIS — F17.200 NICOTINE DEPENDENCE: Primary | ICD-10-CM

## 2022-10-05 PROCEDURE — 99999 PR PBB SHADOW E&M-EST. PATIENT-LVL I: CPT | Mod: PBBFAC,,,

## 2022-10-05 PROCEDURE — 99999 PR PBB SHADOW E&M-EST. PATIENT-LVL I: ICD-10-PCS | Mod: PBBFAC,,,

## 2022-10-05 PROCEDURE — 99407 BEHAV CHNG SMOKING > 10 MIN: CPT | Mod: S$GLB,,, | Performed by: GENERAL PRACTICE

## 2022-10-05 PROCEDURE — 99407 PR TOBACCO USE CESSATION INTENSIVE >10 MINUTES: ICD-10-PCS | Mod: S$GLB,,, | Performed by: GENERAL PRACTICE

## 2022-10-05 NOTE — PROGRESS NOTES
Spoke with patient today in regard to smoking cessation progress 3 and 6 month telephone follow up, he states that he is tobacco free.  Commended patient on the accomplishments thus far.  Informed patient of benefit period, future follow up, and contact information if any further help or support is needed.  Will complete smart form for 3 and 6 month follow up on Quit attempt #1

## 2022-10-19 ENCOUNTER — OFFICE VISIT (OUTPATIENT)
Dept: FAMILY MEDICINE | Facility: CLINIC | Age: 52
End: 2022-10-19
Payer: COMMERCIAL

## 2022-10-19 VITALS
HEIGHT: 74 IN | HEART RATE: 79 BPM | SYSTOLIC BLOOD PRESSURE: 134 MMHG | BODY MASS INDEX: 34.93 KG/M2 | RESPIRATION RATE: 18 BRPM | WEIGHT: 272.13 LBS | DIASTOLIC BLOOD PRESSURE: 80 MMHG | OXYGEN SATURATION: 98 %

## 2022-10-19 DIAGNOSIS — N32.1 COLOVESICAL FISTULA: ICD-10-CM

## 2022-10-19 DIAGNOSIS — Z13.220 ENCOUNTER FOR LIPID SCREENING FOR CARDIOVASCULAR DISEASE: ICD-10-CM

## 2022-10-19 DIAGNOSIS — I10 ESSENTIAL HYPERTENSION: Primary | ICD-10-CM

## 2022-10-19 DIAGNOSIS — Z80.42 FAMILY HISTORY OF PROSTATE CANCER: ICD-10-CM

## 2022-10-19 DIAGNOSIS — Z13.6 ENCOUNTER FOR LIPID SCREENING FOR CARDIOVASCULAR DISEASE: ICD-10-CM

## 2022-10-19 DIAGNOSIS — R91.8 LUNG NODULES: ICD-10-CM

## 2022-10-19 PROBLEM — K57.92 DIVERTICULITIS: Status: RESOLVED | Noted: 2018-09-07 | Resolved: 2022-10-19

## 2022-10-19 PROBLEM — B96.81 HELICOBACTER POSITIVE GASTRITIS: Status: RESOLVED | Noted: 2018-04-04 | Resolved: 2022-10-19

## 2022-10-19 PROBLEM — L98.8 FISTULA: Status: RESOLVED | Noted: 2018-10-17 | Resolved: 2022-10-19

## 2022-10-19 PROBLEM — K29.70 HELICOBACTER POSITIVE GASTRITIS: Status: RESOLVED | Noted: 2018-04-04 | Resolved: 2022-10-19

## 2022-10-19 PROCEDURE — 99214 OFFICE O/P EST MOD 30 MIN: CPT | Mod: 25,S$GLB,, | Performed by: STUDENT IN AN ORGANIZED HEALTH CARE EDUCATION/TRAINING PROGRAM

## 2022-10-19 PROCEDURE — 90686 FLU VACCINE (QUAD) GREATER THAN OR EQUAL TO 3YO PRESERVATIVE FREE IM: ICD-10-PCS | Mod: S$GLB,,, | Performed by: STUDENT IN AN ORGANIZED HEALTH CARE EDUCATION/TRAINING PROGRAM

## 2022-10-19 PROCEDURE — 1159F MED LIST DOCD IN RCRD: CPT | Mod: S$GLB,,, | Performed by: STUDENT IN AN ORGANIZED HEALTH CARE EDUCATION/TRAINING PROGRAM

## 2022-10-19 PROCEDURE — 1159F PR MEDICATION LIST DOCUMENTED IN MEDICAL RECORD: ICD-10-PCS | Mod: S$GLB,,, | Performed by: STUDENT IN AN ORGANIZED HEALTH CARE EDUCATION/TRAINING PROGRAM

## 2022-10-19 PROCEDURE — 99214 PR OFFICE/OUTPT VISIT, EST, LEVL IV, 30-39 MIN: ICD-10-PCS | Mod: 25,S$GLB,, | Performed by: STUDENT IN AN ORGANIZED HEALTH CARE EDUCATION/TRAINING PROGRAM

## 2022-10-19 PROCEDURE — 4010F ACE/ARB THERAPY RXD/TAKEN: CPT | Mod: S$GLB,,, | Performed by: STUDENT IN AN ORGANIZED HEALTH CARE EDUCATION/TRAINING PROGRAM

## 2022-10-19 PROCEDURE — 3079F PR MOST RECENT DIASTOLIC BLOOD PRESSURE 80-89 MM HG: ICD-10-PCS | Mod: S$GLB,,, | Performed by: STUDENT IN AN ORGANIZED HEALTH CARE EDUCATION/TRAINING PROGRAM

## 2022-10-19 PROCEDURE — 99999 PR PBB SHADOW E&M-EST. PATIENT-LVL III: ICD-10-PCS | Mod: PBBFAC,,, | Performed by: STUDENT IN AN ORGANIZED HEALTH CARE EDUCATION/TRAINING PROGRAM

## 2022-10-19 PROCEDURE — 3079F DIAST BP 80-89 MM HG: CPT | Mod: S$GLB,,, | Performed by: STUDENT IN AN ORGANIZED HEALTH CARE EDUCATION/TRAINING PROGRAM

## 2022-10-19 PROCEDURE — 4010F PR ACE/ARB THEARPY RXD/TAKEN: ICD-10-PCS | Mod: S$GLB,,, | Performed by: STUDENT IN AN ORGANIZED HEALTH CARE EDUCATION/TRAINING PROGRAM

## 2022-10-19 PROCEDURE — 1160F PR REVIEW ALL MEDS BY PRESCRIBER/CLIN PHARMACIST DOCUMENTED: ICD-10-PCS | Mod: S$GLB,,, | Performed by: STUDENT IN AN ORGANIZED HEALTH CARE EDUCATION/TRAINING PROGRAM

## 2022-10-19 PROCEDURE — 90471 IMMUNIZATION ADMIN: CPT | Mod: S$GLB,,, | Performed by: STUDENT IN AN ORGANIZED HEALTH CARE EDUCATION/TRAINING PROGRAM

## 2022-10-19 PROCEDURE — 3075F PR MOST RECENT SYSTOLIC BLOOD PRESS GE 130-139MM HG: ICD-10-PCS | Mod: S$GLB,,, | Performed by: STUDENT IN AN ORGANIZED HEALTH CARE EDUCATION/TRAINING PROGRAM

## 2022-10-19 PROCEDURE — 1160F RVW MEDS BY RX/DR IN RCRD: CPT | Mod: S$GLB,,, | Performed by: STUDENT IN AN ORGANIZED HEALTH CARE EDUCATION/TRAINING PROGRAM

## 2022-10-19 PROCEDURE — 90686 IIV4 VACC NO PRSV 0.5 ML IM: CPT | Mod: S$GLB,,, | Performed by: STUDENT IN AN ORGANIZED HEALTH CARE EDUCATION/TRAINING PROGRAM

## 2022-10-19 PROCEDURE — 3075F SYST BP GE 130 - 139MM HG: CPT | Mod: S$GLB,,, | Performed by: STUDENT IN AN ORGANIZED HEALTH CARE EDUCATION/TRAINING PROGRAM

## 2022-10-19 PROCEDURE — 90471 FLU VACCINE (QUAD) GREATER THAN OR EQUAL TO 3YO PRESERVATIVE FREE IM: ICD-10-PCS | Mod: S$GLB,,, | Performed by: STUDENT IN AN ORGANIZED HEALTH CARE EDUCATION/TRAINING PROGRAM

## 2022-10-19 PROCEDURE — 99999 PR PBB SHADOW E&M-EST. PATIENT-LVL III: CPT | Mod: PBBFAC,,, | Performed by: STUDENT IN AN ORGANIZED HEALTH CARE EDUCATION/TRAINING PROGRAM

## 2022-10-19 RX ORDER — LOSARTAN POTASSIUM 50 MG/1
50 TABLET ORAL DAILY
Qty: 90 TABLET | Refills: 3 | Status: SHIPPED | OUTPATIENT
Start: 2022-10-19 | End: 2023-10-31

## 2022-10-19 NOTE — ASSESSMENT & PLAN NOTE
Dad had prostate cancer. Pt is seeing urology  PSA, Screen (ng/mL)   Date Value   02/17/2022 3.2     PSA Total (ng/mL)   Date Value   09/30/2022 1.1     PSA, Free (ng/mL)   Date Value   09/30/2022 0.31     PSA, Free % (%)   Date Value   09/30/2022 28.18

## 2022-10-19 NOTE — ASSESSMENT & PLAN NOTE
Stable and doing well on losartan. On digital medicine and has bps 140s at home and it is much lower at the visit with manual cuff.

## 2022-10-19 NOTE — PATIENT INSTRUCTIONS

## 2022-10-19 NOTE — PROGRESS NOTES
Subjective:       Patient ID: Paolo Brower is a 52 y.o. male.    Chief Complaint: Follow-up (States he has wounds that heal slowly )    Patient Active Problem List:     Family history of colon cancer/Hyperplastic colonic polyp/ Colovesical fistula- following with GI     Essential hypertension- stable on losartan. Not checking bps at home     Lung nodules- stable at 12 months in march. Repeat yearly.      Family history of prostate cancer- following with urology. Last psa in normal range.     Current Outpatient Medications:  losartan (COZAAR) 50 MG tablet, Take 1 tablet (50 mg total) by mouth once daily.    No current facility-administered medications for this visit.        Review of Systems   Constitutional:  Negative for activity change and appetite change.   Respiratory:  Negative for shortness of breath.    Cardiovascular:  Negative for chest pain.   Gastrointestinal:  Negative for abdominal pain.   Genitourinary:  Negative for dysuria.   Musculoskeletal:  Positive for arthralgias.   Integumentary:  Negative for rash.   Psychiatric/Behavioral:  Negative for dysphoric mood and sleep disturbance. The patient is not nervous/anxious.        Objective:      Physical Exam  Constitutional:       General: He is not in acute distress.     Appearance: Normal appearance. He is not ill-appearing.   Eyes:      Conjunctiva/sclera: Conjunctivae normal.   Cardiovascular:      Rate and Rhythm: Normal rate and regular rhythm.      Pulses: Normal pulses.      Heart sounds: Normal heart sounds. No murmur heard.  Pulmonary:      Effort: Pulmonary effort is normal. No respiratory distress.      Breath sounds: Normal breath sounds. No wheezing.   Abdominal:      Palpations: Abdomen is soft.   Musculoskeletal:      Right lower leg: No edema.      Left lower leg: No edema.   Skin:     General: Skin is warm and dry.      Findings: No rash.   Neurological:      Mental Status: He is alert. Mental status is at baseline.      Gait: Gait  normal.   Psychiatric:         Mood and Affect: Mood normal.         Behavior: Behavior normal.         Thought Content: Thought content normal.         Judgment: Judgment normal.       Assessment:       1. Essential hypertension    2. Colovesical fistula    3. Lung nodules    4. Encounter for lipid screening for cardiovascular disease    5. Family history of prostate cancer          Plan:       Problem List Items Addressed This Visit          Pulmonary    Lung nodules     Stable over a year.  Followup low dose CT each March            Cardiac/Vascular    Essential hypertension - Primary     Stable and doing well on losartan. On digital medicine and has bps 140s at home and it is much lower at the visit with manual cuff.         Relevant Medications    losartan (COZAAR) 50 MG tablet       Renal/    Colovesical fistula     After diverticulitis. S/p repair and doing well            Oncology    Family history of prostate cancer     Dad had prostate cancer. Pt is seeing urology  PSA, Screen (ng/mL)   Date Value   02/17/2022 3.2     PSA Total (ng/mL)   Date Value   09/30/2022 1.1     PSA, Free (ng/mL)   Date Value   09/30/2022 0.31     PSA, Free % (%)   Date Value   09/30/2022 28.18               Other Visit Diagnoses       Encounter for lipid screening for cardiovascular disease        Relevant Orders    Lipid Panel

## 2023-01-05 ENCOUNTER — PATIENT MESSAGE (OUTPATIENT)
Dept: ADMINISTRATIVE | Facility: OTHER | Age: 53
End: 2023-01-05
Payer: COMMERCIAL

## 2023-03-23 ENCOUNTER — CLINICAL SUPPORT (OUTPATIENT)
Dept: SMOKING CESSATION | Facility: CLINIC | Age: 53
End: 2023-03-23

## 2023-03-23 DIAGNOSIS — F17.200 NICOTINE DEPENDENCE: Primary | ICD-10-CM

## 2023-03-23 PROCEDURE — 99999 PR PBB SHADOW E&M-EST. PATIENT-LVL I: CPT | Mod: PBBFAC,,,

## 2023-03-23 PROCEDURE — 99407 PR TOBACCO USE CESSATION INTENSIVE >10 MINUTES: ICD-10-PCS | Mod: S$GLB,,, | Performed by: FAMILY MEDICINE

## 2023-03-23 PROCEDURE — 99999 PR PBB SHADOW E&M-EST. PATIENT-LVL I: ICD-10-PCS | Mod: PBBFAC,,,

## 2023-03-23 PROCEDURE — 99407 BEHAV CHNG SMOKING > 10 MIN: CPT | Mod: S$GLB,,, | Performed by: FAMILY MEDICINE

## 2023-05-12 ENCOUNTER — OFFICE VISIT (OUTPATIENT)
Dept: FAMILY MEDICINE | Facility: CLINIC | Age: 53
End: 2023-05-12
Payer: COMMERCIAL

## 2023-05-12 ENCOUNTER — TELEPHONE (OUTPATIENT)
Dept: ORTHOPEDICS | Facility: CLINIC | Age: 53
End: 2023-05-12
Payer: COMMERCIAL

## 2023-05-12 VITALS
BODY MASS INDEX: 35.85 KG/M2 | SYSTOLIC BLOOD PRESSURE: 136 MMHG | HEART RATE: 88 BPM | OXYGEN SATURATION: 98 % | RESPIRATION RATE: 18 BRPM | HEIGHT: 74 IN | DIASTOLIC BLOOD PRESSURE: 86 MMHG | WEIGHT: 279.38 LBS

## 2023-05-12 DIAGNOSIS — G93.32 COVID-19 LONG HAULER MANIFESTING CHRONIC FATIGUE: ICD-10-CM

## 2023-05-12 DIAGNOSIS — R91.1 SOLITARY PULMONARY NODULE: Primary | ICD-10-CM

## 2023-05-12 DIAGNOSIS — U09.9 COVID-19 LONG HAULER MANIFESTING CHRONIC FATIGUE: ICD-10-CM

## 2023-05-12 DIAGNOSIS — M25.519 SHOULDER PAIN, UNSPECIFIED CHRONICITY, UNSPECIFIED LATERALITY: ICD-10-CM

## 2023-05-12 DIAGNOSIS — R07.9 CHEST PAIN, UNSPECIFIED TYPE: ICD-10-CM

## 2023-05-12 DIAGNOSIS — R53.83 FATIGUE, UNSPECIFIED TYPE: ICD-10-CM

## 2023-05-12 DIAGNOSIS — R91.8 LUNG NODULES: ICD-10-CM

## 2023-05-12 DIAGNOSIS — R06.02 SHORTNESS OF BREATH: ICD-10-CM

## 2023-05-12 DIAGNOSIS — R06.09 COVID-19 LONG HAULER MANIFESTING CHRONIC DYSPNEA: ICD-10-CM

## 2023-05-12 DIAGNOSIS — U09.9 COVID-19 LONG HAULER MANIFESTING CHRONIC DYSPNEA: ICD-10-CM

## 2023-05-12 DIAGNOSIS — I10 ESSENTIAL HYPERTENSION: ICD-10-CM

## 2023-05-12 PROCEDURE — 4010F ACE/ARB THERAPY RXD/TAKEN: CPT | Mod: S$GLB,,, | Performed by: STUDENT IN AN ORGANIZED HEALTH CARE EDUCATION/TRAINING PROGRAM

## 2023-05-12 PROCEDURE — 3008F BODY MASS INDEX DOCD: CPT | Mod: S$GLB,,, | Performed by: STUDENT IN AN ORGANIZED HEALTH CARE EDUCATION/TRAINING PROGRAM

## 2023-05-12 PROCEDURE — 3079F DIAST BP 80-89 MM HG: CPT | Mod: S$GLB,,, | Performed by: STUDENT IN AN ORGANIZED HEALTH CARE EDUCATION/TRAINING PROGRAM

## 2023-05-12 PROCEDURE — 1159F PR MEDICATION LIST DOCUMENTED IN MEDICAL RECORD: ICD-10-PCS | Mod: S$GLB,,, | Performed by: STUDENT IN AN ORGANIZED HEALTH CARE EDUCATION/TRAINING PROGRAM

## 2023-05-12 PROCEDURE — 99214 OFFICE O/P EST MOD 30 MIN: CPT | Mod: S$GLB,,, | Performed by: STUDENT IN AN ORGANIZED HEALTH CARE EDUCATION/TRAINING PROGRAM

## 2023-05-12 PROCEDURE — 3075F SYST BP GE 130 - 139MM HG: CPT | Mod: S$GLB,,, | Performed by: STUDENT IN AN ORGANIZED HEALTH CARE EDUCATION/TRAINING PROGRAM

## 2023-05-12 PROCEDURE — 99999 PR PBB SHADOW E&M-EST. PATIENT-LVL V: CPT | Mod: PBBFAC,,, | Performed by: STUDENT IN AN ORGANIZED HEALTH CARE EDUCATION/TRAINING PROGRAM

## 2023-05-12 PROCEDURE — 3075F PR MOST RECENT SYSTOLIC BLOOD PRESS GE 130-139MM HG: ICD-10-PCS | Mod: S$GLB,,, | Performed by: STUDENT IN AN ORGANIZED HEALTH CARE EDUCATION/TRAINING PROGRAM

## 2023-05-12 PROCEDURE — 1160F RVW MEDS BY RX/DR IN RCRD: CPT | Mod: S$GLB,,, | Performed by: STUDENT IN AN ORGANIZED HEALTH CARE EDUCATION/TRAINING PROGRAM

## 2023-05-12 PROCEDURE — 1160F PR REVIEW ALL MEDS BY PRESCRIBER/CLIN PHARMACIST DOCUMENTED: ICD-10-PCS | Mod: S$GLB,,, | Performed by: STUDENT IN AN ORGANIZED HEALTH CARE EDUCATION/TRAINING PROGRAM

## 2023-05-12 PROCEDURE — 99999 PR PBB SHADOW E&M-EST. PATIENT-LVL V: ICD-10-PCS | Mod: PBBFAC,,, | Performed by: STUDENT IN AN ORGANIZED HEALTH CARE EDUCATION/TRAINING PROGRAM

## 2023-05-12 PROCEDURE — 99214 PR OFFICE/OUTPT VISIT, EST, LEVL IV, 30-39 MIN: ICD-10-PCS | Mod: S$GLB,,, | Performed by: STUDENT IN AN ORGANIZED HEALTH CARE EDUCATION/TRAINING PROGRAM

## 2023-05-12 PROCEDURE — 3079F PR MOST RECENT DIASTOLIC BLOOD PRESSURE 80-89 MM HG: ICD-10-PCS | Mod: S$GLB,,, | Performed by: STUDENT IN AN ORGANIZED HEALTH CARE EDUCATION/TRAINING PROGRAM

## 2023-05-12 PROCEDURE — 4010F PR ACE/ARB THEARPY RXD/TAKEN: ICD-10-PCS | Mod: S$GLB,,, | Performed by: STUDENT IN AN ORGANIZED HEALTH CARE EDUCATION/TRAINING PROGRAM

## 2023-05-12 PROCEDURE — 1159F MED LIST DOCD IN RCRD: CPT | Mod: S$GLB,,, | Performed by: STUDENT IN AN ORGANIZED HEALTH CARE EDUCATION/TRAINING PROGRAM

## 2023-05-12 PROCEDURE — 3008F PR BODY MASS INDEX (BMI) DOCUMENTED: ICD-10-PCS | Mod: S$GLB,,, | Performed by: STUDENT IN AN ORGANIZED HEALTH CARE EDUCATION/TRAINING PROGRAM

## 2023-05-12 NOTE — ASSESSMENT & PLAN NOTE
Well controlled  Continue  Hypertension Medications             losartan (COZAAR) 50 MG tablet Take 1 tablet (50 mg total) by mouth once daily.

## 2023-05-12 NOTE — PROGRESS NOTES
Subjective:       Patient ID: Paolo Brower is a 52 y.o. male.    Chief Complaint: Arm Pain, Fatigue, and Shortness of Breath    Pt notes he had Covid in march  Since this time he has had chronic fatigue.  He has no energy and finds it hard to complete tasks  He has some concentration issues.  He notes chronic shortness of breath but seems worse with exertion  He will get some occasional exertional chest pains/side stitch type pains  This lasts about an hour before subsiding with rest.  He overall has tightness in his biceps and in his shoulder area.    Review of Systems   Constitutional:  Positive for appetite change (increased appetitie), fatigue and unexpected weight change (gaiing weight). Negative for activity change, chills and fever.   Respiratory:  Positive for chest tightness and shortness of breath. Negative for cough and wheezing.    Cardiovascular:  Positive for chest pain. Negative for palpitations and leg swelling.   Gastrointestinal:  Negative for abdominal pain, anal bleeding and blood in stool.   Genitourinary:  Negative for dysuria.   Integumentary:  Negative for rash.   Neurological:  Negative for dizziness, seizures, syncope and weakness.   Psychiatric/Behavioral:  Negative for dysphoric mood and sleep disturbance. The patient is not nervous/anxious.        Objective:      Physical Exam  Constitutional:       General: He is not in acute distress.     Appearance: Normal appearance. He is not ill-appearing.   Eyes:      Conjunctiva/sclera: Conjunctivae normal.   Neck:      Vascular: No carotid bruit.   Cardiovascular:      Rate and Rhythm: Normal rate and regular rhythm.      Heart sounds: Normal heart sounds. No murmur heard.  Pulmonary:      Effort: Pulmonary effort is normal. No respiratory distress.      Breath sounds: Normal breath sounds.   Abdominal:      Palpations: Abdomen is soft.   Musculoskeletal:      Right lower leg: No edema.      Left lower leg: No edema.   Skin:     General: Skin  is warm and dry.   Neurological:      Mental Status: He is alert. Mental status is at baseline.      Gait: Gait normal.   Psychiatric:         Mood and Affect: Mood normal.         Behavior: Behavior normal.         Thought Content: Thought content normal.         Judgment: Judgment normal.       Assessment:       1. Solitary pulmonary nodule    2. Lung nodules    3. Essential hypertension    4. Fatigue, unspecified type    5. Shortness of breath    6. Chest pain, unspecified type    7. Shoulder pain, unspecified chronicity, unspecified laterality    8. COVID-19 long hauler manifesting chronic dyspnea    9. COVID-19 long hauler manifesting chronic fatigue        Plan:       Problem List Items Addressed This Visit          Pulmonary    Lung nodules     Repeat CT at the 1yr chris to follow              Cardiac/Vascular    Essential hypertension     Well controlled  Continue  Hypertension Medications               losartan (COZAAR) 50 MG tablet Take 1 tablet (50 mg total) by mouth once daily.                    Other Visit Diagnoses       Solitary pulmonary nodule    -  Primary    Relevant Orders    CT Chest Lung Screening Low Dose    Ambulatory referral/consult to Pulmonology    Fatigue, unspecified type        Relevant Orders    CBC Auto Differential    Comprehensive Metabolic Panel    TSH    Hemoglobin A1C    Shortness of breath        Relevant Orders    Ambulatory referral/consult to Pulmonology    X-Ray Chest PA And Lateral    Chest pain, unspecified type        Relevant Orders    Ambulatory referral/consult to Cardiology    Shoulder pain, unspecified chronicity, unspecified laterality        Relevant Orders    Ambulatory referral/consult to Orthopedics    Ambulatory referral/consult to Physical/Occupational Therapy    COVID-19 long hauler manifesting chronic dyspnea        Relevant Orders    Ambulatory referral/consult to Pulmonology    Ambulatory referral/consult to Cardiology    X-Ray Chest PA And Lateral     COVID-19 long hauler manifesting chronic fatigue                              [place]um[place] Counseling: I discussed with the patient the risks of tildrakizumab including but not limited to immunosuppression, malignancy, posterior leukoencephalopathy syndrome, and serious infections.  The patient understands that monitoring is required including a PPD at baseline and must alert us or the primary physician if symptoms of infection or other concerning signs are noted.

## 2023-05-15 ENCOUNTER — HOSPITAL ENCOUNTER (OUTPATIENT)
Dept: RADIOLOGY | Facility: HOSPITAL | Age: 53
Discharge: HOME | End: 2023-05-15
Attending: STUDENT IN AN ORGANIZED HEALTH CARE EDUCATION/TRAINING PROGRAM
Payer: COMMERCIAL

## 2023-05-15 ENCOUNTER — TELEPHONE (OUTPATIENT)
Dept: FAMILY MEDICINE | Facility: CLINIC | Age: 53
End: 2023-05-15
Payer: COMMERCIAL

## 2023-05-15 ENCOUNTER — PATIENT MESSAGE (OUTPATIENT)
Dept: FAMILY MEDICINE | Facility: CLINIC | Age: 53
End: 2023-05-15
Payer: COMMERCIAL

## 2023-05-15 DIAGNOSIS — U09.9 COVID-19 LONG HAULER MANIFESTING CHRONIC DYSPNEA: ICD-10-CM

## 2023-05-15 DIAGNOSIS — R06.09 COVID-19 LONG HAULER MANIFESTING CHRONIC DYSPNEA: ICD-10-CM

## 2023-05-15 DIAGNOSIS — K21.9 GASTROESOPHAGEAL REFLUX DISEASE, UNSPECIFIED WHETHER ESOPHAGITIS PRESENT: Primary | ICD-10-CM

## 2023-05-15 DIAGNOSIS — R06.02 SHORTNESS OF BREATH: ICD-10-CM

## 2023-05-15 PROCEDURE — 71046 X-RAY EXAM CHEST 2 VIEWS: CPT | Mod: 26,,, | Performed by: RADIOLOGY

## 2023-05-15 PROCEDURE — 71046 XR CHEST PA AND LATERAL: ICD-10-PCS | Mod: 26,,, | Performed by: RADIOLOGY

## 2023-05-15 PROCEDURE — 71046 X-RAY EXAM CHEST 2 VIEWS: CPT | Mod: TC,PN

## 2023-05-15 RX ORDER — PANTOPRAZOLE SODIUM 40 MG/1
40 TABLET, DELAYED RELEASE ORAL DAILY
Qty: 30 TABLET | Refills: 11 | Status: SHIPPED | OUTPATIENT
Start: 2023-05-15 | End: 2024-05-14

## 2023-05-15 NOTE — TELEPHONE ENCOUNTER
[facility] Dr [facility]queda. Stated she was supposed to call something in to the pharmacy for his heart burn and constant belching

## 2023-05-15 NOTE — TELEPHONE ENCOUNTER
----- Message from Alie Ghotra sent at 5/15/2023 10:54 AM CDT -----  Contact: Self  Patient is calling in regards to his office visit on 05/12 with Dr Mosqueda. Stated she was supposed to call something in to the pharmacy for his heart burn and constant belching, but nothing was ever called in for him. Can we check with Dr Mosqueda and call pt back to advise at [phone]. Stated he uses: Thank You    [facility] [address] [place] [address], [place] - [address] [address]  Phone: [phone] Fax: [phone]

## 2023-05-15 NOTE — TELEPHONE ENCOUNTER
----- Message from Marcelle Demarco sent at 5/15/2023 11:03 AM CDT -----  Regarding: Medications Request  Walk-in patient, states provider was to call in an rx to [facility] for frequent belching. Please call the patient at [phone].

## 2023-05-16 DIAGNOSIS — M25.511 BILATERAL SHOULDER PAIN, UNSPECIFIED CHRONICITY: Primary | ICD-10-CM

## 2023-05-16 DIAGNOSIS — M25.512 BILATERAL SHOULDER PAIN, UNSPECIFIED CHRONICITY: Primary | ICD-10-CM

## 2023-05-16 PROBLEM — R73.03 PREDIABETES: Status: ACTIVE | Noted: 2023-05-16

## 2023-05-23 ENCOUNTER — CLINICAL SUPPORT (OUTPATIENT)
Dept: REHABILITATION | Facility: HOSPITAL | Age: 53
End: 2023-05-23
Payer: COMMERCIAL

## 2023-05-23 DIAGNOSIS — M25.519 SHOULDER PAIN, UNSPECIFIED CHRONICITY, UNSPECIFIED LATERALITY: ICD-10-CM

## 2023-05-23 PROCEDURE — 97165 OT EVAL LOW COMPLEX 30 MIN: CPT | Mod: PN

## 2023-05-23 NOTE — PROGRESS NOTES
[facility]  Occupational Therapy Initial Evaluation      Name: Paolo Brower  Clinic Number: [id number]    Therapy Diagnosis:   Encounter Diagnosis   Name Primary?    Shoulder pain, unspecified chronicity, unspecified laterality      Physician: Kylah Mosqueda MD    Physician Orders: Eval and Treat  Medical Diagnosis: B shoulder pain   Surgical Procedure and Date: n/a  Evaluation Date: 5/23/2023  Insurance Authorization Period Expiration: 12/31/2023  Plan of Care Certification Period: 7/30/2023  Date of Return to MD: 5/26  Visit # / Visits authorized: 1 / 12  FOTO: 1/3    Precautions:  standard     Time In: 3: 30  Time Out: 4: 15  Total Appointment Time (timed & untimed codes): 45 minutes    Subjective      Date of Onset: March 2023    History of Current Condition/Mechanism of Injury: Paolo reports: that overhead lifting is the worst motion. The symptoms began around March 2023 when he got Covid. He feels as if he has lost muscle mass in the Both arms and through the biceps. Pain goes from the shoulder joints to the elbow joints. Pt has not gotten imaging yet but is going to Dr. Harper on Friday to get x-rays.     Falls: no    Involved Side: Bilateral   Dominant Side: Ambidextrous    Mechanism of Injury: unknown  Surgical Procedure: no  Imaging: x-ray to be done; see epic     Prior Therapy: none for the shoulders    Pain:  Functional Pain Scale Rating 0-10:   2/10 on average  2/10 at best  7-8/10 at worst  Location: Shoulders to elbow, Bilateral   Description: Aching and Burning  Aggravating Factors: Night Time  Easing Factors: ibuprofen, icy hot    Occupation:    Working presently: employed  Duties: turning wrenches, opening closing valves, seldom heavy lifting     Functional Limitations/Social History:    Previous functional status includes: Independent with all ADLs.     Current Functional Status   Home/Living environment: wife and two daughters;     - 1 story home, no steps to  enter         Limitation of Functional Status as follows:   ADLs/IADLs:     - Feeding: pt is independent     - Bathing: pt is independent; some pain with drying off    - Dressing/Grooming: pt is independent    - Home Management: pt is independent     - Driving: pt is independent      Leisure: hunting; fishing     Patient's Goals for Therapy: no pain ; strengthening    Past Medical History/Physical Systems Review:   Paolo Brower  has a past medical history of Diverticular stricture, Diverticulitis, Essential hypertension, Helicobacter positive gastritis, and Known health problems: none.    Paolo Brower  has a past surgical history that includes Appendectomy; Colonoscopy (03/26/2018); Cystoscopy w/ ureteral stent placement (Bilateral, 9/12/2018); Colonoscopy (N/A, 9/17/2018); Colectomy (N/A, 9/18/2018); Colonoscopic surgical procedure (9/18/2018); Bladder repair (9/18/2018); Low anterior resection of colon (9/18/2018); Cystoscopy w/ ureteral stent removal (Bilateral, 10/31/2018); Cystoscopy w/ retrogrades (Bilateral, 10/31/2018); and Tonsillectomy.    Paolo has a current medication list which includes the following prescription(s): losartan and pantoprazole.    Review of patient's allergies indicates:  No Known Allergies     Objective      AROM is WFL in B UE    MMT:  L flexion : 5/5  R flexion : 4-/5  L extension: 5/5  R extension: 4-/5  L abduction: 5/5  R abduction: 5/5  L ER: 4-/5   L IR: 4/5  R ER: 4-/5  R IR: 4/5  L biceps: 5/5  R biceps: 5/5     Emptycan: positive on Both   Full can: positive on both     Limitation/Restriction for FOTO shoulders Survey    Therapist reviewed FOTO scores for Paolo Brower on 5/23/2023.   FOTO documents entered into [employer] - see Media section.    Limitation Score: 47%       Treatment          Patient Education and Home Exercises      Education provided:   -role of OT, goals for OT, scheduling/cancellations, insurance limitations with patient.  -Additional Education  provided: pt educated on sleep positions, limiting heavy lifting/overhead tasks    Written Home Exercises Provided: Pt will be given HEP upon next visit (still waiting x-rays for diagnosis).  Exercises were reviewed and Paolo was able to demonstrate them prior to the end of the session.    Paolo demonstrated good  understanding of the education provided.     Pt was advised to perform these exercises free of pain, and to stop performing them if pain occurs.      Assessment      Paolo Brower is a 52 y.o. male referred to outpatient occupational therapy and presents with a medical diagnosis of B shoulder pain.    Following medical record review it is determined that pt will benefit from occupational therapy services in order to maximize pain free and/or functional use of bilateral shoulders. The following goals were discussed with the patient and patient is in agreement with them as to be addressed in the treatment plan. The patient's rehab potential is Good.     Anticipated barriers to occupational therapy: none    Plan of care discussed with patient: Yes  Patient's spiritual, cultural and educational needs considered and patient is agreeable to the plan of care and goals as stated below:     The following goals were discussed with the patient and patient is in agreement with them as to be addressed in the treatment plan.     Goals:   1.Pt FOTO score will be 29% or less prior to dc  2. Pt will improve B UE strengthening to 5/5 MMT prior to dc  3. Pt will report 2/10 pain or less during light overhead tasks by dc  4. Pt will be 100% independent in HEP prior to dc    Plan     Plan of Care Certification: 5/23/2023 to 7/30/2023.     Outpatient Occupational Therapy 2 times weekly for 6 weeks to include the following interventions: Manual therapy/joint mobilizations, Modalities for pain management, Therapeutic exercises/activities., Strengthening, Electrical Modalities, and Joint Protection.    Priscilla Davila, OT

## 2023-05-25 ENCOUNTER — OFFICE VISIT (OUTPATIENT)
Dept: ORTHOPEDICS | Facility: CLINIC | Age: 53
End: 2023-05-25
Payer: COMMERCIAL

## 2023-05-25 ENCOUNTER — HOSPITAL ENCOUNTER (OUTPATIENT)
Dept: RADIOLOGY | Facility: HOSPITAL | Age: 53
Discharge: HOME | End: 2023-05-25
Attending: ORTHOPAEDIC SURGERY
Payer: COMMERCIAL

## 2023-05-25 DIAGNOSIS — M25.512 BILATERAL SHOULDER PAIN, UNSPECIFIED CHRONICITY: ICD-10-CM

## 2023-05-25 DIAGNOSIS — M25.511 BILATERAL SHOULDER PAIN, UNSPECIFIED CHRONICITY: ICD-10-CM

## 2023-05-25 DIAGNOSIS — M25.519 SHOULDER PAIN, UNSPECIFIED CHRONICITY, UNSPECIFIED LATERALITY: ICD-10-CM

## 2023-05-25 PROCEDURE — 1160F PR REVIEW ALL MEDS BY PRESCRIBER/CLIN PHARMACIST DOCUMENTED: ICD-10-PCS | Mod: S$GLB,,, | Performed by: ORTHOPAEDIC SURGERY

## 2023-05-25 PROCEDURE — 1159F PR MEDICATION LIST DOCUMENTED IN MEDICAL RECORD: ICD-10-PCS | Mod: S$GLB,,, | Performed by: ORTHOPAEDIC SURGERY

## 2023-05-25 PROCEDURE — 73030 X-RAY EXAM OF SHOULDER: CPT | Mod: 26,,, | Performed by: RADIOLOGY

## 2023-05-25 PROCEDURE — 73030 XR SHOULDER COMPLETE 2 OR MORE VIEWS BILATERAL: ICD-10-PCS | Mod: 26,,, | Performed by: RADIOLOGY

## 2023-05-25 PROCEDURE — 20610 DRAIN/INJ JOINT/BURSA W/O US: CPT | Mod: RT,S$GLB,, | Performed by: ORTHOPAEDIC SURGERY

## 2023-05-25 PROCEDURE — 99204 PR OFFICE/OUTPT VISIT, NEW, LEVL IV, 45-59 MIN: ICD-10-PCS | Mod: 25,S$GLB,, | Performed by: ORTHOPAEDIC SURGERY

## 2023-05-25 PROCEDURE — 4010F PR ACE/ARB THEARPY RXD/TAKEN: ICD-10-PCS | Mod: S$GLB,,, | Performed by: ORTHOPAEDIC SURGERY

## 2023-05-25 PROCEDURE — 4010F ACE/ARB THERAPY RXD/TAKEN: CPT | Mod: S$GLB,,, | Performed by: ORTHOPAEDIC SURGERY

## 2023-05-25 PROCEDURE — 73030 X-RAY EXAM OF SHOULDER: CPT | Mod: TC,50,PN

## 2023-05-25 PROCEDURE — 20610 LARGE JOINT ASPIRATION/INJECTION: R SUBACROMIAL BURSA: ICD-10-PCS | Mod: RT,S$GLB,, | Performed by: ORTHOPAEDIC SURGERY

## 2023-05-25 PROCEDURE — 3044F PR MOST RECENT HEMOGLOBIN A1C LEVEL <7.0%: ICD-10-PCS | Mod: S$GLB,,, | Performed by: ORTHOPAEDIC SURGERY

## 2023-05-25 PROCEDURE — 99204 OFFICE O/P NEW MOD 45 MIN: CPT | Mod: 25,S$GLB,, | Performed by: ORTHOPAEDIC SURGERY

## 2023-05-25 PROCEDURE — 3044F HG A1C LEVEL LT 7.0%: CPT | Mod: S$GLB,,, | Performed by: ORTHOPAEDIC SURGERY

## 2023-05-25 PROCEDURE — 99999 PR PBB SHADOW E&M-EST. PATIENT-LVL III: CPT | Mod: PBBFAC,,, | Performed by: ORTHOPAEDIC SURGERY

## 2023-05-25 PROCEDURE — 99999 PR PBB SHADOW E&M-EST. PATIENT-LVL III: ICD-10-PCS | Mod: PBBFAC,,, | Performed by: ORTHOPAEDIC SURGERY

## 2023-05-25 PROCEDURE — 1160F RVW MEDS BY RX/DR IN RCRD: CPT | Mod: S$GLB,,, | Performed by: ORTHOPAEDIC SURGERY

## 2023-05-25 PROCEDURE — 1159F MED LIST DOCD IN RCRD: CPT | Mod: S$GLB,,, | Performed by: ORTHOPAEDIC SURGERY

## 2023-05-25 RX ORDER — MELOXICAM 15 MG/1
15 TABLET ORAL DAILY
Qty: 30 TABLET | Refills: 1 | Status: SHIPPED | OUTPATIENT
Start: 2023-05-25 | End: 2023-07-17

## 2023-05-25 RX ORDER — TRIAMCINOLONE ACETONIDE 40 MG/ML
40 INJECTION, SUSPENSION INTRA-ARTICULAR; INTRAMUSCULAR
Status: DISCONTINUED | OUTPATIENT
Start: 2023-05-25 | End: 2023-05-25 | Stop reason: HOSPADM

## 2023-05-25 RX ADMIN — TRIAMCINOLONE ACETONIDE 40 MG: 40 INJECTION, SUSPENSION INTRA-ARTICULAR; INTRAMUSCULAR at 03:05

## 2023-05-25 NOTE — PROCEDURES
Large Joint Aspiration/Injection: R subacromial bursa    Date/Time: 5/25/2023 3:00 PM  Performed by: Young Harper MD  Authorized by: Young Harper MD     Consent Done?:  Yes (Verbal)  Indications:  Pain  Site marked: the procedure site was marked    Timeout: prior to procedure the correct patient, procedure, and site was verified    Local anesthetic: Ropivicaine.  Anesthetic total (ml):  3      Details:  Needle Size:  20 G  Ultrasonic Guidance for needle placement?: No    Approach:  Posterior  Location:  Shoulder  Site:  R subacromial bursa  Medications:  40 mg triamcinolone acetonide 40 mg/mL  Patient tolerance:  Patient tolerated the procedure well with no immediate complications

## 2023-05-25 NOTE — PROGRESS NOTES
Subjective:      Patient ID: Paolo Brower is a 52 y.o. male.    Chief Complaint: Pain of the Right Shoulder and Pain of the Left Shoulder    HPI    52-year-old male with a 3-4 year history of bilateral shoulder pain right greater than left.  He states that he felt like he may have injured his shoulder and had some rotator cuff damage at some point.  He is right-hand dominant .  Denies any radiating pain numbness or tingling.  He does however have pain with overhead activities heavy lifting or repetitive use of the shoulders.  ROS      Objective:    Ortho Exam       Constitutional:   Patient is alert  and oriented in no acute distress  HEENT:  normocephalic atraumatic; PERRL EOMI  Neck:  Supple without adenopathy  Cardiovascular:  Normal rate and rhythm  Pulmonary:  Normal respiratory effort normal chest wall expansion  Abdominal:  Nonprotuberant nondistended  Musculoskeletal:  Patient has a steady nonantalgic gait.  Neck is supple with adequate range of motion  No Lhermitte's or Spurling sign.  Adequate range of motion of the right shoulder with discomfort with full forward flexion and internal rotation.  There is a mildly positive impingement sign.  Negative Yergason's and Speed's testing.  No gross weakness however pain with strength testing against resistance there is no swelling mass or atrophy noted.    He does have a little bit of subtle weakness in external rotation on the left shoulder.  There is a normal distal neurologic and vascular examination.  Neurological:  No focal defect; cranial nerves 2-12 grossly intact  Psychiatric/behavioral:  Mood and behavior normal    X-Ray Chest PA And Lateral  Narrative: EXAMINATION:  XR CHEST PA AND LATERAL    CLINICAL HISTORY:  Shortness of breath    TECHNIQUE:  PA and lateral views of the chest were performed.    COMPARISON:  09/16/2015.    FINDINGS:  The lungs are clear.  No focal consolidation.  Heart size is normal.  Mediastinal contours  unremarkable.    Bony thorax intact.  Impression: No acute chest disease.    Electronically signed by: Guero Wiliam  Date:    05/15/2023  Time:    10:35       My Radiographs Findings:      Radiographs of both shoulders without any acute osseous abnormalities.  Assessment:       Encounter Diagnosis   Name Primary?    Shoulder pain, unspecified chronicity, unspecified laterality          Plan:         I have discussed medical condition and treatment options with him at length.  After a verbal consent and sterile prep I injected his most symptomatic right shoulder today without complication with a combination of cc of Kenalog 4 cc of lidocaine.  He is also had initial evaluation by therapy we will have him persist with that.  I will give him a trial of meloxicam GI cardiac and renal precautions were discussed follow up with me in 4-6 weeks sooner if any questions or problems.        Past Medical History:   Diagnosis Date    Diverticular stricture     Diverticulitis     Essential hypertension 05/07/2021    Helicobacter positive gastritis 4/4/2018    Known health problems: none      Past Surgical History:   Procedure Laterality Date    APPENDECTOMY      BLADDER REPAIR  9/18/2018    Procedure: REPAIR, BLADDER;  Surgeon: Rene Kinney MD;  Location: [facility] OR;  Service: General;;  COMPLEX     COLECTOMY N/A 9/18/2018    Procedure: COLECTOMY;  Surgeon: Rene Kinney MD;  Location: [facility] OR;  Service: General;  Laterality: N/A;    COLONOSCOPIC SURGICAL PROCEDURE  9/18/2018    Procedure: SURGICAL PROCEDURE, COLONOSCOPIC;  Surgeon: Rene Kinney MD;  Location: [facility] OR;  Service: General;;    COLONOSCOPY  03/26/2018    COLONOSCOPY N/A 9/17/2018    Procedure: COLONOSCOPY;  Surgeon: Rene Kinney MD;  Location: [facility] ENDO;  Service: General;  Laterality: N/A;  WITH POLYPECTOMY AND TATTOOING    CYSTOSCOPY W/ RETROGRADES Bilateral 10/31/2018    Procedure: CYSTOSCOPY, WITH RETROGRADE PYELOGRAM;   Surgeon: Charli Shaikh MD;  Location: [facility] OR;  Service: Urology;  Laterality: Bilateral;    CYSTOSCOPY W/ URETERAL STENT PLACEMENT Bilateral 2018    Procedure: CYSTOSCOPY, WITH URETERAL STENT INSERTION;  Surgeon: Charli Shaikh MD;  Location: [facility] OR;  Service: Urology;  Laterality: Bilateral;    CYSTOSCOPY W/ URETERAL STENT REMOVAL Bilateral 10/31/2018    Procedure: CYSTOSCOPY, WITH URETERAL STENT REMOVAL;  Surgeon: Charli Shaikh MD;  Location: [facility] OR;  Service: Urology;  Laterality: Bilateral;    LOW ANTERIOR RESECTION OF COLON  2018    Procedure: RESECTION, COLON, LOW ANTERIOR;  Surgeon: Rene Kinney MD;  Location: [facility] OR;  Service: General;;    TONSILLECTOMY           Current Outpatient Medications:     losartan (COZAAR) 50 MG tablet, Take 1 tablet (50 mg total) by mouth once daily., Disp: 90 tablet, Rfl: 3    pantoprazole (PROTONIX) 40 MG tablet, Take 1 tablet (40 mg total) by mouth once daily., Disp: 30 tablet, Rfl: 11    Review of patient's allergies indicates:  No Known Allergies    Family History   Problem Relation Age of Onset    Diabetes Mother     Hypertension Mother     Cancer Father      Social History     Occupational History    Not on file   Tobacco Use    Smoking status: Former     Packs/day: 1.00     Years: 32.00     Pack years: 32.00     Types: Cigarettes     Quit date: 4/15/2022     Years since quittin.1    Smokeless tobacco: Current     Last attempt to quit: 4/15/2022   Substance and Sexual Activity    Alcohol use: Yes     Comment: rarely    Drug use: No    Sexual activity: Yes     Partners: Female

## 2023-05-29 ENCOUNTER — CLINICAL SUPPORT (OUTPATIENT)
Dept: REHABILITATION | Facility: HOSPITAL | Age: 53
End: 2023-05-29
Payer: COMMERCIAL

## 2023-05-29 DIAGNOSIS — M25.511 CHRONIC PAIN OF BOTH SHOULDERS: Primary | ICD-10-CM

## 2023-05-29 DIAGNOSIS — G89.29 CHRONIC PAIN OF BOTH SHOULDERS: Primary | ICD-10-CM

## 2023-05-29 DIAGNOSIS — M25.512 CHRONIC PAIN OF BOTH SHOULDERS: Primary | ICD-10-CM

## 2023-05-29 PROCEDURE — 97530 THERAPEUTIC ACTIVITIES: CPT | Mod: PN

## 2023-05-29 PROCEDURE — 97110 THERAPEUTIC EXERCISES: CPT | Mod: PN

## 2023-05-29 NOTE — PROGRESS NOTES
"  Occupational Therapy Daily Treatment Note     Date: 5/29/2023  Name: Paolo Brower  Clinic Number: [id number]    Therapy Diagnosis:   Encounter Diagnosis   Name Primary?    Chronic pain of both shoulders Yes     Physician: Kylah Mosqueda MD      Physician Orders: Eval and Treat  Medical Diagnosis: B shoulder pain   Surgical Procedure and Date: n/a  Evaluation Date: 5/23/2023  Insurance Authorization Period Expiration: 12/31/2023  Plan of Care Certification Period: 7/30/2023  Date of Return to MD: 5/26  Visit # / Visits authorized: 2/12    Time In:2:30  Time Out: 3:30  Total Billable Time: 60 minutes    Precautions:  universal, see epic, protocol if applicable    Subjective     Pt reports: "my shoulders move good, I'm just so weak."  he was compliant with home exercise program.  Response to previous treatment:none  Functional change: weakness in b shoulders    Pain: no pain. Side:b  Location: none reported this date  Objective   Paolo received therapeutic exercises to develop strength and ROM for 45 minutes including:  Patient performed B SHOULDER THERABAND STRENGTHENING PROGRAM W/ USE OF BLUE THERABAND FOR B SHOULDER ADD, ER, IR, ROW, SCAPULAR PROTRACTION, AND DEPRESSION.  PATIENT ALSO PERFORMED UBE 10 MIN PRIOR TO STARTING STRENGTHENING.       Paolo participated in dynamic functional therapeutic activities to improve functional performance for 15  minutes, including:  Education on hep for b shoulder musculature.           Home Exercises and Education Provided     Education provided:   HEP FOR ROTATOR CUFF STRENGTHENING PROGRAM          progress towards goals   likely tx progression  rationale of rehab interventions    Written Home Exercises/Information Provided: yes.        previously issued exercises and/or other issued home therapy instructions were reviewed if still part of current tx plan as well as any issued today and Paolo was able to demonstrate them prior to the end of the session.  Paolo " demonstrated Ind w/ understanding of the HEP provided.   See EMR under patient instructions and/or media for HEP issued today or in past    Assessment             Pt would continue to benefit from skilled OT in order to address ROM, PAIN, AND/OR DECREASED FUNCTIONAL STATUS SECONDARY TO CURRENT INJURY/DEBILITATING FACTOR.     Paolo is progressing well towards his goals and there are NO updates to goals at this time unless goals noted below are different than goals on previous notes or evaluation. Pt prognosis is GOOD  Pt will continue to benefit from skilled outpatient occupational therapy to address the deficits listed in the problem list on initial evaluation to provide pt/family education and to maximize pt's level of independence in the home and community environment.     Anticipated barriers to occupational therapy: NONE    Pt's spiritual, cultural and educational needs have been considered and pt is agreeable to plan of care and goals.      Goals:   1.Pt FOTO score will be 29% or less prior to dc  2. Pt will improve B UE strengthening to 5/5 MMT prior to dc  3. Pt will report 2/10 pain or less during light overhead tasks by dc  4. Pt will be 100% independent in HEP prior to dc            Any goals met today ?  (if any met see above)    Updates/Grading for next session: prn    Plan: evaluate and tx    Dara ANGE Thompson

## 2023-06-06 ENCOUNTER — CLINICAL SUPPORT (OUTPATIENT)
Dept: REHABILITATION | Facility: HOSPITAL | Age: 53
End: 2023-06-06
Payer: COMMERCIAL

## 2023-06-06 DIAGNOSIS — M25.511 CHRONIC PAIN OF BOTH SHOULDERS: Primary | ICD-10-CM

## 2023-06-06 DIAGNOSIS — M25.512 CHRONIC PAIN OF BOTH SHOULDERS: Primary | ICD-10-CM

## 2023-06-06 DIAGNOSIS — G89.29 CHRONIC PAIN OF BOTH SHOULDERS: Primary | ICD-10-CM

## 2023-06-06 PROCEDURE — 97530 THERAPEUTIC ACTIVITIES: CPT

## 2023-06-06 PROCEDURE — 97110 THERAPEUTIC EXERCISES: CPT

## 2023-06-06 NOTE — PROGRESS NOTES
"  Occupational Therapy Daily Treatment Note     Date: 6/6/2023  Name: Paolo Brower  Clinic Number: [id number]    Therapy Diagnosis:   Encounter Diagnosis   Name Primary?    Chronic pain of both shoulders Yes     Physician: Kylah Mosqueda MD      Physician Orders: Eval and Treat  Medical Diagnosis: B shoulder pain   Surgical Procedure and Date: n/a  Evaluation Date: 5/23/2023  Insurance Authorization Period Expiration: 12/31/2023  Plan of Care Certification Period: 7/30/2023  Date of Return to MD: 5/26  Visit # / Visits authorized: 3/12    Time In:11:10  Time Out: 12:10  Total Billable Time: 60 minutes    Precautions:  universal, see epic, protocol if applicable    Subjective     Pt reports: "my shoulders move good, I'm just so weak."  he was compliant with home exercise program.  Response to previous treatment:none  Functional change: weakness in b shoulders    Pain: no pain. Side:b  Location: none reported this date  Objective   Paolo received therapeutic exercises to develop strength and ROM for 45 minutes including:  Patient performed B SHOULDER THERABAND STRENGTHENING PROGRAM W/ USE OF BLUE THERABAND FOR B SHOULDER ADD, ER, IR, ROW, SCAPULAR PROTRACTION, AND DEPRESSION.  PATIENT ALSO PERFORMED UBE 10 MIN PRIOR TO STARTING STRENGTHENING.       Paolo participated in dynamic functional therapeutic activities to improve functional performance for 15  minutes, including:  Education on hep for b shoulder musculature.           Home Exercises and Education Provided     Education provided:   HEP FOR ROTATOR CUFF STRENGTHENING PROGRAM          progress towards goals   likely tx progression  rationale of rehab interventions    Written Home Exercises/Information Provided: yes.        previously issued exercises and/or other issued home therapy instructions were reviewed if still part of current tx plan as well as any issued today and Paolo was able to demonstrate them prior to the end of the session.  Paolo " demonstrated Ind w/ understanding of the HEP provided.   See EMR under patient instructions and/or media for HEP issued today or in past    Assessment   Patient w/ some slight pain/annoying feeling of left shoulder this date.     Pt would continue to benefit from skilled OT in order to address ROM, PAIN, AND/OR DECREASED FUNCTIONAL STATUS SECONDARY TO CURRENT INJURY/DEBILITATING FACTOR.     Paolo is progressing well towards his goals and there are NO updates to goals at this time unless goals noted below are different than goals on previous notes or evaluation. Pt prognosis is GOOD  Pt will continue to benefit from skilled outpatient occupational therapy to address the deficits listed in the problem list on initial evaluation to provide pt/family education and to maximize pt's level of independence in the home and community environment.     Anticipated barriers to occupational therapy: NONE    Pt's spiritual, cultural and educational needs have been considered and pt is agreeable to plan of care and goals.      Goals:   1.Pt FOTO score will be 29% or less prior to dc  2. Pt will improve B UE strengthening to 5/5 MMT prior to dc  3. Pt will report 2/10 pain or less during light overhead tasks by dc  4. Pt will be 100% independent in HEP prior to dc            Any goals met today ?  (if any met see above)    Updates/Grading for next session: prn    Plan: evaluate and tx    ANGE Cannon

## 2023-06-08 ENCOUNTER — OFFICE VISIT (OUTPATIENT)
Dept: CARDIOLOGY | Facility: CLINIC | Age: 53
End: 2023-06-08
Payer: COMMERCIAL

## 2023-06-08 VITALS
OXYGEN SATURATION: 96 % | WEIGHT: 276.88 LBS | SYSTOLIC BLOOD PRESSURE: 130 MMHG | DIASTOLIC BLOOD PRESSURE: 87 MMHG | HEIGHT: 74 IN | BODY MASS INDEX: 35.53 KG/M2 | HEART RATE: 111 BPM

## 2023-06-08 DIAGNOSIS — F17.200 SMOKER: ICD-10-CM

## 2023-06-08 DIAGNOSIS — E78.1 HYPERTRIGLYCERIDEMIA WITHOUT HYPERCHOLESTEROLEMIA: ICD-10-CM

## 2023-06-08 DIAGNOSIS — U09.9 COVID-19 LONG HAULER MANIFESTING CHRONIC DYSPNEA: ICD-10-CM

## 2023-06-08 DIAGNOSIS — U07.1 COVID-19: ICD-10-CM

## 2023-06-08 DIAGNOSIS — J43.8 OTHER EMPHYSEMA: ICD-10-CM

## 2023-06-08 DIAGNOSIS — R06.09 COVID-19 LONG HAULER MANIFESTING CHRONIC DYSPNEA: ICD-10-CM

## 2023-06-08 DIAGNOSIS — E66.09 CLASS 2 OBESITY DUE TO EXCESS CALORIES WITHOUT SERIOUS COMORBIDITY WITH BODY MASS INDEX (BMI) OF 35.0 TO 35.9 IN ADULT: ICD-10-CM

## 2023-06-08 DIAGNOSIS — I10 ESSENTIAL HYPERTENSION: ICD-10-CM

## 2023-06-08 DIAGNOSIS — R07.9 EXERTIONAL CHEST PAIN: Primary | ICD-10-CM

## 2023-06-08 DIAGNOSIS — Z91.89 CARDIOVASCULAR EVENT RISK: ICD-10-CM

## 2023-06-08 DIAGNOSIS — R73.03 PREDIABETES: ICD-10-CM

## 2023-06-08 PROBLEM — E66.812 CLASS 2 OBESITY DUE TO EXCESS CALORIES WITHOUT SERIOUS COMORBIDITY WITH BODY MASS INDEX (BMI) OF 35.0 TO 35.9 IN ADULT: Status: ACTIVE | Noted: 2023-06-08

## 2023-06-08 PROCEDURE — 3044F PR MOST RECENT HEMOGLOBIN A1C LEVEL <7.0%: ICD-10-PCS | Mod: S$GLB,,, | Performed by: INTERNAL MEDICINE

## 2023-06-08 PROCEDURE — 3008F PR BODY MASS INDEX (BMI) DOCUMENTED: ICD-10-PCS | Mod: S$GLB,,, | Performed by: INTERNAL MEDICINE

## 2023-06-08 PROCEDURE — 1159F MED LIST DOCD IN RCRD: CPT | Mod: S$GLB,,, | Performed by: INTERNAL MEDICINE

## 2023-06-08 PROCEDURE — 3044F HG A1C LEVEL LT 7.0%: CPT | Mod: S$GLB,,, | Performed by: INTERNAL MEDICINE

## 2023-06-08 PROCEDURE — 99205 PR OFFICE/OUTPT VISIT, NEW, LEVL V, 60-74 MIN: ICD-10-PCS | Mod: S$GLB,,, | Performed by: INTERNAL MEDICINE

## 2023-06-08 PROCEDURE — 3075F SYST BP GE 130 - 139MM HG: CPT | Mod: S$GLB,,, | Performed by: INTERNAL MEDICINE

## 2023-06-08 PROCEDURE — 93000 ELECTROCARDIOGRAM COMPLETE: CPT | Mod: S$GLB,,, | Performed by: INTERNAL MEDICINE

## 2023-06-08 PROCEDURE — 93000 EKG 12-LEAD: ICD-10-PCS | Mod: S$GLB,,, | Performed by: INTERNAL MEDICINE

## 2023-06-08 PROCEDURE — 99999 PR PBB SHADOW E&M-EST. PATIENT-LVL IV: CPT | Mod: PBBFAC,,, | Performed by: INTERNAL MEDICINE

## 2023-06-08 PROCEDURE — 3079F PR MOST RECENT DIASTOLIC BLOOD PRESSURE 80-89 MM HG: ICD-10-PCS | Mod: S$GLB,,, | Performed by: INTERNAL MEDICINE

## 2023-06-08 PROCEDURE — 3079F DIAST BP 80-89 MM HG: CPT | Mod: S$GLB,,, | Performed by: INTERNAL MEDICINE

## 2023-06-08 PROCEDURE — 4010F ACE/ARB THERAPY RXD/TAKEN: CPT | Mod: S$GLB,,, | Performed by: INTERNAL MEDICINE

## 2023-06-08 PROCEDURE — 99205 OFFICE O/P NEW HI 60 MIN: CPT | Mod: S$GLB,,, | Performed by: INTERNAL MEDICINE

## 2023-06-08 PROCEDURE — 4010F PR ACE/ARB THEARPY RXD/TAKEN: ICD-10-PCS | Mod: S$GLB,,, | Performed by: INTERNAL MEDICINE

## 2023-06-08 PROCEDURE — 3008F BODY MASS INDEX DOCD: CPT | Mod: S$GLB,,, | Performed by: INTERNAL MEDICINE

## 2023-06-08 PROCEDURE — 99999 PR PBB SHADOW E&M-EST. PATIENT-LVL IV: ICD-10-PCS | Mod: PBBFAC,,, | Performed by: INTERNAL MEDICINE

## 2023-06-08 PROCEDURE — 3075F PR MOST RECENT SYSTOLIC BLOOD PRESS GE 130-139MM HG: ICD-10-PCS | Mod: S$GLB,,, | Performed by: INTERNAL MEDICINE

## 2023-06-08 PROCEDURE — 1159F PR MEDICATION LIST DOCUMENTED IN MEDICAL RECORD: ICD-10-PCS | Mod: S$GLB,,, | Performed by: INTERNAL MEDICINE

## 2023-06-08 NOTE — PROGRESS NOTES
Subjective:    Patient ID:  Paolo Brower is a 52 y.o. 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    Health literacy: high   Vaccinations: up-to-date, completed only 1 vaccination COVID, infection 3/2023  Activities: very limited after COVID, everything improved after making doctors appointments in 5/2023, back to normal activities, PT for shoulder weekly for an hour. No problem now and not limited.  Nicotine: started age 18, up to a ppd, now half ppd  Alcohol: none  Illicit drugs: none  Cardiac symptoms: had exertional CP now gone since 5/2023  Home BP: ave 140 / 89  Medication compliance: yes, do not miss  Diet: regular, little salt  Caffeine: 1 cpd  Labs:   Lab Results   Component Value Date    TSH 2.948 05/15/2023        Lab Results   Component Value Date    HGBA1C 5.7 (H) 05/15/2023       Lab Results   Component Value Date    WBC 10.52 05/15/2023    HGB 15.4 05/15/2023    HCT 44.7 05/15/2023    MCV 83 05/15/2023     05/15/2023       CMP  Sodium   Date Value Ref Range Status   05/15/2023 139 136 - 145 mmol/L Final     Potassium   Date Value Ref Range Status   05/15/2023 4.4 3.5 - 5.1 mmol/L Final     Chloride   Date Value Ref Range Status   05/15/2023 109 95 - 110 mmol/L Final     CO2   Date Value Ref Range Status   05/15/2023 21 (L) 23 - 29 mmol/L Final     Glucose   Date Value Ref Range Status   05/15/2023 102 70 - 110 mg/dL Final     BUN   Date Value Ref Range Status   05/15/2023 15 6 - 20 mg/dL Final     Creatinine   Date Value Ref Range Status   05/15/2023 1.0 0.5 - 1.4 mg/dL Final     Calcium   Date Value Ref Range Status   05/15/2023 9.6 8.7 - 10.5 mg/dL Final     Total Protein   Date Value Ref Range Status   05/15/2023 6.9 6.0 - 8.4 g/dL Final      Albumin   Date Value Ref Range Status   05/15/2023 4.1 3.5 - 5.2 g/dL Final     Total Bilirubin   Date Value Ref Range Status   05/15/2023 0.5 0.1 - 1.0 mg/dL Final     Comment:     For infants and newborns, interpretation of results should be based  on gestational age, weight and in agreement with clinical  observations.    Premature Infant recommended reference ranges:  Up to 24 hours.............<8.0 mg/dL  Up to 48 hours............<12.0 mg/dL  3-5 days..................<15.0 mg/dL  6-29 days.................<15.0 mg/dL       Alkaline Phosphatase   Date Value Ref Range Status   05/15/2023 109 55 - 135 U/L Final     AST   Date Value Ref Range Status   05/15/2023 14 10 - 40 U/L Final     ALT   Date Value Ref Range Status   05/15/2023 31 10 - 44 U/L Final     Anion Gap   Date Value Ref Range Status   05/15/2023 9 8 - 16 mmol/L Final     eGFR if    Date Value Ref Range Status   02/17/2022 >60.0 >60 mL/min/1.73 m^2 Final     eGFR if non    Date Value Ref Range Status   02/17/2022 >60.0 >60 mL/min/1.73 m^2 Final     Comment:     Calculation used to obtain the estimated glomerular filtration  rate (eGFR) is the CKD-EPI equation.        @labrcntip(troponini)@  No results found for: BNP}   Lab Results   Component Value Date    CHOL 175 05/15/2023    CHOL 177 07/14/2020     Lab Results   Component Value Date    HDL 37 (L) 05/15/2023    HDL 32 (L) 07/14/2020     Lab Results   Component Value Date    LDLCALC 94.4 05/15/2023    LDLCALC 82.0 07/14/2020     Lab Results   Component Value Date    TRIG 218 (H) 05/15/2023    TRIG 315 (H) 07/14/2020     Lab Results   Component Value Date    CHOLHDL 21.1 05/15/2023    CHOLHDL 18.1 (L) 07/14/2020      Last Echo: none  Last stress test: none  Cardiovascular angiogram: none  ECG: NSR, rate 96, LVH  Fundoscopic exam: within the past year, negative for retinopathy    WM referred for CP, fatigue, SOB post COVID in 3/2023, only received a single  "vaccination, no booster. Every improve on its own in 5/2023 and pretty much back to normal. History of HTN and active smoker, have tried smoking cessation once. No premature family history for CAD nor stroke. Overall have intermediate ASCVD 10-year event risk of 11%. LDL-C baseline is < 100 but have elevated triglyceride. Started PPI with resolution of CP but still plenty of belching.    Kylah Mosqueda MD noted 5/12/2023 "Pt notes he had Covid in march  Since this time he has had chronic fatigue.  He has no energy and finds it hard to complete tasks  He has some concentration issues.  He notes chronic shortness of breath but seems worse with exertion  He will get some occasional exertional chest pains/side stitch type pains  This lasts about an hour before subsiding with rest.  He overall has tightness in his biceps and in his shoulder area.    Chest pain, unspecified type   Ambulatory referral/consult to Cardiology    COVID-19 long hauler manifesting chronic dyspnea       Ambulatory referral/consult to Pulmonology  Ambulatory referral/consult to Cardiology     X-Ray Chest PA And Lateral" 5/2023 - The lungs are clear.  No focal consolidation.  Heart size is normal.  Mediastinal contours unremarkable.    CT chest 9/2022 - The aorta is normal in caliber without calcific plaque.  No coronary artery calcifications are noted.  There are no pleural effusions.     There are few scattered emphysematous bulla in the lungs.  Mild bronchiectasis is again noted.      Review of Systems   Constitutional: Positive for malaise/fatigue. Negative for diaphoresis, fever, night sweats and weight gain.   HENT:  Negative for nosebleeds and tinnitus.    Eyes:  Negative for visual disturbance.   Cardiovascular:  Positive for chest pain and dyspnea on exertion. Negative for claudication, cyanosis, irregular heartbeat, leg swelling, near-syncope, orthopnea, palpitations and paroxysmal nocturnal dyspnea.   Respiratory:  Negative for " cough, shortness of breath, sleep disturbances due to breathing, snoring and wheezing.         [place] score 3, awaken refreshed. Mother have sleep apnea.   Endocrine: Negative for polydipsia and polyuria.   Hematologic/Lymphatic: Does not bruise/bleed easily.   Skin:  Negative for color change, flushing, nail changes, poor wound healing and suspicious lesions.   Musculoskeletal:  Negative for arthritis, falls, gout, joint pain, joint swelling, muscle cramps, muscle weakness and myalgias.   Gastrointestinal:  Positive for flatus. Negative for heartburn, hematemesis, hematochezia, melena and nausea.   Neurological:  Negative for disturbances in coordination, excessive daytime sleepiness, dizziness, focal weakness, headaches, light-headedness, loss of balance, numbness, vertigo and weakness.   Psychiatric/Behavioral:  Negative for depression and substance abuse. The patient does not have insomnia and is not nervous/anxious.       Objective:    Physical Exam  Constitutional:       Appearance: He is well-developed.      Comments: RA O2 sat 96%   HENT:      Head: Normocephalic.   Eyes:      Conjunctiva/sclera: Conjunctivae normal.      Pupils: Pupils are equal, round, and reactive to light.   Neck:      Thyroid: No thyromegaly.      Vascular: No JVD.   Cardiovascular:      Rate and Rhythm: Regular rhythm. Tachycardia present.      Pulses: Intact distal pulses.           Carotid pulses are 1+ on the right side and 1+ on the left side.       Radial pulses are 1+ on the right side and 1+ on the left side.        Femoral pulses are 1+ on the left side.       Dorsalis pedis pulses are 1+ on the right side and 1+ on the left side.        Posterior tibial pulses are 1+ on the right side and 1+ on the left side.      Heart sounds: Normal heart sounds. No murmur heard.    No friction rub. No gallop.   Pulmonary:      Effort: Pulmonary effort is normal.      Breath sounds: No rales.      Comments: Diminished breath sounds and  "prolong expiration.  Chest:      Chest wall: No tenderness.   Abdominal:      General: Bowel sounds are normal.      Palpations: Abdomen is soft.      Tenderness: There is no abdominal tenderness.      Comments: Waist 49.5"   Musculoskeletal:         General: Normal range of motion.      Cervical back: Normal range of motion and neck supple.   Lymphadenopathy:      Cervical: No cervical adenopathy.   Skin:     General: Skin is warm and dry.      Findings: No rash.   Neurological:      Mental Status: He is alert and oriented to person, place, and time.         Assessment:       1. Exertional chest pain    2. COVID-19 long hauler manifesting chronic dyspnea    3. Hypertriglyceridemia without hypercholesterolemia    4. Other emphysema    5. Smoker, started age 18, up to 1 ppd, how half ppd    6. COVID-19, single vaccination, 3/2023, no residual now    7. Essential hypertension, dx 5/2021    8. Prediabetes    9. Class 2 obesity due to excess calories without serious comorbidity with body mass index (BMI) of 35.0 to 35.9 in adult    10. Cardiovascular event risk, ASCVD 10-year risk 11%, 2023         Plan:       Paolo was seen today for shortness of breath and chest pain.    Diagnoses and all orders for this visit:    Exertional chest pain  -     IN OFFICE EKG 12-LEAD (to [place])  -     Ambulatory referral/consult to Cardiology  -     Stress Echo Which stress agent will be used? Treadmill Exercise; Color Flow Doppler? Yes; Future    COVID-19 long hauler manifesting chronic dyspnea  -     Ambulatory referral/consult to Cardiology    Hypertriglyceridemia without hypercholesterolemia    Other emphysema    Smoker, started age 18, up to 1 ppd, how half ppd    COVID-19, single vaccination, 3/2023, no residual now    Essential hypertension, dx 5/2021  -     Microalbumin/Creatinine Ratio, Urine; Future  -     Stress Echo Which stress agent will be used? Treadmill Exercise; Color Flow Doppler? Yes; Future    Prediabetes    Class 2 " obesity due to excess calories without serious comorbidity with body mass index (BMI) of 35.0 to 35.9 in adult    Cardiovascular event risk, ASCVD 10-year risk 11%, 2023  -     Stress Echo Which stress agent will be used? Treadmill Exercise; Color Flow Doppler? Yes; Future     - All medical issues reviewed, willing to try smoking cessation again  - Due to active smoking need to be aware of warning signs of MI and stroke given, if symptoms last more than 5 minutes, stop immediately and call 911, then chew 2-4 low-dose ASA (81 mg).  - Need low carb diet and encourage 2 not fried fatty fish meal per week.    - CV status and all medications reviewed, patient acknowledge good understanding.  - Recommend healthy living: no nicotine, healthy diet and regular exercise aiming for fitness, restorative sleep and weight control  - Need good exercise program, 4 key elements: 1. Aerobic (walking, swimming, dancing, jogging, biking, etc, 2. Muscle strengthening / resistance exercise, need to do 2-3 times weekly, 3. Stretching daily, good stretch takes a whole  total minute. 4. Balance exercise daily.   - Instruction for Mediterranean diet and heart healthy exercise given.  - Check home blood pressure, 2 days weekly, do 2 readings within 5 minutes in AM and PM, keep log for review. Target resting BP is less than 130/80.   - Weigh twice weekly, try to lose 1-2 lbs per week. Target weight loss of 5%-10%.  - Highly recommend 30-60 minutes of exercise / activities daily, can have Sunday off, with 2-3 sessions of muscle strengthening weekly. A  would be very helpful.  - Recommend at least annual cardiovascular evaluation in view of patient's significant risk factors.  - Phone review / encourage use of [facility]       Total time spend including review of record prior to face-to-face visit is 60 minutes. Greater than 50% of the time was spent in counseling and coordination of care. The above assessment and plan have been  discussed at length. Referring provider's note reviewed. Labs and procedure over the last 6 months reviewed. Problem List updated. Asked to bring in all active medications / pills bottles with next visit. Will send note to referring / PCP.

## 2023-06-08 NOTE — PATIENT INSTRUCTIONS
Recommended Mediterranean dietEating Heart-Healthy Food: Using the DASH Plan  Eating for your heart doesnt have to be hard or boring. You just need to know how to make healthier choices. The DASH eating plan has been developed to help you do just that. DASH stands for Dietary Approaches to Stop Hypertension. It is a plan that has been proven to be healthier for your heart and to lower your risk for high blood pressure. It can also help lower your risk for cancer, heart disease, osteoporosis, and diabetes.  Choosing from Each Food Group  Choose foods from each of the food groups below each day. Try to get the recommended number of servings for each food group. The serving numbers are based on a diet of 2,000 calories a day. Talk to your doctor if youre unsure about your calorie needs.  Grains   Servings: 7-8 a day  A serving is:  1 slice bread  1 ounce dry cereal  half a cup cooked rice or pasta  Best choices: Whole grains and any grains high in fiber.  Vegetables   Servings: 4-5 a day  A serving is:  1 cup raw leafy vegetable  Half a cup cooked vegetable  Three-quarter cup vegetable juice  Best choices: Fresh or frozen vegetable prepared without too much added salt or fat.    Fruits   Servings: 4-5 a day  A serving is:  Three-quarter cup fruit juice  1 medium fruit  One-quarter cup dried fruit  One-half cup fresh, frozen, or canned fruit  Best choices: A variety of fresh fruits of different colors. Whole fruits are a much better choice than fruit juices.  Low-fat or Fat Free Dairy   Servings: 2-3 a day  A serving is:  8 ounces milk  1 cup yogurt  One and a half ounces cheese  Best choices: Skim or 1% milk, low-fat or fat free yogurt or buttermilk, and low-fat cheeses.       Meat, Poultry, Fish   Servings: 2 or fewer a day  A serving is:  3 ounces cooked meat, poultry, or fish  Best choices: Lean meats and fish. Trim away visible fat. Broil, roast, or boil instead of frying. Remove skin from poultry before eating.   Nuts, Seeds, Beans   Servings: 4-5 a week  A serving is:  One third cup nuts (or one and a half ounces)  2 tablespoons sunflower seeds  Half a cup cooked beans  Best choices: Dry roasted nuts with no salt added, lentils, kidney beans, garbanzo beans, and whole walls beans.    Fats and Oils   Servings: 2 a day  A serving is:  1 teaspoon vegetable oil  1 teaspoon soft margarine  1 tablespoon low-fat mayonnaise  1 teaspoon regular mayonnaise  2 tablespoons light salad dressing  1 tablespoon regular salad dressing  Best choices: Monounsaturated and polyunsaturated fats such as olive, canola, or safflower oil.  Sweets   Servings: 5 a week or fewer  A serving is:  1 tablespoon sugar, maple syrup, or honey  1 tablespoon jam or jelly  1 half-ounce jelly beans (about 15)  8 ounces lemonade  Best choices: Dried fruit can be a satisfying sweet. Choose low-fat sweets when possible. And watch your serving sizes!       Aerobic Exercise for a Healthy Heart  Exercise is a lot more than an energy booster and a stress reliever. It also strengthens your heart muscle, lowers your blood pressure and blood cholesterol, and burns calories.      Remember, some activity is better than none.     Choose an Aerobic Activity  Choose a nonstop activity that makes your heart and lungs work harder than they do when you rest or walk normally. This aerobic exercise can improve the way your heart and other muscles use oxygen. Make it fun by exercising with a friend and choosing an activity you enjoy. Here are some ideas:  Walking  Swimming  Bicycling  Stair climbing  Dancing  Jogging  Exercise Regularly  If you havent been exercising regularly,  get your doctors okay first. Then start slowly.  Here are some tips:  Begin exercising 3 times a week for 5-10 minutes at a time.  When you feel comfortable, add a few minutes each week.  Slowly build up to exercising 3-4 times each week for 20-40 minutes. Aim for a total of 150 or more minutes a  week.  Be sure to carry your nitroglycerin with you when you exercise.  If you get angina when youre exercising, stop what youre doing, take your nitroglycerin, and call your doctor.  © 0550-7405 [employer], [address] [place] [address], [place] [address]. All rights reserved. This information is not intended as a substitute for professional medical care. Always follow your healthcare professional's instructions.    Losing Weight (Cardiovascular)  Excess weight is a major risk factor for heart disease. Losing weight may help keep your arteries open so that your heart can get the oxygen-rich blood it needs. Weight loss can also help lower your blood pressure and reduce your risk for diabetes. All in all, losing weight makes you healthier.          Exercise with a friend. When activity is fun, you're more likely to stick with it.        Calories and Weight Loss  Calories are the fuel your body burns for energy. You get the calories you need from the food you eat. For healthy weight loss, women should eat at least 1,200 calories a day, men at least 1,500.    When you eat more calories than you need, your body stores the extra calories as fat. One pound of fat equals 3,500 calories.    To lose weight, try to burn 500 calories a day more than you eat. To do this, eat 250 calories less each day. Add activity to burn the other 250 calories. Walking 21/2 miles burns about 250 calories.    Eat a variety of healthy foods. Its the best way to make calories count.     Tips for losing weight:  Drink 8 to 10 glasses of water a day.    Dont skip meals. Instead, eat smaller portions.       Brisk Activity Is Best  Brisk activity gets your heart pumping faster. It makes your heart healthier. Its also the best way to burn calories. In fact, your body may keep burning calories for hours after you stop a brisk activity.    Begin by walking 10 minutes most days.    Add more time and speed to your walk. Build up as you feel  able.    Try to walk briskly at least 30 minutes most days. If needed, you can break this into 2 shorter sessions.     Check off the ideas below that you could try to make your day more active:    Take the stairs instead of the elevator.    Park your car farther away and walk.    Ride a bike to work or to the store.    Walk laps around the mall.

## 2023-06-13 ENCOUNTER — CLINICAL SUPPORT (OUTPATIENT)
Dept: REHABILITATION | Facility: HOSPITAL | Age: 53
End: 2023-06-13
Payer: COMMERCIAL

## 2023-06-13 DIAGNOSIS — M25.511 CHRONIC PAIN OF BOTH SHOULDERS: Primary | ICD-10-CM

## 2023-06-13 DIAGNOSIS — M25.512 CHRONIC PAIN OF BOTH SHOULDERS: Primary | ICD-10-CM

## 2023-06-13 DIAGNOSIS — G89.29 CHRONIC PAIN OF BOTH SHOULDERS: Primary | ICD-10-CM

## 2023-06-13 PROCEDURE — 97110 THERAPEUTIC EXERCISES: CPT

## 2023-06-13 PROCEDURE — 97530 THERAPEUTIC ACTIVITIES: CPT

## 2023-06-13 NOTE — PROGRESS NOTES
"  Occupational Therapy Daily Treatment Note     Date: 6/13/2023  Name: Paolo Brower  Clinic Number: [id number]    Therapy Diagnosis:   Encounter Diagnosis   Name Primary?    Chronic pain of both shoulders Yes     Physician: Kylah Mosqueda MD      Physician Orders: Eval and Treat  Medical Diagnosis: B shoulder pain   Surgical Procedure and Date: n/a  Evaluation Date: 5/23/2023  Insurance Authorization Period Expiration: 12/31/2023  Plan of Care Certification Period: 7/30/2023  Date of Return to MD: 5/26  Visit # / Visits authorized: 4/12    Time In:12:30  Time Out: 1:30  Total Billable Time: 60 minutes    Precautions:  universal, see epic, protocol if applicable    Subjective     Pt reports: " I did all my program and I am doing so much better. "  he was compliant with home exercise program.  Response to previous treatment:none  Functional change: weakness in b shoulders    Pain: no pain. Side:b  Location: none reported this date  Objective   Paolo received therapeutic exercises to develop strength and ROM for 45 minutes including:  Patient performed B SHOULDER THERABAND STRENGTHENING PROGRAM W/ USE OF #8 dumbell and 5# dumbell for all ranges of shoulder and elbow movement.  Feel patient has reached his max level of potential and is now moving within a pain free range during all resistance exercises.   PATIENT ALSO PERFORMED UBE 10 MIN PRIOR TO STARTING STRENGTHENING.       Paolo participated in dynamic functional therapeutic activities to improve functional performance for 15  minutes, including:  Education on hep for b shoulder musculature.           Home Exercises and Education Provided     Education provided:   HEP FOR ROTATOR CUFF STRENGTHENING PROGRAM          progress towards goals   likely tx progression  rationale of rehab interventions    Written Home Exercises/Information Provided: yes.        previously issued exercises and/or other issued home therapy instructions were reviewed if still part of " current tx plan as well as any issued today and Paolo was able to demonstrate them prior to the end of the session.  Paolo demonstrated Ind w/ understanding of the HEP provided.   See EMR under patient instructions and/or media for HEP issued today or in past    Assessment   Patient w/ some slight pain/annoying feeling of left shoulder this date.     Pt would continue to benefit from skilled OT in order to address ROM, PAIN, AND/OR DECREASED FUNCTIONAL STATUS SECONDARY TO CURRENT INJURY/DEBILITATING FACTOR.     Paolo is progressing well towards his goals and there are NO updates to goals at this time unless goals noted below are different than goals on previous notes or evaluation. Pt prognosis is GOOD  Pt will continue to benefit from skilled outpatient occupational therapy to address the deficits listed in the problem list on initial evaluation to provide pt/family education and to maximize pt's level of independence in the home and community environment.     Anticipated barriers to occupational therapy: NONE    Pt's spiritual, cultural and educational needs have been considered and pt is agreeable to plan of care and goals.      Goals:   1.Pt FOTO score will be 29% or less prior to dc  2. Pt will improve B UE strengthening to 5/5 MMT prior to dc  3. Pt will report 2/10 pain or less during light overhead tasks by dc  4. Pt will be 100% independent in HEP prior to dc            Any goals met today ?  (if any met see above)    Updates/Grading for next session: prn    Plan: evaluate and tx    ANGE Cannon

## 2023-06-14 ENCOUNTER — OFFICE VISIT (OUTPATIENT)
Dept: FAMILY MEDICINE | Facility: CLINIC | Age: 53
End: 2023-06-14
Payer: COMMERCIAL

## 2023-06-14 ENCOUNTER — LAB VISIT (OUTPATIENT)
Dept: LAB | Facility: HOSPITAL | Age: 53
End: 2023-06-14
Attending: INTERNAL MEDICINE
Payer: COMMERCIAL

## 2023-06-14 VITALS
DIASTOLIC BLOOD PRESSURE: 80 MMHG | WEIGHT: 274.25 LBS | BODY MASS INDEX: 35.2 KG/M2 | HEART RATE: 68 BPM | HEIGHT: 74 IN | OXYGEN SATURATION: 97 % | RESPIRATION RATE: 15 BRPM | SYSTOLIC BLOOD PRESSURE: 122 MMHG

## 2023-06-14 DIAGNOSIS — R91.8 LUNG NODULES: Primary | ICD-10-CM

## 2023-06-14 DIAGNOSIS — M25.511 CHRONIC PAIN OF BOTH SHOULDERS: ICD-10-CM

## 2023-06-14 DIAGNOSIS — K21.9 GASTROESOPHAGEAL REFLUX DISEASE, UNSPECIFIED WHETHER ESOPHAGITIS PRESENT: ICD-10-CM

## 2023-06-14 DIAGNOSIS — R14.2 BELCHING: ICD-10-CM

## 2023-06-14 DIAGNOSIS — R73.03 PREDIABETES: ICD-10-CM

## 2023-06-14 DIAGNOSIS — G89.29 CHRONIC PAIN OF BOTH SHOULDERS: ICD-10-CM

## 2023-06-14 DIAGNOSIS — J43.8 OTHER EMPHYSEMA: ICD-10-CM

## 2023-06-14 DIAGNOSIS — M25.512 CHRONIC PAIN OF BOTH SHOULDERS: ICD-10-CM

## 2023-06-14 DIAGNOSIS — R06.02 SHORTNESS OF BREATH: ICD-10-CM

## 2023-06-14 DIAGNOSIS — I10 ESSENTIAL HYPERTENSION: ICD-10-CM

## 2023-06-14 LAB
ALBUMIN/CREAT UR: 7.2 UG/MG (ref 0–30)
CREAT UR-MCNC: 83 MG/DL (ref 23–375)
MICROALBUMIN UR DL<=1MG/L-MCNC: 6 UG/ML

## 2023-06-14 PROCEDURE — 99999 PR PBB SHADOW E&M-EST. PATIENT-LVL IV: CPT | Mod: PBBFAC,,, | Performed by: STUDENT IN AN ORGANIZED HEALTH CARE EDUCATION/TRAINING PROGRAM

## 2023-06-14 PROCEDURE — 4010F ACE/ARB THERAPY RXD/TAKEN: CPT | Mod: S$GLB,,, | Performed by: STUDENT IN AN ORGANIZED HEALTH CARE EDUCATION/TRAINING PROGRAM

## 2023-06-14 PROCEDURE — 1160F RVW MEDS BY RX/DR IN RCRD: CPT | Mod: S$GLB,,, | Performed by: STUDENT IN AN ORGANIZED HEALTH CARE EDUCATION/TRAINING PROGRAM

## 2023-06-14 PROCEDURE — 82570 ASSAY OF URINE CREATININE: CPT | Performed by: INTERNAL MEDICINE

## 2023-06-14 PROCEDURE — 3074F PR MOST RECENT SYSTOLIC BLOOD PRESSURE < 130 MM HG: ICD-10-PCS | Mod: S$GLB,,, | Performed by: STUDENT IN AN ORGANIZED HEALTH CARE EDUCATION/TRAINING PROGRAM

## 2023-06-14 PROCEDURE — 3079F DIAST BP 80-89 MM HG: CPT | Mod: S$GLB,,, | Performed by: STUDENT IN AN ORGANIZED HEALTH CARE EDUCATION/TRAINING PROGRAM

## 2023-06-14 PROCEDURE — 4010F PR ACE/ARB THEARPY RXD/TAKEN: ICD-10-PCS | Mod: S$GLB,,, | Performed by: STUDENT IN AN ORGANIZED HEALTH CARE EDUCATION/TRAINING PROGRAM

## 2023-06-14 PROCEDURE — 99214 OFFICE O/P EST MOD 30 MIN: CPT | Mod: S$GLB,,, | Performed by: STUDENT IN AN ORGANIZED HEALTH CARE EDUCATION/TRAINING PROGRAM

## 2023-06-14 PROCEDURE — 3074F SYST BP LT 130 MM HG: CPT | Mod: S$GLB,,, | Performed by: STUDENT IN AN ORGANIZED HEALTH CARE EDUCATION/TRAINING PROGRAM

## 2023-06-14 PROCEDURE — 99214 PR OFFICE/OUTPT VISIT, EST, LEVL IV, 30-39 MIN: ICD-10-PCS | Mod: S$GLB,,, | Performed by: STUDENT IN AN ORGANIZED HEALTH CARE EDUCATION/TRAINING PROGRAM

## 2023-06-14 PROCEDURE — 3008F PR BODY MASS INDEX (BMI) DOCUMENTED: ICD-10-PCS | Mod: S$GLB,,, | Performed by: STUDENT IN AN ORGANIZED HEALTH CARE EDUCATION/TRAINING PROGRAM

## 2023-06-14 PROCEDURE — 1159F PR MEDICATION LIST DOCUMENTED IN MEDICAL RECORD: ICD-10-PCS | Mod: S$GLB,,, | Performed by: STUDENT IN AN ORGANIZED HEALTH CARE EDUCATION/TRAINING PROGRAM

## 2023-06-14 PROCEDURE — 3008F BODY MASS INDEX DOCD: CPT | Mod: S$GLB,,, | Performed by: STUDENT IN AN ORGANIZED HEALTH CARE EDUCATION/TRAINING PROGRAM

## 2023-06-14 PROCEDURE — 3079F PR MOST RECENT DIASTOLIC BLOOD PRESSURE 80-89 MM HG: ICD-10-PCS | Mod: S$GLB,,, | Performed by: STUDENT IN AN ORGANIZED HEALTH CARE EDUCATION/TRAINING PROGRAM

## 2023-06-14 PROCEDURE — 99999 PR PBB SHADOW E&M-EST. PATIENT-LVL IV: ICD-10-PCS | Mod: PBBFAC,,, | Performed by: STUDENT IN AN ORGANIZED HEALTH CARE EDUCATION/TRAINING PROGRAM

## 2023-06-14 PROCEDURE — 1159F MED LIST DOCD IN RCRD: CPT | Mod: S$GLB,,, | Performed by: STUDENT IN AN ORGANIZED HEALTH CARE EDUCATION/TRAINING PROGRAM

## 2023-06-14 PROCEDURE — 3044F HG A1C LEVEL LT 7.0%: CPT | Mod: S$GLB,,, | Performed by: STUDENT IN AN ORGANIZED HEALTH CARE EDUCATION/TRAINING PROGRAM

## 2023-06-14 PROCEDURE — 3044F PR MOST RECENT HEMOGLOBIN A1C LEVEL <7.0%: ICD-10-PCS | Mod: S$GLB,,, | Performed by: STUDENT IN AN ORGANIZED HEALTH CARE EDUCATION/TRAINING PROGRAM

## 2023-06-14 PROCEDURE — 1160F PR REVIEW ALL MEDS BY PRESCRIBER/CLIN PHARMACIST DOCUMENTED: ICD-10-PCS | Mod: S$GLB,,, | Performed by: STUDENT IN AN ORGANIZED HEALTH CARE EDUCATION/TRAINING PROGRAM

## 2023-06-14 NOTE — PATIENT INSTRUCTIONS

## 2023-06-14 NOTE — PROGRESS NOTES
Subjective:       Patient ID: Paolo Brower is a 52 y.o. male.    Chief Complaint: Follow-up    [place]  He is doing well.  Breathing is stable but still short winded  Continued belching and discomfort. A bit better better with medication.  Saw cardiology and orthopedics.  Shoulder is doing better.   Labs revealed prediabetes.  He is working on diet and exercise.    Review of Systems   Constitutional:  Negative for activity change and appetite change.   Respiratory:  Negative for shortness of breath.    Cardiovascular:  Negative for chest pain.   Gastrointestinal:  Positive for reflux. Negative for abdominal pain, change in bowel habit and change in bowel habit.   Genitourinary:  Negative for dysuria.   Integumentary:  Negative for rash.   Psychiatric/Behavioral:  Negative for dysphoric mood and sleep disturbance. The patient is not nervous/anxious.        Objective:      Physical Exam  Constitutional:       General: He is not in acute distress.     Appearance: Normal appearance. He is not ill-appearing.   Eyes:      Conjunctiva/sclera: Conjunctivae normal.   Cardiovascular:      Rate and Rhythm: Normal rate and regular rhythm.      Heart sounds: Normal heart sounds. No murmur heard.  Pulmonary:      Effort: Pulmonary effort is normal. No respiratory distress.      Breath sounds: Normal breath sounds.   Musculoskeletal:      Right lower leg: No edema.      Left lower leg: No edema.   Skin:     General: Skin is warm and dry.   Neurological:      Mental Status: He is alert. Mental status is at baseline.      Gait: Gait normal.   Psychiatric:         Mood and Affect: Mood normal.         Behavior: Behavior normal.         Thought Content: Thought content normal.         Judgment: Judgment normal.       Assessment:       1. Lung nodules    2. Other emphysema    3. Prediabetes    4. Belching    5. Shortness of breath    6. Chronic pain of both shoulders    7. Gastroesophageal reflux disease, unspecified whether  esophagitis present        Plan:       Problem List Items Addressed This Visit          Pulmonary    Lung nodules - Primary     CT chest not done yet.  Awaiting these results         Other emphysema     Pulmonary referral in place         Relevant Orders    Ambulatory referral/consult to Pulmonology       Endocrine    Prediabetes     Lab Results   Component Value Date    HGBA1C 5.7 (H) 05/15/2023   Work on diet and activity changes.  Consider medication.            Orthopedic    Shoulder pain     Doing much better.          Other Visit Diagnoses       Belching        Relevant Orders    US Abdomen Limited_Gallbladder    Ambulatory referral/consult to Gastroenterology    Shortness of breath        Relevant Orders    Ambulatory referral/consult to Pulmonology    Gastroesophageal reflux disease, unspecified whether esophagitis present        Relevant Orders    Ambulatory referral/consult to Gastroenterology

## 2023-06-14 NOTE — ASSESSMENT & PLAN NOTE
Lab Results   Component Value Date    HGBA1C 5.7 (H) 05/15/2023     Work on diet and activity changes.  Consider medication.

## 2023-06-29 ENCOUNTER — PATIENT MESSAGE (OUTPATIENT)
Dept: GASTROENTEROLOGY | Facility: CLINIC | Age: 53
End: 2023-06-29
Payer: COMMERCIAL

## 2023-07-03 ENCOUNTER — HOSPITAL ENCOUNTER (OUTPATIENT)
Dept: CARDIOLOGY | Facility: HOSPITAL | Age: 53
Discharge: HOME | End: 2023-07-03
Attending: INTERNAL MEDICINE
Payer: COMMERCIAL

## 2023-07-03 VITALS
WEIGHT: 274 LBS | SYSTOLIC BLOOD PRESSURE: 174 MMHG | DIASTOLIC BLOOD PRESSURE: 77 MMHG | BODY MASS INDEX: 35.16 KG/M2 | HEART RATE: 88 BPM | HEIGHT: 74 IN

## 2023-07-03 DIAGNOSIS — I10 ESSENTIAL HYPERTENSION: ICD-10-CM

## 2023-07-03 DIAGNOSIS — Z91.89 CARDIOVASCULAR EVENT RISK: ICD-10-CM

## 2023-07-03 DIAGNOSIS — R07.9 EXERTIONAL CHEST PAIN: ICD-10-CM

## 2023-07-03 LAB
AORTIC ROOT ANNULUS: 3.66 CM
AORTIC VALVE CUSP SEPERATION: 2.4 CM
ASCENDING AORTA: 2.93 CM
AV INDEX (PROSTH): 0.81
AV MEAN GRADIENT: 4 MMHG
AV PEAK GRADIENT: 7 MMHG
AV VALVE AREA: 3.47 CM2
AV VELOCITY RATIO: 0.77
BSA FOR ECHO PROCEDURE: 2.55 M2
CV ECHO LV RWT: 0.55 CM
CV STRESS BASE HR: 81 BPM
DIASTOLIC BLOOD PRESSURE: 102 MMHG
DOP CALC AO PEAK VEL: 1.31 M/S
DOP CALC AO VTI: 27.5 CM
DOP CALC LVOT AREA: 4.3 CM2
DOP CALC LVOT DIAMETER: 2.34 CM
DOP CALC LVOT PEAK VEL: 1.01 M/S
DOP CALC LVOT STROKE VOLUME: 95.42 CM3
DOP CALC MV VTI: 24.2 CM
DOP CALCLVOT PEAK VEL VTI: 22.2 CM
E WAVE DECELERATION TIME: 157.6 MSEC
E/A RATIO: 1.04
E/E' RATIO: 4.35 M/S
ECHO LV POSTERIOR WALL: 1.21 CM (ref 0.6–1.1)
EJECTION FRACTION: 61 %
FRACTIONAL SHORTENING: 32 % (ref 28–44)
INTERVENTRICULAR SEPTUM: 1.2 CM (ref 0.6–1.1)
IVC DIAMETER: 1.59 CM
IVRT: 79.92 MSEC
LA MAJOR: 4.24 CM
LA MINOR: 3.21 CM
LEFT ATRIUM SIZE: 4.31 CM
LEFT INTERNAL DIMENSION IN SYSTOLE: 2.96 CM (ref 2.1–4)
LEFT VENTRICLE DIASTOLIC VOLUME INDEX: 34.92 ML/M2
LEFT VENTRICLE DIASTOLIC VOLUME: 86.61 ML
LEFT VENTRICLE MASS INDEX: 77 G/M2
LEFT VENTRICLE SYSTOLIC VOLUME INDEX: 13.6 ML/M2
LEFT VENTRICLE SYSTOLIC VOLUME: 33.79 ML
LEFT VENTRICULAR INTERNAL DIMENSION IN DIASTOLE: 4.38 CM (ref 3.5–6)
LEFT VENTRICULAR MASS: 191.14 G
LV LATERAL E/E' RATIO: 5 M/S
LV SEPTAL E/E' RATIO: 3.85 M/S
LVOT MG: 1.81 MMHG
LVOT MV: 0.6 CM/S
MV MEAN GRADIENT: 1 MMHG
MV PEAK A VEL: 0.48 M/S
MV PEAK E VEL: 0.5 M/S
MV PEAK GRADIENT: 2 MMHG
MV STENOSIS PRESSURE HALF TIME: 46.42 MS
MV VALVE AREA BY CONTINUITY EQUATION: 3.94 CM2
MV VALVE AREA P 1/2 METHOD: 4.74 CM2
OHS CV CPX 1 MINUTE RECOVERY HEART RATE: 111 BPM
OHS CV CPX 85 PERCENT MAX PREDICTED HEART RATE MALE: 144
OHS CV CPX ESTIMATED METS: 13
OHS CV CPX MAX PREDICTED HEART RATE: 169
OHS CV CPX PATIENT IS FEMALE: 0
OHS CV CPX PATIENT IS MALE: 1
OHS CV CPX PEAK DIASTOLIC BLOOD PRESSURE: 105 MMHG
OHS CV CPX PEAK HEAR RATE: 146 BPM
OHS CV CPX PEAK RATE PRESSURE PRODUCT: ABNORMAL
OHS CV CPX PEAK SYSTOLIC BLOOD PRESSURE: 216 MMHG
OHS CV CPX PERCENT MAX PREDICTED HEART RATE ACHIEVED: 86
OHS CV CPX RATE PRESSURE PRODUCT PRESENTING: ABNORMAL
PISA MRMAX VEL: 2.65 M/S
PISA TR MAX VEL: 1.56 M/S
PULM VEIN S/D RATIO: 0.85
PV MEAN GRADIENT: 2 MMHG
PV MV: 0.65 M/S
PV PEAK D VEL: 0.66 M/S
PV PEAK S VEL: 0.56 M/S
PV PEAK VELOCITY: 0.87 CM/S
RA MAJOR: 4.04 CM
RA PRESSURE: 3 MMHG
RA WIDTH: 3.38 CM
RIGHT VENTRICULAR END-DIASTOLIC DIMENSION: 3.29 CM
RIGHT VENTRICULAR LENGTH IN DIASTOLE (APICAL 4-CHAMBER VIEW): 6.02 CM
RV MID DIAMA: 1.46 CM
STJ: 3.03 CM
STRESS ECHO POST EXERCISE DUR MIN: 7 MINUTES
STRESS ECHO POST EXERCISE DUR SEC: 38 SECONDS
SYSTOLIC BLOOD PRESSURE: 148 MMHG
TDI LATERAL: 0.1 M/S
TDI SEPTAL: 0.13 M/S
TDI: 0.12 M/S
TR MAX PG: 10 MMHG
TRICUSPID VALVE PEAK A WAVE VELOCITY: 0.28 M/S
TV PEAK E VEL: 0.5 M/S
TV REST PULMONARY ARTERY PRESSURE: 13 MMHG

## 2023-07-03 PROCEDURE — 93325 DOPPLER ECHO COLOR FLOW MAPG: CPT | Mod: 26,,, | Performed by: INTERNAL MEDICINE

## 2023-07-03 PROCEDURE — 93320 DOPPLER ECHO COMPLETE: CPT | Mod: 26,,, | Performed by: INTERNAL MEDICINE

## 2023-07-03 PROCEDURE — 93351 STRESS TTE COMPLETE: CPT | Mod: 26,,, | Performed by: INTERNAL MEDICINE

## 2023-07-03 PROCEDURE — 93351 STRESS TTE COMPLETE: CPT

## 2023-07-03 PROCEDURE — 93351 STRESS ECHO (CUPID ONLY): ICD-10-PCS | Mod: 26,,, | Performed by: INTERNAL MEDICINE

## 2023-07-03 PROCEDURE — 93320 STRESS ECHO (CUPID ONLY): ICD-10-PCS | Mod: 26,,, | Performed by: INTERNAL MEDICINE

## 2023-07-03 PROCEDURE — 93325 STRESS ECHO (CUPID ONLY): ICD-10-PCS | Mod: 26,,, | Performed by: INTERNAL MEDICINE

## 2023-07-03 NOTE — NURSING NOTE
2 patient identifier name and date of birth confirmed as stated by patient and matched with armband. Informed consent obtained. Dr Sesay here.

## 2023-07-05 ENCOUNTER — OFFICE VISIT (OUTPATIENT)
Dept: PULMONOLOGY | Facility: CLINIC | Age: 53
End: 2023-07-05
Payer: COMMERCIAL

## 2023-07-05 VITALS
SYSTOLIC BLOOD PRESSURE: 124 MMHG | DIASTOLIC BLOOD PRESSURE: 81 MMHG | HEIGHT: 74 IN | WEIGHT: 267.44 LBS | OXYGEN SATURATION: 98 % | HEART RATE: 76 BPM | BODY MASS INDEX: 34.32 KG/M2

## 2023-07-05 DIAGNOSIS — K76.0 FATTY LIVER: ICD-10-CM

## 2023-07-05 DIAGNOSIS — R06.02 SHORTNESS OF BREATH: ICD-10-CM

## 2023-07-05 DIAGNOSIS — R91.8 LUNG NODULES: ICD-10-CM

## 2023-07-05 DIAGNOSIS — F17.210 NICOTINE DEPENDENCE, CIGARETTES, UNCOMPLICATED: Primary | ICD-10-CM

## 2023-07-05 DIAGNOSIS — J43.8 OTHER EMPHYSEMA: ICD-10-CM

## 2023-07-05 DIAGNOSIS — U09.9 LONG COVID: ICD-10-CM

## 2023-07-05 PROBLEM — F17.211 NICOTINE DEPENDENCE, CIGARETTES, IN REMISSION: Status: ACTIVE | Noted: 2023-07-05

## 2023-07-05 PROCEDURE — 3008F PR BODY MASS INDEX (BMI) DOCUMENTED: ICD-10-PCS | Mod: CPTII,S$GLB,, | Performed by: INTERNAL MEDICINE

## 2023-07-05 PROCEDURE — 1159F PR MEDICATION LIST DOCUMENTED IN MEDICAL RECORD: ICD-10-PCS | Mod: CPTII,S$GLB,, | Performed by: INTERNAL MEDICINE

## 2023-07-05 PROCEDURE — 1159F MED LIST DOCD IN RCRD: CPT | Mod: CPTII,S$GLB,, | Performed by: INTERNAL MEDICINE

## 2023-07-05 PROCEDURE — 4010F ACE/ARB THERAPY RXD/TAKEN: CPT | Mod: CPTII,S$GLB,, | Performed by: INTERNAL MEDICINE

## 2023-07-05 PROCEDURE — 3066F PR DOCUMENTATION OF TREATMENT FOR NEPHROPATHY: ICD-10-PCS | Mod: CPTII,S$GLB,, | Performed by: INTERNAL MEDICINE

## 2023-07-05 PROCEDURE — 3061F NEG MICROALBUMINURIA REV: CPT | Mod: CPTII,S$GLB,, | Performed by: INTERNAL MEDICINE

## 2023-07-05 PROCEDURE — 99999 PR PBB SHADOW E&M-EST. PATIENT-LVL IV: CPT | Mod: PBBFAC,,, | Performed by: INTERNAL MEDICINE

## 2023-07-05 PROCEDURE — 3074F PR MOST RECENT SYSTOLIC BLOOD PRESSURE < 130 MM HG: ICD-10-PCS | Mod: CPTII,S$GLB,, | Performed by: INTERNAL MEDICINE

## 2023-07-05 PROCEDURE — 99204 PR OFFICE/OUTPT VISIT, NEW, LEVL IV, 45-59 MIN: ICD-10-PCS | Mod: S$GLB,,, | Performed by: INTERNAL MEDICINE

## 2023-07-05 PROCEDURE — 3061F PR NEG MICROALBUMINURIA RESULT DOCUMENTED/REVIEW: ICD-10-PCS | Mod: CPTII,S$GLB,, | Performed by: INTERNAL MEDICINE

## 2023-07-05 PROCEDURE — 3066F NEPHROPATHY DOC TX: CPT | Mod: CPTII,S$GLB,, | Performed by: INTERNAL MEDICINE

## 2023-07-05 PROCEDURE — 3074F SYST BP LT 130 MM HG: CPT | Mod: CPTII,S$GLB,, | Performed by: INTERNAL MEDICINE

## 2023-07-05 PROCEDURE — 99204 OFFICE O/P NEW MOD 45 MIN: CPT | Mod: S$GLB,,, | Performed by: INTERNAL MEDICINE

## 2023-07-05 PROCEDURE — 3079F PR MOST RECENT DIASTOLIC BLOOD PRESSURE 80-89 MM HG: ICD-10-PCS | Mod: CPTII,S$GLB,, | Performed by: INTERNAL MEDICINE

## 2023-07-05 PROCEDURE — 99999 PR PBB SHADOW E&M-EST. PATIENT-LVL IV: ICD-10-PCS | Mod: PBBFAC,,, | Performed by: INTERNAL MEDICINE

## 2023-07-05 PROCEDURE — 3008F BODY MASS INDEX DOCD: CPT | Mod: CPTII,S$GLB,, | Performed by: INTERNAL MEDICINE

## 2023-07-05 PROCEDURE — 4010F PR ACE/ARB THEARPY RXD/TAKEN: ICD-10-PCS | Mod: CPTII,S$GLB,, | Performed by: INTERNAL MEDICINE

## 2023-07-05 PROCEDURE — 3044F PR MOST RECENT HEMOGLOBIN A1C LEVEL <7.0%: ICD-10-PCS | Mod: CPTII,S$GLB,, | Performed by: INTERNAL MEDICINE

## 2023-07-05 PROCEDURE — 3044F HG A1C LEVEL LT 7.0%: CPT | Mod: CPTII,S$GLB,, | Performed by: INTERNAL MEDICINE

## 2023-07-05 PROCEDURE — 3079F DIAST BP 80-89 MM HG: CPT | Mod: CPTII,S$GLB,, | Performed by: INTERNAL MEDICINE

## 2023-07-05 RX ORDER — BUPROPION HYDROCHLORIDE 150 MG/1
300 TABLET ORAL DAILY
Qty: 60 TABLET | Refills: 2 | Status: SHIPPED | OUTPATIENT
Start: 2023-07-05 | End: 2024-07-04

## 2023-07-05 RX ORDER — FLUTICASONE FUROATE, UMECLIDINIUM BROMIDE AND VILANTEROL TRIFENATATE 200; 62.5; 25 UG/1; UG/1; UG/1
1 POWDER RESPIRATORY (INHALATION) DAILY
Qty: 60 EACH | Refills: 11 | Status: ON HOLD | OUTPATIENT
Start: 2023-07-05 | End: 2023-09-05 | Stop reason: CLARIF

## 2023-07-05 NOTE — PATIENT INSTRUCTIONS
Screening ct viewed-- lung nodules seen -- bilat but mainly right upper -- lung cancer risk < 0.5 % or so,    and no significant emphysema seen.    You may have some copd --  best measure by breathing test -- would do breathing test if symptoms.  Trial of trelegy once daily for copd -- continue if helps.    Smoking should be stopped.  Expect some weight gain -- you have fatty liver and prediabetes.    May consider Mourjaro or oxempic-- .discuss with Dr Mosqueda.       Trial wellbutruin may help smoke cessation -- you tried chantix and nicotene replacement in past with poor results.  Would start one daily for a wk then go to 2 daily -- stop for problems.    Yearly ct chest may be reasonable-- you are due tomorrow-- call if any concerns.....      Belching -- discussed.

## 2023-07-05 NOTE — PROGRESS NOTES
7/5/2023    Paolo Brower  New Patient Consult    Chief Complaint   Patient presents with    Shortness of Breath     Onset March after Covid     Emphysema       HPI:7/5/2023 pt works  at [employer], very active.  Had covid March with fatigue and sob-- problem persisted    Pt smokes occ now.      Grand dad had copd and may have had lung cancer....     Ct chest 9/2022 -- viewed and trivial emphysema.      Snores-- wife relates, no eds.  No snore arousal.  Occ cough,  Deep breath ppt belching-- no h/o heartburn..    No wheezes.  Breathing better once felt better after covid-- March covid and May felt better..  had stress test Monday this wk and was good.    No h/o inhaler use.    Pt has prediabetes....       PFSH:  Past Medical History:   Diagnosis Date    Diverticular stricture     Diverticulitis     Essential hypertension 05/07/2021    Helicobacter positive gastritis 4/4/2018    Known health problems: none     Other emphysema 6/8/2023         Past Surgical History:   Procedure Laterality Date    APPENDECTOMY      BLADDER REPAIR  9/18/2018    Procedure: REPAIR, BLADDER;  Surgeon: Rene Kinney MD;  Location: [facility] OR;  Service: General;;  COMPLEX     COLECTOMY N/A 9/18/2018    Procedure: COLECTOMY;  Surgeon: Rene Kinney MD;  Location: [facility] OR;  Service: General;  Laterality: N/A;    COLONOSCOPIC SURGICAL PROCEDURE  9/18/2018    Procedure: SURGICAL PROCEDURE, COLONOSCOPIC;  Surgeon: Rene Kinney MD;  Location: [facility] OR;  Service: General;;    COLONOSCOPY  03/26/2018    COLONOSCOPY N/A 9/17/2018    Procedure: COLONOSCOPY;  Surgeon: Rene Kinney MD;  Location: [facility] ENDO;  Service: General;  Laterality: N/A;  WITH POLYPECTOMY AND TATTOOING    CYSTOSCOPY W/ RETROGRADES Bilateral 10/31/2018    Procedure: CYSTOSCOPY, WITH RETROGRADE PYELOGRAM;  Surgeon: Charli Shaikh MD;  Location: [facility] OR;  Service: Urology;  Laterality: Bilateral;    CYSTOSCOPY W/ URETERAL  "STENT PLACEMENT Bilateral 2018    Procedure: CYSTOSCOPY, WITH URETERAL STENT INSERTION;  Surgeon: Charli Shaikh MD;  Location: [facility] OR;  Service: Urology;  Laterality: Bilateral;    CYSTOSCOPY W/ URETERAL STENT REMOVAL Bilateral 10/31/2018    Procedure: CYSTOSCOPY, WITH URETERAL STENT REMOVAL;  Surgeon: Charli Shaikh MD;  Location: [facility] OR;  Service: Urology;  Laterality: Bilateral;    LOW ANTERIOR RESECTION OF COLON  2018    Procedure: RESECTION, COLON, LOW ANTERIOR;  Surgeon: Rene Kinney MD;  Location: [facility] OR;  Service: General;;    TONSILLECTOMY       Social History     Tobacco Use    Smoking status: Former     Packs/day: 1.00     Years: 32.00     Pack years: 32.00     Types: Cigarettes     Quit date: 4/15/2022     Years since quittin.2    Smokeless tobacco: Current     Last attempt to quit: 4/15/2022   Substance Use Topics    Alcohol use: Yes     Comment: rarely    Drug use: No     Family History   Problem Relation Age of Onset    Diabetes Mother     Hypertension Mother     Cancer Father      Review of patient's allergies indicates:  No Known Allergies       Review of Systems:  a review of eleven systems covering constitutional, Eye, HEENT, Psych, Respiratory, Cardiac, GI, , Musculoskeletal, Endocrine, Dermatologic was negative except for pertinent findings as listed ABOVE and below:  pertinent positives as above, rest good        Exam:Comprehensive exam done. /81 (BP Location: Left arm, Patient Position: Sitting, BP Method: Large (Automatic))   Pulse 76   Ht 6' 2" (1.88 m)   Wt 121.3 kg (267 lb 6.7 oz)   SpO2 98% Comment: on room air at rest  BMI 34.33 kg/m²   Exam included Vitals as listed, and patient's appearance and affect and alertness and mood, oral exam for yeast and hygiene and pharynx lesions and Mallapatti (M) score, neck with inspection for jvd and masses and thyroid abnormalities and lymph nodes (supraclavicular and infraclavicular nodes and axillary " also examined and noted if abn), chest exam included symmetry and effort and fremitus and percussion and auscultation, cardiac exam included rhythm and gallops and murmur and rubs and jvd and edema, abdominal exam for mass and hepatosplenomegaly and tenderness and hernias and bowel sounds, Musculoskeletal exam with muscle tone and posture and mobility/gait and  strength, and skin for rashes and cyanosis and pallor and turgor, extremity for clubbing.  Findings were normal except for pertinent findings listed below:  M3, chest is symmetric, no distress, normal percussion, normal fremitus and good normal breath sounds          Radiographs (ct chest and cxr) reviewed: view by direct vision      Labs reviewed           PFT was not done       Plan:  Clinical impression is apparently straight forward and impression with management as below.    Paolo was seen today for shortness of breath and emphysema.    Diagnoses and all orders for this visit:    Nicotine dependence, cigarettes, uncomplicated  -     buPROPion (WELLBUTRIN XL) 150 MG TB24 tablet; Take 2 tablets (300 mg total) by mouth once daily.  -     fluticasone-umeclidin-vilanter (TRELEGY ELLIPTA) 200-62.5-25 mcg inhaler; Inhale 1 puff into the lungs once daily.    Other emphysema  -     Ambulatory referral/consult to Pulmonology  -     fluticasone-umeclidin-vilanter (TRELEGY ELLIPTA) 200-62.5-25 mcg inhaler; Inhale 1 puff into the lungs once daily.    Shortness of breath  -     Ambulatory referral/consult to Pulmonology  -     fluticasone-umeclidin-vilanter (TRELEGY ELLIPTA) 200-62.5-25 mcg inhaler; Inhale 1 puff into the lungs once daily.    Fatty liver    Long COVID    Lung nodules        Follow up if symptoms worsen or fail to improve.    Discussed with patient above for education the following:      Patient Instructions   Screening ct viewed-- lung nodules seen -- bilat but mainly right upper -- lung cancer risk < 0.5 % or so,    and no significant emphysema  seen.    You may have some copd --  best measure by breathing test -- would do breathing test if symptoms.  Trial of trelegy once daily for copd -- continue if helps.    Smoking should be stopped.  Expect some weight gain -- you have fatty liver and prediabetes.    May consider Mourjaro or oxempic-- .discuss with Dr Mosqueda.       Trial wellbutruin may help smoke cessation -- you tried chantix and nicotene replacement in past with poor results.  Would start one daily for a wk then go to 2 daily -- stop for problems.    Yearly ct chest may be reasonable-- you are due tomorrow-- call if any concerns.....      Belching -- discussed.    Frank took 49 min.

## 2023-07-06 ENCOUNTER — HOSPITAL ENCOUNTER (OUTPATIENT)
Dept: RADIOLOGY | Facility: HOSPITAL | Age: 53
Discharge: HOME | End: 2023-07-06
Attending: STUDENT IN AN ORGANIZED HEALTH CARE EDUCATION/TRAINING PROGRAM
Payer: COMMERCIAL

## 2023-07-06 DIAGNOSIS — R91.1 SOLITARY PULMONARY NODULE: ICD-10-CM

## 2023-07-06 DIAGNOSIS — R14.2 BELCHING: ICD-10-CM

## 2023-07-06 PROCEDURE — 76705 ECHO EXAM OF ABDOMEN: CPT | Mod: 26,,, | Performed by: RADIOLOGY

## 2023-07-06 PROCEDURE — 71271 CT THORAX LUNG CANCER SCR C-: CPT | Mod: 26,,, | Performed by: RADIOLOGY

## 2023-07-06 PROCEDURE — 76705 ECHO EXAM OF ABDOMEN: CPT | Mod: TC

## 2023-07-06 PROCEDURE — 71271 CT CHEST LUNG SCREENING LOW DOSE: ICD-10-PCS | Mod: 26,,, | Performed by: RADIOLOGY

## 2023-07-06 PROCEDURE — 71271 CT THORAX LUNG CANCER SCR C-: CPT | Mod: TC

## 2023-07-06 PROCEDURE — 76705 US ABDOMEN LIMITED_GALLBLADDER: ICD-10-PCS | Mod: 26,,, | Performed by: RADIOLOGY

## 2023-07-07 DIAGNOSIS — Q44.1 GALLBLADDER ANOMALY: ICD-10-CM

## 2023-07-07 DIAGNOSIS — R14.2 BELCHING: Primary | ICD-10-CM

## 2023-07-12 ENCOUNTER — PATIENT MESSAGE (OUTPATIENT)
Dept: FAMILY MEDICINE | Facility: CLINIC | Age: 53
End: 2023-07-12
Payer: COMMERCIAL

## 2023-07-13 NOTE — PROGRESS NOTES
Subjective:       Patient ID: Paolo Brower is a 52 y.o. male Body mass index is 34.22 kg/m².    Chief Complaint: belching (gerd)    This patient is new to me.  Referring Provider: Agata Youssef for GERD.             Gastroesophageal Reflux  He complains of abdominal pain (states discomfort in epigastric area, occurs daily, started 3 months ago, rates 2/10 pain, denies pain now), belching (states excessive belching started 3 months ago worse when eating any types of food or taking deep breaths) and heartburn (denies having current heartburn states used to have heartburn cannot recall last time of heartburn episode). He reports no chest pain, no dysphagia, no early satiety, no globus sensation, no hoarse voice, no nausea, no sore throat or no water brash. GERD symptoms first started about 6 years ago, was not prescribed anything for gerd in the past. states would just take TUMS. The problem has been unchanged. The symptoms are aggravated by certain foods, caffeine and smoking (1 cup coffee daily). Pertinent negatives include no anemia, fatigue, melena or weight loss. Risk factors include caffeine use and NSAIDs (ibuprofen once a month for aches and pains). He has tried a PPI and an antacid (was prescribed Protonix 40mg PO daily by PCP 3 weeks ago states takes medication in the morning with food) for the symptoms. The treatment provided mild relief. Past procedures include an abdominal ultrasound (U/S abdomen 7/6/23 per radiology: Contracted gallbladder without definite stones or wall thickening,Hepatomegaly and fatty liver infiltration, pt has appointment with general surgery for gallbladder). Past procedures do not include an EGD.   GI Problem  The primary symptoms include abdominal pain (states discomfort in epigastric area, occurs daily, started 3 months ago, rates 2/10 pain, denies pain now). Primary symptoms do not include fever, weight loss, fatigue, nausea, vomiting, diarrhea, melena, hematemesis,  jaundice, hematochezia or dysuria. Primary symptoms comment: GERD symptoms first started about 6 years ago, was not prescribed anything for gerd in the past. states would just take TUMS.   The illness does not include dysphagia or constipation. Associated symptoms comments: Has 1-2 BMs daily right stool type 3-4 on [place] scale, Hx of colovesicular fistula and diverticulitis 9/18/18 sigmoid colectomy with low rectal anastomosis and bladder repair.  Last colonoscopy 9/17/18 per Dr. Kinney IMPRESSION: Two 3 to 6 mm polyps in the rectum, Tortuous colon. Severe diverticulosis in the sigmoid colon. Per pathology report 2 hyperplastic polyps. no recommendation of next colonoscopy listed    . Significant associated medical issues include GERD, bowel resection and diverticulitis. Associated medical issues do not include alcohol abuse or PUD.     Review of Systems   Constitutional:  Negative for appetite change, fatigue, fever, unexpected weight change and weight loss.   HENT:  Negative for hoarse voice and sore throat.    Cardiovascular:  Negative for chest pain.   Gastrointestinal:  Positive for abdominal pain (states discomfort in epigastric area, occurs daily, started 3 months ago, rates 2/10 pain, denies pain now) and heartburn (denies having current heartburn states used to have heartburn cannot recall last time of heartburn episode). Negative for anal bleeding, blood in stool, constipation, diarrhea, dysphagia, hematemesis, hematochezia, jaundice, melena, nausea, rectal pain and vomiting.   Genitourinary:  Negative for dysuria.       No LMP for male patient.  Past Medical History:   Diagnosis Date    Colon polyp     Diverticular stricture     Diverticulitis     Essential hypertension 05/07/2021    GERD (gastroesophageal reflux disease)     Helicobacter positive gastritis 04/04/2018    Known health problems: none     Other emphysema 06/08/2023     Past Surgical History:   Procedure Laterality Date    APPENDECTOMY       BLADDER REPAIR  09/18/2018    Procedure: REPAIR, BLADDER;  Surgeon: Rene Kinney MD;  Location: [facility] OR;  Service: General;;  COMPLEX     COLECTOMY N/A 09/18/2018    Procedure: COLECTOMY;  Surgeon: Rene Kinney MD;  Location: [facility] OR;  Service: General;  Laterality: N/A;    COLONOSCOPIC SURGICAL PROCEDURE  09/18/2018    Procedure: SURGICAL PROCEDURE, COLONOSCOPIC;  Surgeon: Rene Kinney MD;  Location: [facility] OR;  Service: General;;    COLONOSCOPY  03/26/2018    COLONOSCOPY N/A 09/17/2018    Procedure: COLONOSCOPY;  Surgeon: Rene Kinney MD;  Location: [facility] ENDO;  Service: General;  Laterality: N/A;  WITH POLYPECTOMY AND TATTOOING    COLOSTOMY      9/17/18    CYSTOSCOPY W/ RETROGRADES Bilateral 10/31/2018    Procedure: CYSTOSCOPY, WITH RETROGRADE PYELOGRAM;  Surgeon: Charli Shaikh MD;  Location: [facility] OR;  Service: Urology;  Laterality: Bilateral;    CYSTOSCOPY W/ URETERAL STENT PLACEMENT Bilateral 09/12/2018    Procedure: CYSTOSCOPY, WITH URETERAL STENT INSERTION;  Surgeon: Charli Shaikh MD;  Location: [facility] OR;  Service: Urology;  Laterality: Bilateral;    CYSTOSCOPY W/ URETERAL STENT REMOVAL Bilateral 10/31/2018    Procedure: CYSTOSCOPY, WITH URETERAL STENT REMOVAL;  Surgeon: Charli Shaikh MD;  Location: [facility] OR;  Service: Urology;  Laterality: Bilateral;    LOW ANTERIOR RESECTION OF COLON  09/18/2018    Procedure: RESECTION, COLON, LOW ANTERIOR;  Surgeon: Rene Kinney MD;  Location: [facility] OR;  Service: General;;    STOMACH SURGERY      TONSILLECTOMY       Family History   Problem Relation Age of Onset    Diabetes Mother     Hypertension Mother     Cancer Father     Colon cancer Other     Liver disease Neg Hx     Rectal cancer Neg Hx     Liver cancer Neg Hx     Colon polyps Neg Hx     Crohn's disease Neg Hx     Esophageal cancer Neg Hx      Social History     Tobacco Use    Smoking status: Every Day     Packs/day: 1.00     Years: 32.00     Pack years:  32.00     Types: Cigarettes     Last attempt to quit: 4/15/2022     Years since quittin.2    Smokeless tobacco: Current     Last attempt to quit: 4/15/2022    Tobacco comments:     1 pack every 2-3 days    Substance Use Topics    Alcohol use: Yes     Comment: rarely    Drug use: No     Wt Readings from Last 10 Encounters:   23 120.9 kg (266 lb 8.6 oz)   23 121.3 kg (267 lb 6.7 oz)   23 124.3 kg (274 lb)   23 124.4 kg (274 lb 4 oz)   23 125.6 kg (276 lb 14.4 oz)   23 126.7 kg (279 lb 6.4 oz)   10/19/22 123.4 kg (272 lb 1.6 oz)   22 119.3 kg (263 lb 0.1 oz)   22 118 kg (260 lb 3.2 oz)   22 118.9 kg (262 lb 1.6 oz)     Lab Results   Component Value Date    WBC 10.52 05/15/2023    HGB 15.4 05/15/2023    HCT 44.7 05/15/2023    MCV 83 05/15/2023     05/15/2023     CMP  Sodium   Date Value Ref Range Status   05/15/2023 139 136 - 145 mmol/L Final     Potassium   Date Value Ref Range Status   05/15/2023 4.4 3.5 - 5.1 mmol/L Final     Chloride   Date Value Ref Range Status   05/15/2023 109 95 - 110 mmol/L Final     CO2   Date Value Ref Range Status   05/15/2023 21 (L) 23 - 29 mmol/L Final     Glucose   Date Value Ref Range Status   05/15/2023 102 70 - 110 mg/dL Final     BUN   Date Value Ref Range Status   05/15/2023 15 6 - 20 mg/dL Final     Creatinine   Date Value Ref Range Status   05/15/2023 1.0 0.5 - 1.4 mg/dL Final     Calcium   Date Value Ref Range Status   05/15/2023 9.6 8.7 - 10.5 mg/dL Final     Total Protein   Date Value Ref Range Status   05/15/2023 6.9 6.0 - 8.4 g/dL Final     Albumin   Date Value Ref Range Status   05/15/2023 4.1 3.5 - 5.2 g/dL Final     Total Bilirubin   Date Value Ref Range Status   05/15/2023 0.5 0.1 - 1.0 mg/dL Final     Comment:     For infants and newborns, interpretation of results should be based  on gestational age, weight and in agreement with clinical  observations.    Premature Infant recommended reference ranges:  Up  to 24 hours.............<8.0 mg/dL  Up to 48 hours............<12.0 mg/dL  3-5 days..................<15.0 mg/dL  6-29 days.................<15.0 mg/dL       Alkaline Phosphatase   Date Value Ref Range Status   05/15/2023 109 55 - 135 U/L Final     AST   Date Value Ref Range Status   05/15/2023 14 10 - 40 U/L Final     ALT   Date Value Ref Range Status   05/15/2023 31 10 - 44 U/L Final     Anion Gap   Date Value Ref Range Status   05/15/2023 9 8 - 16 mmol/L Final     eGFR if    Date Value Ref Range Status   02/17/2022 >60.0 >60 mL/min/1.73 m^2 Final     eGFR if non    Date Value Ref Range Status   02/17/2022 >60.0 >60 mL/min/1.73 m^2 Final     Comment:     Calculation used to obtain the estimated glomerular filtration  rate (eGFR) is the CKD-EPI equation.          Lab Results   Component Value Date    TSH 2.948 05/15/2023       Reviewed prior medical records including office visit Dr. Kinney 4/4/2018, office visit Dr. Kinney 08/01/2018 radiology report of US abdomen 7/6/23 & endoscopy history 9/17/18 colonoscopy (see surgical history/procedures).    Objective:      Physical Exam  Vitals and nursing note reviewed.   Constitutional:       Appearance: Normal appearance. He is normal weight.   Cardiovascular:      Rate and Rhythm: Normal rate and regular rhythm.      Heart sounds: Normal heart sounds.   Pulmonary:      Breath sounds: Normal breath sounds.   Abdominal:      General: Bowel sounds are normal.      Palpations: Abdomen is soft.   Skin:     General: Skin is warm and dry.      Coloration: Skin is not jaundiced.   Neurological:      Mental Status: He is alert and oriented to person, place, and time.   Psychiatric:         Mood and Affect: Mood normal.         Behavior: Behavior normal.       Assessment:       1. Belching    2. Epigastric pain    3. Gastroesophageal reflux disease, unspecified whether esophagitis present    4. Heartburn    5. History of Helicobacter pylori  infection    6. History of diverticulitis    7. Family history of colon cancer    8. History of colectomy    9. Fatty infiltration of liver    10. History of hepatomegaly        Plan:       Belching  -     Case Request Endoscopy: EGD (ESOPHAGOGASTRODUODENOSCOPY)  - schedule EGD, discussed procedure with patient, including risks and benefits, patient verbalized understanding  -Recommended OTC Mylanta  -Awaiting EGD results  -testing for H. Pylori typically performed during EGD patient verbalized understanding and agreed with management plan      Epigastric pain  -     Case Request Endoscopy: EGD (ESOPHAGOGASTRODUODENOSCOPY)  -Continue Protonix 40mg PO daily, Take PPI 30min-1hr before eating breakfast  -If no improvement in symptoms or symptoms worsen, call/follow-up at clinic or go to ER     Gastroesophageal reflux disease, unspecified whether esophagitis present, Heartburn  -     Case Request Endoscopy: EGD (ESOPHAGOGASTRODUODENOSCOPY)   -Continue Protonix 40mg PO daily, Take PPI 30min-1hr before eating breakfast   -discussed about the different types of medications used to treat reflux and how to use them, antacids can be used PRN for breakthrough heartburn symptoms by reducing stomach acid that is already produced, H2 blockers work by limiting the amount acid production, & PPI's work to block acid production and are taken daily, patient verbalized understanding.  -Educated patient on lifestyle modifications to help control/reduce reflux/abdominal pain including: avoid large meals, avoid eating within 2-3 hours of bedtime (avoid late night eating & lying down soon after eating), elevate head of bed if nocturnal symptoms are present, smoking cessation (if current smoker), & weight loss (if overweight).   -Educated to avoid known foods which trigger reflux symptoms & to minimize/avoid high-fat foods, chocolate, caffeine, citrus, alcohol, & tomato products.  -Advised to avoid/limit use of NSAID's, since they can cause  GI upset, bleeding, and/or ulcers. If needed, take with food.     History of Helicobacter pylori infection  -     Case Request Endoscopy: EGD (ESOPHAGOGASTRODUODENOSCOPY)  testing for H. Pylori typically performed during EGD, patient verbalized understanding and agreed with management plan    History of diverticulitis  -Recommended high fiber diet, Recommended daily exercise, adequate water intake (six 8-oz glasses of water daily), and high fiber diet. OTC fiber supplements are recommended if diet does not reach daily fiber goal (25 grams daily), such as Metamucil, Citrucel, or FiberCon (take as directed, separate from other oral medications by >2 hours).    Family history of colon cancer   -Last colonoscopy 9/17/18, F/u for surveillance colonoscopy within 5 years 9/2023  - schedule Colonoscopy, discussed procedure with the patient, including risks and benefits, patient verbalized understanding    History of colectomy   - Cont. To F/u with Gen surgery surgeon    Fatty infiltration of liver  -For fatty liver recommend: low fat, low cholesterol diet, maintain good control of blood sugars and cholesterol levels, exercise, weight loss (if overweight), minimize/avoid alcohol and tylenol products, & follow-up with PCP for continued evaluation and management; if specialist is needed, recommend seeing hepatology.  -     Ambulatory referral/consult to Hepatology; Future; Expected date: 07/21/2023    History of hepatomegaly  -     HEPATITIS PANEL, ACUTE; Future; Expected date: 07/14/2023  -     Ambulatory referral/consult to Hepatology; Future; Expected date: 07/21/2023      Follow up in about 4 weeks (around 8/11/2023), or if symptoms worsen or fail to improve.      If no improvement in symptoms or symptoms worsen, call/follow-up at clinic or go to ER.    LOLY Irizarry     [facility]  [facility] - GASTROENTEROLOGY  [facility], [place]     Dictation software program was used for this note. Please  expect some simple typographical  errors in this note.    Encounter includes face to face time and non-face to face time preparing to see the patient (eg, review of tests), obtaining and/or reviewing separately obtained history, documenting clinical information in the electronic or other health record, independently interpreting results (not separately reported) and communicating results to the patient/family/caregiver, or care coordination (not separately reported).

## 2023-07-14 ENCOUNTER — PATIENT MESSAGE (OUTPATIENT)
Dept: GASTROENTEROLOGY | Facility: CLINIC | Age: 53
End: 2023-07-14

## 2023-07-14 ENCOUNTER — OFFICE VISIT (OUTPATIENT)
Dept: GASTROENTEROLOGY | Facility: CLINIC | Age: 53
End: 2023-07-14
Payer: COMMERCIAL

## 2023-07-14 ENCOUNTER — TELEPHONE (OUTPATIENT)
Dept: GASTROENTEROLOGY | Facility: CLINIC | Age: 53
End: 2023-07-14

## 2023-07-14 VITALS
BODY MASS INDEX: 34.21 KG/M2 | SYSTOLIC BLOOD PRESSURE: 119 MMHG | DIASTOLIC BLOOD PRESSURE: 66 MMHG | HEIGHT: 74 IN | WEIGHT: 266.56 LBS | HEART RATE: 68 BPM

## 2023-07-14 DIAGNOSIS — Z80.0 FAMILY HISTORY OF COLON CANCER: ICD-10-CM

## 2023-07-14 DIAGNOSIS — Z90.49 HISTORY OF COLECTOMY: ICD-10-CM

## 2023-07-14 DIAGNOSIS — K21.9 GASTROESOPHAGEAL REFLUX DISEASE, UNSPECIFIED WHETHER ESOPHAGITIS PRESENT: ICD-10-CM

## 2023-07-14 DIAGNOSIS — R12 HEARTBURN: ICD-10-CM

## 2023-07-14 DIAGNOSIS — K76.0 FATTY INFILTRATION OF LIVER: ICD-10-CM

## 2023-07-14 DIAGNOSIS — Z87.19 HISTORY OF DIVERTICULITIS: ICD-10-CM

## 2023-07-14 DIAGNOSIS — Z87.898 HISTORY OF HEPATOMEGALY: ICD-10-CM

## 2023-07-14 DIAGNOSIS — R10.13 EPIGASTRIC PAIN: ICD-10-CM

## 2023-07-14 DIAGNOSIS — Z86.19 HISTORY OF HELICOBACTER PYLORI INFECTION: ICD-10-CM

## 2023-07-14 DIAGNOSIS — R14.2 BELCHING: Primary | ICD-10-CM

## 2023-07-14 PROCEDURE — 1159F MED LIST DOCD IN RCRD: CPT | Mod: CPTII,S$GLB,,

## 2023-07-14 PROCEDURE — 1159F PR MEDICATION LIST DOCUMENTED IN MEDICAL RECORD: ICD-10-PCS | Mod: CPTII,S$GLB,,

## 2023-07-14 PROCEDURE — 3061F NEG MICROALBUMINURIA REV: CPT | Mod: CPTII,S$GLB,,

## 2023-07-14 PROCEDURE — 99204 PR OFFICE/OUTPT VISIT, NEW, LEVL IV, 45-59 MIN: ICD-10-PCS | Mod: S$GLB,,,

## 2023-07-14 PROCEDURE — 3078F DIAST BP <80 MM HG: CPT | Mod: CPTII,S$GLB,,

## 2023-07-14 PROCEDURE — 1160F PR REVIEW ALL MEDS BY PRESCRIBER/CLIN PHARMACIST DOCUMENTED: ICD-10-PCS | Mod: CPTII,S$GLB,,

## 2023-07-14 PROCEDURE — 3061F PR NEG MICROALBUMINURIA RESULT DOCUMENTED/REVIEW: ICD-10-PCS | Mod: CPTII,S$GLB,,

## 2023-07-14 PROCEDURE — 4010F PR ACE/ARB THEARPY RXD/TAKEN: ICD-10-PCS | Mod: CPTII,S$GLB,,

## 2023-07-14 PROCEDURE — 3044F HG A1C LEVEL LT 7.0%: CPT | Mod: CPTII,S$GLB,,

## 2023-07-14 PROCEDURE — 1160F RVW MEDS BY RX/DR IN RCRD: CPT | Mod: CPTII,S$GLB,,

## 2023-07-14 PROCEDURE — 3044F PR MOST RECENT HEMOGLOBIN A1C LEVEL <7.0%: ICD-10-PCS | Mod: CPTII,S$GLB,,

## 2023-07-14 PROCEDURE — 99999 PR PBB SHADOW E&M-EST. PATIENT-LVL IV: CPT | Mod: PBBFAC,,,

## 2023-07-14 PROCEDURE — 99999 PR PBB SHADOW E&M-EST. PATIENT-LVL IV: ICD-10-PCS | Mod: PBBFAC,,,

## 2023-07-14 PROCEDURE — 3008F BODY MASS INDEX DOCD: CPT | Mod: CPTII,S$GLB,,

## 2023-07-14 PROCEDURE — 3078F PR MOST RECENT DIASTOLIC BLOOD PRESSURE < 80 MM HG: ICD-10-PCS | Mod: CPTII,S$GLB,,

## 2023-07-14 PROCEDURE — 3008F PR BODY MASS INDEX (BMI) DOCUMENTED: ICD-10-PCS | Mod: CPTII,S$GLB,,

## 2023-07-14 PROCEDURE — 3074F SYST BP LT 130 MM HG: CPT | Mod: CPTII,S$GLB,,

## 2023-07-14 PROCEDURE — 99204 OFFICE O/P NEW MOD 45 MIN: CPT | Mod: S$GLB,,,

## 2023-07-14 PROCEDURE — 3066F NEPHROPATHY DOC TX: CPT | Mod: CPTII,S$GLB,,

## 2023-07-14 PROCEDURE — 4010F ACE/ARB THERAPY RXD/TAKEN: CPT | Mod: CPTII,S$GLB,,

## 2023-07-14 PROCEDURE — 3074F PR MOST RECENT SYSTOLIC BLOOD PRESSURE < 130 MM HG: ICD-10-PCS | Mod: CPTII,S$GLB,,

## 2023-07-14 PROCEDURE — 3066F PR DOCUMENTATION OF TREATMENT FOR NEPHROPATHY: ICD-10-PCS | Mod: CPTII,S$GLB,,

## 2023-07-14 NOTE — TELEPHONE ENCOUNTER
----- Message from Rhonda Morse sent at 7/14/2023 12:37 PM CDT -----  Contact: self  Type: Patient Returning Call        Who Called: Patient   Who Left Message for Patient: VIVIENNE Benito   Does the patient know what this is regarding?: n/a  Best Call Back Number: [phone]  Additional Information: Pt states he need a another call back. Thanks

## 2023-07-17 RX ORDER — MELOXICAM 15 MG/1
TABLET ORAL
Qty: 30 TABLET | Refills: 0 | Status: SHIPPED | OUTPATIENT
Start: 2023-07-17 | End: 2023-08-10

## 2023-07-25 ENCOUNTER — TELEPHONE (OUTPATIENT)
Dept: SMOKING CESSATION | Facility: CLINIC | Age: 53
End: 2023-07-25
Payer: COMMERCIAL

## 2023-07-25 NOTE — TELEPHONE ENCOUNTER
I sent patient a detailed text message as he works shift work and I did not want to wake him if sleeping. I offered to schedule an intake appointment with smoking cessation. He has my contact information. I requested a return call.

## 2023-08-07 ENCOUNTER — OFFICE VISIT (OUTPATIENT)
Dept: SURGERY | Facility: CLINIC | Age: 53
End: 2023-08-07
Payer: COMMERCIAL

## 2023-08-07 VITALS
OXYGEN SATURATION: 98 % | SYSTOLIC BLOOD PRESSURE: 136 MMHG | BODY MASS INDEX: 33.5 KG/M2 | HEIGHT: 74 IN | WEIGHT: 261 LBS | HEART RATE: 90 BPM | DIASTOLIC BLOOD PRESSURE: 84 MMHG

## 2023-08-07 DIAGNOSIS — R14.2 BELCHING: ICD-10-CM

## 2023-08-07 DIAGNOSIS — K82.0 GALLBLADDER CONTRACTION: ICD-10-CM

## 2023-08-07 PROCEDURE — 3066F NEPHROPATHY DOC TX: CPT | Mod: S$GLB,,, | Performed by: STUDENT IN AN ORGANIZED HEALTH CARE EDUCATION/TRAINING PROGRAM

## 2023-08-07 PROCEDURE — 3044F HG A1C LEVEL LT 7.0%: CPT | Mod: S$GLB,,, | Performed by: STUDENT IN AN ORGANIZED HEALTH CARE EDUCATION/TRAINING PROGRAM

## 2023-08-07 PROCEDURE — 1159F PR MEDICATION LIST DOCUMENTED IN MEDICAL RECORD: ICD-10-PCS | Mod: S$GLB,,, | Performed by: STUDENT IN AN ORGANIZED HEALTH CARE EDUCATION/TRAINING PROGRAM

## 2023-08-07 PROCEDURE — 99203 PR OFFICE/OUTPT VISIT, NEW, LEVL III, 30-44 MIN: ICD-10-PCS | Mod: S$GLB,,, | Performed by: STUDENT IN AN ORGANIZED HEALTH CARE EDUCATION/TRAINING PROGRAM

## 2023-08-07 PROCEDURE — 99999 PR PBB SHADOW E&M-EST. PATIENT-LVL III: ICD-10-PCS | Mod: PBBFAC,,, | Performed by: STUDENT IN AN ORGANIZED HEALTH CARE EDUCATION/TRAINING PROGRAM

## 2023-08-07 PROCEDURE — 3008F BODY MASS INDEX DOCD: CPT | Mod: S$GLB,,, | Performed by: STUDENT IN AN ORGANIZED HEALTH CARE EDUCATION/TRAINING PROGRAM

## 2023-08-07 PROCEDURE — 3061F PR NEG MICROALBUMINURIA RESULT DOCUMENTED/REVIEW: ICD-10-PCS | Mod: S$GLB,,, | Performed by: STUDENT IN AN ORGANIZED HEALTH CARE EDUCATION/TRAINING PROGRAM

## 2023-08-07 PROCEDURE — 4010F PR ACE/ARB THEARPY RXD/TAKEN: ICD-10-PCS | Mod: S$GLB,,, | Performed by: STUDENT IN AN ORGANIZED HEALTH CARE EDUCATION/TRAINING PROGRAM

## 2023-08-07 PROCEDURE — 4010F ACE/ARB THERAPY RXD/TAKEN: CPT | Mod: S$GLB,,, | Performed by: STUDENT IN AN ORGANIZED HEALTH CARE EDUCATION/TRAINING PROGRAM

## 2023-08-07 PROCEDURE — 3079F DIAST BP 80-89 MM HG: CPT | Mod: S$GLB,,, | Performed by: STUDENT IN AN ORGANIZED HEALTH CARE EDUCATION/TRAINING PROGRAM

## 2023-08-07 PROCEDURE — 3066F PR DOCUMENTATION OF TREATMENT FOR NEPHROPATHY: ICD-10-PCS | Mod: S$GLB,,, | Performed by: STUDENT IN AN ORGANIZED HEALTH CARE EDUCATION/TRAINING PROGRAM

## 2023-08-07 PROCEDURE — 3079F PR MOST RECENT DIASTOLIC BLOOD PRESSURE 80-89 MM HG: ICD-10-PCS | Mod: S$GLB,,, | Performed by: STUDENT IN AN ORGANIZED HEALTH CARE EDUCATION/TRAINING PROGRAM

## 2023-08-07 PROCEDURE — 3044F PR MOST RECENT HEMOGLOBIN A1C LEVEL <7.0%: ICD-10-PCS | Mod: S$GLB,,, | Performed by: STUDENT IN AN ORGANIZED HEALTH CARE EDUCATION/TRAINING PROGRAM

## 2023-08-07 PROCEDURE — 99999 PR PBB SHADOW E&M-EST. PATIENT-LVL III: CPT | Mod: PBBFAC,,, | Performed by: STUDENT IN AN ORGANIZED HEALTH CARE EDUCATION/TRAINING PROGRAM

## 2023-08-07 PROCEDURE — 1159F MED LIST DOCD IN RCRD: CPT | Mod: S$GLB,,, | Performed by: STUDENT IN AN ORGANIZED HEALTH CARE EDUCATION/TRAINING PROGRAM

## 2023-08-07 PROCEDURE — 3075F SYST BP GE 130 - 139MM HG: CPT | Mod: S$GLB,,, | Performed by: STUDENT IN AN ORGANIZED HEALTH CARE EDUCATION/TRAINING PROGRAM

## 2023-08-07 PROCEDURE — 3075F PR MOST RECENT SYSTOLIC BLOOD PRESS GE 130-139MM HG: ICD-10-PCS | Mod: S$GLB,,, | Performed by: STUDENT IN AN ORGANIZED HEALTH CARE EDUCATION/TRAINING PROGRAM

## 2023-08-07 PROCEDURE — 3008F PR BODY MASS INDEX (BMI) DOCUMENTED: ICD-10-PCS | Mod: S$GLB,,, | Performed by: STUDENT IN AN ORGANIZED HEALTH CARE EDUCATION/TRAINING PROGRAM

## 2023-08-07 PROCEDURE — 3061F NEG MICROALBUMINURIA REV: CPT | Mod: S$GLB,,, | Performed by: STUDENT IN AN ORGANIZED HEALTH CARE EDUCATION/TRAINING PROGRAM

## 2023-08-07 PROCEDURE — 99203 OFFICE O/P NEW LOW 30 MIN: CPT | Mod: S$GLB,,, | Performed by: STUDENT IN AN ORGANIZED HEALTH CARE EDUCATION/TRAINING PROGRAM

## 2023-08-07 NOTE — PROGRESS NOTES
[facility] Surgery H&P Note    Subjective:       Patient ID: Paolo Brower is a 53 y.o. male.    Chief Complaint: belching  HPI:  Paolo Brower is a 53 y.o. male with history of diverticulitis resulting in diverticular stricture now status post sigmoid colectomy, hypertension, GERD, H pylori gastritis, emphysema presents today for evaluation for belching.  Symptoms have been going on for several months now.  He has tried Maalox with no relief.  He has been evaluated by GI with plans to perform an EGD in the next few weeks.  An abdominal ultrasound was performed which did not reveal any gallstones or gallbladder wall thickening.  The gallbladder was contracted.  Patient denies any abdominal pain, nausea, vomiting, acid reflux, or diarrhea.  He has not tried simethicone. He denies carbonated beverage intake or alcohol use. He is a smoker.    Past Medical History:   Diagnosis Date    Colon polyp     Diverticular stricture     Diverticulitis     Essential hypertension 05/07/2021    GERD (gastroesophageal reflux disease)     Helicobacter positive gastritis 04/04/2018    Known health problems: none     Other emphysema 06/08/2023     Past Surgical History:   Procedure Laterality Date    APPENDECTOMY      BLADDER REPAIR  09/18/2018    Procedure: REPAIR, BLADDER;  Surgeon: Rene Kinney MD;  Location: [facility] OR;  Service: General;;  COMPLEX     COLECTOMY N/A 09/18/2018    Procedure: COLECTOMY;  Surgeon: Rene Kinney MD;  Location: [facility] OR;  Service: General;  Laterality: N/A;    COLONOSCOPIC SURGICAL PROCEDURE  09/18/2018    Procedure: SURGICAL PROCEDURE, COLONOSCOPIC;  Surgeon: Rene Kinney MD;  Location: [facility] OR;  Service: General;;    COLONOSCOPY  03/26/2018    COLONOSCOPY N/A 09/17/2018    Procedure: COLONOSCOPY;  Surgeon: Rene Kinney MD;  Location: [facility] ENDO;  Service: General;  Laterality: N/A;  WITH POLYPECTOMY AND TATTOOING    COLOSTOMY      9/17/18    CYSTOSCOPY W/  RETROGRADES Bilateral 10/31/2018    Procedure: CYSTOSCOPY, WITH RETROGRADE PYELOGRAM;  Surgeon: Charli Shaikh MD;  Location: [facility] OR;  Service: Urology;  Laterality: Bilateral;    CYSTOSCOPY W/ URETERAL STENT PLACEMENT Bilateral 2018    Procedure: CYSTOSCOPY, WITH URETERAL STENT INSERTION;  Surgeon: Charli Shaikh MD;  Location: [facility] OR;  Service: Urology;  Laterality: Bilateral;    CYSTOSCOPY W/ URETERAL STENT REMOVAL Bilateral 10/31/2018    Procedure: CYSTOSCOPY, WITH URETERAL STENT REMOVAL;  Surgeon: Charli Shiakh MD;  Location: [facility] OR;  Service: Urology;  Laterality: Bilateral;    LOW ANTERIOR RESECTION OF COLON  2018    Procedure: RESECTION, COLON, LOW ANTERIOR;  Surgeon: Rene Kinney MD;  Location: [facility];  Service: General;;    STOMACH SURGERY      TONSILLECTOMY       Family History   Problem Relation Age of Onset    Diabetes Mother     Hypertension Mother     Cancer Father     Colon cancer Other     Liver disease Neg Hx     Rectal cancer Neg Hx     Liver cancer Neg Hx     Colon polyps Neg Hx     Crohn's disease Neg Hx     Esophageal cancer Neg Hx      Social History     Socioeconomic History    Marital status:    Tobacco Use    Smoking status: Every Day     Current packs/day: 0.00     Average packs/day: 1 pack/day for 32.0 years (32.0 ttl pk-yrs)     Types: Cigarettes     Start date: 4/15/1990     Last attempt to quit: 4/15/2022     Years since quittin.3    Smokeless tobacco: Current     Last attempt to quit: 4/15/2022    Tobacco comments:     1 pack every 2-3 days    Substance and Sexual Activity    Alcohol use: Yes     Comment: rarely    Drug use: No    Sexual activity: Yes     Partners: Female     Social Determinants of Health     Financial Resource Strain: Low Risk  (8/3/2021)    Overall Financial Resource Strain (CARDIA)     Difficulty of Paying Living Expenses: Not hard at all   Food Insecurity: No Food Insecurity (8/3/2021)    Hunger Vital Sign      Worried About Running Out of Food in the Last Year: Never true     Ran Out of Food in the Last Year: Never true   Transportation Needs: Unmet Transportation Needs (8/3/2021)    PRAPARE - Transportation     Lack of Transportation (Non-Medical): Yes   Social Connections: Unknown (8/3/2021)    Social Connection and Isolation Panel [NHANES]     Frequency of Communication with Friends and Family: More than three times a week       Current Outpatient Medications   Medication Sig Dispense Refill    losartan (COZAAR) 50 MG tablet Take 1 tablet (50 mg total) by mouth once daily. 90 tablet 3    meloxicam (MOBIC) 15 MG tablet Take 1 tablet by mouth once daily 30 tablet 0    pantoprazole (PROTONIX) 40 MG tablet Take 1 tablet (40 mg total) by mouth once daily. 30 tablet 11    buPROPion (WELLBUTRIN XL) 150 MG TB24 tablet Take 2 tablets (300 mg total) by mouth once daily. (Patient not taking: Reported on 8/7/2023) 60 tablet 2    fluticasone-umeclidin-vilanter (TRELEGY ELLIPTA) 200-62.5-25 mcg inhaler Inhale 1 puff into the lungs once daily. (Patient not taking: Reported on 8/7/2023) 60 each 11     No current facility-administered medications for this visit.     Review of patient's allergies indicates:  No Known Allergies    Review of Systems   Constitutional:  Negative for appetite change, chills and fever.   HENT:  Negative for congestion, dental problem and drooling.    Eyes:  Negative for photophobia, discharge and itching.   Respiratory:  Negative for apnea and chest tightness.    Cardiovascular:  Negative for chest pain, palpitations and leg swelling.   Gastrointestinal:  Negative for abdominal distention and abdominal pain.   Endocrine: Negative for cold intolerance and heat intolerance.   Genitourinary:  Negative for difficulty urinating and dysuria.   Musculoskeletal:  Negative for arthralgias and back pain.   Skin:  Negative for color change and pallor.   Neurological:  Negative for dizziness, facial asymmetry and  "headaches.   Hematological:  Negative for adenopathy. Does not bruise/bleed easily.   Psychiatric/Behavioral:  Negative for agitation, behavioral problems and confusion.        Objective:      Vitals:    08/07/23 1410   BP: 136/84   Pulse: 90   SpO2: 98%   Weight: 118.4 kg (261 lb)   Height: 6' 2" (1.88 m)     Physical Exam  Constitutional:       Appearance: He is well-developed.   HENT:      Head: Normocephalic and atraumatic.   Eyes:      Pupils: Pupils are equal, round, and reactive to light.   Cardiovascular:      Rate and Rhythm: Normal rate and regular rhythm.   Pulmonary:      Effort: Pulmonary effort is normal.      Breath sounds: Normal breath sounds.   Abdominal:      General: Bowel sounds are normal. There is no distension.      Palpations: Abdomen is soft.      Tenderness: There is no abdominal tenderness.   Musculoskeletal:         General: Normal range of motion.      Cervical back: Normal range of motion and neck supple.   Skin:     General: Skin is warm.      Findings: No erythema.   Neurological:      Mental Status: He is alert and oriented to person, place, and time.   Psychiatric:         Behavior: Behavior normal.         Lab Review: CBC:   Lab Results   Component Value Date    WBC 10.52 05/15/2023    RBC 5.36 05/15/2023    HGB 15.4 05/15/2023    HCT 44.7 05/15/2023     05/15/2023     BMP:   Lab Results   Component Value Date     05/15/2023     05/15/2023    K 4.4 05/15/2023     05/15/2023    CO2 21 (L) 05/15/2023    BUN 15 05/15/2023    CREATININE 1.0 05/15/2023    CALCIUM 9.6 05/15/2023     Lab Results   Component Value Date    ALT 31 05/15/2023    AST 14 05/15/2023    ALKPHOS 109 05/15/2023    BILITOT 0.5 05/15/2023       Diagnostics Review: US: Reviewed     Assessment:       1. Belching    2. Gallbladder contraction        Plan:   Belching  -     Ambulatory referral/consult to General Surgery    Gallbladder contraction  -     Ambulatory referral/consult to General " Surgery        Medical Decision Making/Counseling:  No typical gallbladder symptoms. Belching only sound more like GERD possibly with hiatal hernia. Agree with EGD. Consider swallow study but will defer to GI. Recommend trial of simethicone. Recommend stop smoking. Consider HIDA scan for gallbladder function if GI workup unrevealing.    Patient instructed that best way to communicate with my office staff is for patient to get on the [facility] epic patient portal to expedite communication and communication issues that may occur.  Patient was given instructions on how to get on the portal.  I encouraged patient to obtain portal access as well.  Ultimately it is up to the patient to obtain access.  Patient voiced understanding.

## 2023-08-10 RX ORDER — MELOXICAM 15 MG/1
TABLET ORAL
Qty: 30 TABLET | Refills: 0 | Status: SHIPPED | OUTPATIENT
Start: 2023-08-10 | End: 2023-10-06

## 2023-09-05 ENCOUNTER — ANESTHESIA (OUTPATIENT)
Dept: ENDOSCOPY | Facility: HOSPITAL | Age: 53
End: 2023-09-05
Payer: COMMERCIAL

## 2023-09-05 ENCOUNTER — HOSPITAL ENCOUNTER (OUTPATIENT)
Facility: HOSPITAL | Age: 53
Discharge: HOME | End: 2023-09-05
Attending: INTERNAL MEDICINE | Admitting: INTERNAL MEDICINE
Payer: COMMERCIAL

## 2023-09-05 ENCOUNTER — ANESTHESIA EVENT (OUTPATIENT)
Dept: ENDOSCOPY | Facility: HOSPITAL | Age: 53
End: 2023-09-05
Payer: COMMERCIAL

## 2023-09-05 DIAGNOSIS — R14.2 BELCHING: ICD-10-CM

## 2023-09-05 PROCEDURE — D9220A PRA ANESTHESIA: ICD-10-PCS | Mod: ANES,,, | Performed by: ANESTHESIOLOGY

## 2023-09-05 PROCEDURE — D9220A PRA ANESTHESIA: ICD-10-PCS | Mod: CRNA,,, | Performed by: NURSE ANESTHETIST, CERTIFIED REGISTERED

## 2023-09-05 PROCEDURE — D9220A PRA ANESTHESIA: Mod: ANES,,, | Performed by: ANESTHESIOLOGY

## 2023-09-05 PROCEDURE — 63600175 PHARM REV CODE 636 W HCPCS: Performed by: NURSE ANESTHETIST, CERTIFIED REGISTERED

## 2023-09-05 PROCEDURE — 25000003 PHARM REV CODE 250: Performed by: INTERNAL MEDICINE

## 2023-09-05 PROCEDURE — 43239 PR EGD, FLEX, W/BIOPSY, SGL/MULTI: ICD-10-PCS | Mod: ,,, | Performed by: INTERNAL MEDICINE

## 2023-09-05 PROCEDURE — 27201012 HC FORCEPS, HOT/COLD, DISP: Performed by: INTERNAL MEDICINE

## 2023-09-05 PROCEDURE — D9220A PRA ANESTHESIA: Mod: CRNA,,, | Performed by: NURSE ANESTHETIST, CERTIFIED REGISTERED

## 2023-09-05 PROCEDURE — 25000003 PHARM REV CODE 250: Performed by: NURSE ANESTHETIST, CERTIFIED REGISTERED

## 2023-09-05 PROCEDURE — 43239 EGD BIOPSY SINGLE/MULTIPLE: CPT | Performed by: INTERNAL MEDICINE

## 2023-09-05 PROCEDURE — 37000008 HC ANESTHESIA 1ST 15 MINUTES: Performed by: INTERNAL MEDICINE

## 2023-09-05 PROCEDURE — 43239 EGD BIOPSY SINGLE/MULTIPLE: CPT | Mod: ,,, | Performed by: INTERNAL MEDICINE

## 2023-09-05 RX ORDER — LIDOCAINE HYDROCHLORIDE 20 MG/ML
INJECTION INTRAVENOUS
Status: DISCONTINUED | OUTPATIENT
Start: 2023-09-05 | End: 2023-09-05

## 2023-09-05 RX ORDER — SODIUM CHLORIDE 9 MG/ML
INJECTION, SOLUTION INTRAVENOUS CONTINUOUS
Status: DISCONTINUED | OUTPATIENT
Start: 2023-09-05 | End: 2023-09-05 | Stop reason: HOSPADM

## 2023-09-05 RX ORDER — PROPOFOL 10 MG/ML
VIAL (ML) INTRAVENOUS
Status: DISCONTINUED | OUTPATIENT
Start: 2023-09-05 | End: 2023-09-05

## 2023-09-05 RX ORDER — SUCRALFATE 1 G/1
1 TABLET ORAL
Qty: 56 TABLET | Refills: 0 | Status: SHIPPED | OUTPATIENT
Start: 2023-09-05 | End: 2023-09-19

## 2023-09-05 RX ORDER — ONDANSETRON 2 MG/ML
INJECTION INTRAMUSCULAR; INTRAVENOUS
Status: DISCONTINUED | OUTPATIENT
Start: 2023-09-05 | End: 2023-09-05

## 2023-09-05 RX ADMIN — PROPOFOL 100 MG: 10 INJECTION, EMULSION INTRAVENOUS at 02:09

## 2023-09-05 RX ADMIN — PROPOFOL 50 MG: 10 INJECTION, EMULSION INTRAVENOUS at 02:09

## 2023-09-05 RX ADMIN — LIDOCAINE HYDROCHLORIDE 100 MG: 20 INJECTION, SOLUTION INTRAVENOUS at 02:09

## 2023-09-05 RX ADMIN — ONDANSETRON 4 MG: 2 INJECTION INTRAMUSCULAR; INTRAVENOUS at 02:09

## 2023-09-05 RX ADMIN — GLYCOPYRROLATE 0.2 MG: 0.2 INJECTION, SOLUTION INTRAMUSCULAR; INTRAVITREAL at 02:09

## 2023-09-05 RX ADMIN — SODIUM CHLORIDE: 9 INJECTION, SOLUTION INTRAVENOUS at 12:09

## 2023-09-05 NOTE — PROVATION PATIENT INSTRUCTIONS
Discharge Summary/Instructions after an Endoscopic Procedure  Patient Name: Paolo Brower  Patient MRN: [id number]  Patient YOB: 1970 Tuesday, September 5, 2023  Genesis Owen MD  Dear patient,  As a result of recent federal legislation (The Federal Cures Act), you may   receive lab or pathology results from your procedure in your [facility]   account before your physician is able to contact you. Your physician or   their representative will relay the results to you with their   recommendations at their soonest availability.  Thank you,  RESTRICTIONS:  During your procedure today, you received medications for sedation.  These   medications may affect your judgment, balance and coordination.  Therefore,   for 24 hours, you have the following restrictions:   - DO NOT drive a car, operate machinery, make legal/financial decisions,   sign important papers or drink alcohol.    ACTIVITY:  Today: no heavy lifting, straining or running due to procedural   sedation/anesthesia.  The following day: return to full activity including work.  DIET:  Eat and drink normally unless instructed otherwise.     TREATMENT FOR COMMON SIDE EFFECTS:  - Mild abdominal pain, nausea, belching, bloating or excessive gas:  rest,   eat lightly and use a heating pad.  - Sore Throat: treat with throat lozenges and/or gargle with warm salt   water.  - Because air was used during the procedure, expelling large amounts of air   from your rectum or belching is normal.  - If a bowel prep was taken, you may not have a bowel movement for 1-3 days.    This is normal.  SYMPTOMS TO WATCH FOR AND REPORT TO YOUR PHYSICIAN:  1. Abdominal pain or bloating, other than gas cramps.  2. Chest pain.  3. Back pain.  4. Signs of infection such as: chills or fever occurring within 24 hours   after the procedure.  5. Rectal bleeding, which would show as bright red, maroon, or black stools.   (A tablespoon of blood from the rectum is not serious,  especially if   hemorrhoids are present.)  6. Vomiting.  7. Weakness or dizziness.  GO DIRECTLY TO THE NEAREST EMERGENCY ROOM IF YOU HAVE ANY OF THE FOLLOWING:      Difficulty breathing              Chills and/or fever over 101 F   Persistent vomiting and/or vomiting blood   Severe abdominal pain   Severe chest pain   Black, tarry stools   Bleeding- more than one tablespoon   Any other symptom or condition that you feel may need urgent attention  Your doctor recommends these additional instructions:  If any biopsies were taken, your doctors clinic will contact you in 1 to 2   weeks with any results.  - Await pathology results.   - Discharge patient to home (with escort).   - Patient has a contact number available for emergencies.  The signs and   symptoms of potential delayed complications were discussed with the   patient.  Return to normal activities tomorrow.  Written discharge   instructions were provided to the patient.   - Resume previous diet.   - Continue present medications.   -Trial of Carafate x 14 days  For questions, problems or results please call your physician - Genesis Owen MD at Work:  [phone].  [facility], EMERGENCY ROOM PHONE NUMBER: [phone]  IF A COMPLICATION OR EMERGENCY SITUATION ARISES AND YOU ARE UNABLE TO REACH   YOUR PHYSICIAN - GO DIRECTLY TO THE EMERGENCY ROOM.  Genesis Owen MD  9/5/2023 2:17:09 PM  This report has been verified and signed electronically.  Dear patient,  As a result of recent federal legislation (The Federal Cures Act), you may   receive lab or pathology results from your procedure in your [facility]   account before your physician is able to contact you. Your physician or   their representative will relay the results to you with their   recommendations at their soonest availability.  Thank you,  PROVATION

## 2023-09-05 NOTE — H&P
[facility]    CC: Belching     HPI 53 y.o. male presents for evaluation of belching    Past Medical History:   Diagnosis Date    Colon polyp     Diverticular stricture     Diverticulitis     Essential hypertension 05/07/2021    GERD (gastroesophageal reflux disease)     Helicobacter positive gastritis 04/04/2018    Known health problems: none     Other emphysema 06/08/2023       Allergies and Medications reviewed     Review of Systems  General ROS: negative for - chills, fever or weight loss  Cardiovascular ROS: no chest pain or dyspnea on exertion  Gastrointestinal ROS: + belching    Physical Examination  /82   Pulse 78   Temp 98.4 °F (36.9 °C) (Skin)   Resp 18   Wt 109.8 kg (242 lb)   SpO2 (!) 94%   BMI 31.07 kg/m²   General appearance: alert, cooperative, no distress  HENT: Normocephalic, atraumatic, neck symmetrical, no nasal discharge, sclera anicteric   Lungs: clear to auscultation bilaterally, symmetric chest wall expansion bilaterally  Heart: regular rate and rhythm without rub; no displacement of the PMI   Abdomen: soft  Extremities: extremities symmetric; no clubbing, cyanosis, or edema        Labs:  Lab Results   Component Value Date    WBC 10.52 05/15/2023    HGB 15.4 05/15/2023    HCT 44.7 05/15/2023    MCV 83 05/15/2023     05/15/2023           Assessment:   53 y.o. male presents for EGD    Plan:  -Proceed to EGD    Genesis Owen MD  [facility]  [address], [address]  [place] [address]  Office: [phone]  Fax: [phone]

## 2023-09-05 NOTE — ANESTHESIA PREPROCEDURE EVALUATION
09/05/2023  Paolo Brower is a 53 y.o., male.      Pre-op Assessment    I have reviewed the Patient Summary Reports.     I have reviewed the Nursing Notes. I have reviewed the NPO Status.   I have reviewed the Medications.     Review of Systems  Anesthesia Hx:  No problems with previous Anesthesia    Social:  Smoker    Cardiovascular:   Hypertension, well controlled    Pulmonary:   COPD (emphysema) Shortness of breath    Renal/:  Renal/ Normal     Hepatic/GI:   GERD Liver Disease,    Neurological:  Neurology Normal    Endocrine:  Endocrine Normal        Physical Exam  General: Well nourished, Cooperative, Alert and Oriented    Airway:  Mallampati: II   Mouth Opening: Normal  TM Distance: Normal  Neck ROM: Normal ROM        Anesthesia Plan  Type of Anesthesia, risks & benefits discussed:    Anesthesia Type: Gen ETT, Gen Supraglottic Airway, Gen Natural Airway, MAC  Intra-op Monitoring Plan: Standard ASA Monitors  Post Op Pain Control Plan: multimodal analgesia  Induction:  IV  Airway Plan: Direct, Video and Fiberoptic, Post-Induction  Informed Consent: Informed consent signed with the Patient and all parties understand the risks and agree with anesthesia plan.  All questions answered.   ASA Score: 3    Ready For Surgery From Anesthesia Perspective.     .

## 2023-09-05 NOTE — PLAN OF CARE
Patient is AAO, Vss, julien po fluids, denies pain, ambulates easily. IV removed, catheter intact. Dr. Owen discussed results with patient. Discharge instructions provided and states understanding. States ready to go home.  Discharged from facility with family per wheelchair.

## 2023-09-05 NOTE — TRANSFER OF CARE
Anesthesia Transfer of Care Note    Patient: Paolo Brower    Procedure(s) Performed: Procedure(s) (LRB):  EGD (ESOPHAGOGASTRODUODENOSCOPY) (N/A)    Patient location: GI    Anesthesia Type: general    Transport from OR: Transported from OR on room air with adequate spontaneous ventilation    Post pain: adequate analgesia    Post assessment: no apparent anesthetic complications    Post vital signs: stable    Level of consciousness: awake    Nausea/Vomiting: no nausea/vomiting    Complications: none    Transfer of care protocol was followed      Last vitals:   Visit Vitals  /82   Pulse 78   Temp 36.9 °C (98.4 °F) (Skin)   Resp 18   Wt 109.8 kg (242 lb)   SpO2 (!) 94%   BMI 31.07 kg/m²

## 2023-09-06 VITALS
WEIGHT: 242 LBS | TEMPERATURE: 98 F | DIASTOLIC BLOOD PRESSURE: 94 MMHG | RESPIRATION RATE: 16 BRPM | OXYGEN SATURATION: 92 % | HEART RATE: 65 BPM | SYSTOLIC BLOOD PRESSURE: 130 MMHG | BODY MASS INDEX: 31.07 KG/M2

## 2023-09-06 NOTE — ANESTHESIA POSTPROCEDURE EVALUATION
Anesthesia Post Evaluation    Patient: Paolo Brower    Procedure(s) Performed: Procedure(s) (LRB):  EGD (ESOPHAGOGASTRODUODENOSCOPY) (N/A)    Final Anesthesia Type: general      Patient location during evaluation: PACU  Patient participation: Yes- Able to Participate  Level of consciousness: awake and alert and oriented  Post-procedure vital signs: reviewed and stable  Pain management: adequate  Airway patency: patent    PONV status at discharge: No PONV  Anesthetic complications: no      Cardiovascular status: blood pressure returned to baseline and stable  Respiratory status: unassisted and spontaneous ventilation  Hydration status: euvolemic  Follow-up not needed.          Vitals Value Taken Time   /94 09/05/23 1435   Temp 36.8 °C (98.2 °F) 09/05/23 1435   Pulse 65 09/05/23 1435   Resp 16 09/05/23 1435   SpO2 92 % 09/05/23 1435         Event Time   Out of Recovery 14:51:22         Pain/Ortiz Score: Ortiz Score: 10 (9/5/2023  2:35 PM)

## 2023-10-06 ENCOUNTER — OFFICE VISIT (OUTPATIENT)
Dept: HEPATOLOGY | Facility: CLINIC | Age: 53
End: 2023-10-06
Payer: COMMERCIAL

## 2023-10-06 VITALS — HEIGHT: 74 IN | WEIGHT: 232 LBS | BODY MASS INDEX: 29.77 KG/M2

## 2023-10-06 DIAGNOSIS — K76.0 FATTY INFILTRATION OF LIVER: ICD-10-CM

## 2023-10-06 DIAGNOSIS — E78.1 HYPERTRIGLYCERIDEMIA WITHOUT HYPERCHOLESTEROLEMIA: ICD-10-CM

## 2023-10-06 DIAGNOSIS — K76.0 FATTY LIVER: Primary | ICD-10-CM

## 2023-10-06 DIAGNOSIS — Z87.898 HISTORY OF HEPATOMEGALY: ICD-10-CM

## 2023-10-06 DIAGNOSIS — E66.3 OVERWEIGHT (BMI 25.0-29.9): ICD-10-CM

## 2023-10-06 DIAGNOSIS — I10 ESSENTIAL HYPERTENSION: ICD-10-CM

## 2023-10-06 DIAGNOSIS — R73.03 PREDIABETES: ICD-10-CM

## 2023-10-06 PROCEDURE — 4010F ACE/ARB THERAPY RXD/TAKEN: CPT | Mod: CPTII,95,, | Performed by: NURSE PRACTITIONER

## 2023-10-06 PROCEDURE — 99214 OFFICE O/P EST MOD 30 MIN: CPT | Mod: 95,,, | Performed by: NURSE PRACTITIONER

## 2023-10-06 PROCEDURE — 3066F PR DOCUMENTATION OF TREATMENT FOR NEPHROPATHY: ICD-10-PCS | Mod: CPTII,95,, | Performed by: NURSE PRACTITIONER

## 2023-10-06 PROCEDURE — 3061F PR NEG MICROALBUMINURIA RESULT DOCUMENTED/REVIEW: ICD-10-PCS | Mod: CPTII,95,, | Performed by: NURSE PRACTITIONER

## 2023-10-06 PROCEDURE — 3061F NEG MICROALBUMINURIA REV: CPT | Mod: CPTII,95,, | Performed by: NURSE PRACTITIONER

## 2023-10-06 PROCEDURE — 99214 PR OFFICE/OUTPT VISIT, EST, LEVL IV, 30-39 MIN: ICD-10-PCS | Mod: 95,,, | Performed by: NURSE PRACTITIONER

## 2023-10-06 PROCEDURE — 1160F PR REVIEW ALL MEDS BY PRESCRIBER/CLIN PHARMACIST DOCUMENTED: ICD-10-PCS | Mod: CPTII,95,, | Performed by: NURSE PRACTITIONER

## 2023-10-06 PROCEDURE — 3066F NEPHROPATHY DOC TX: CPT | Mod: CPTII,95,, | Performed by: NURSE PRACTITIONER

## 2023-10-06 PROCEDURE — 3008F BODY MASS INDEX DOCD: CPT | Mod: CPTII,95,, | Performed by: NURSE PRACTITIONER

## 2023-10-06 PROCEDURE — 1159F PR MEDICATION LIST DOCUMENTED IN MEDICAL RECORD: ICD-10-PCS | Mod: CPTII,95,, | Performed by: NURSE PRACTITIONER

## 2023-10-06 PROCEDURE — 4010F PR ACE/ARB THEARPY RXD/TAKEN: ICD-10-PCS | Mod: CPTII,95,, | Performed by: NURSE PRACTITIONER

## 2023-10-06 PROCEDURE — 3044F HG A1C LEVEL LT 7.0%: CPT | Mod: CPTII,95,, | Performed by: NURSE PRACTITIONER

## 2023-10-06 PROCEDURE — 3008F PR BODY MASS INDEX (BMI) DOCUMENTED: ICD-10-PCS | Mod: CPTII,95,, | Performed by: NURSE PRACTITIONER

## 2023-10-06 PROCEDURE — 1160F RVW MEDS BY RX/DR IN RCRD: CPT | Mod: CPTII,95,, | Performed by: NURSE PRACTITIONER

## 2023-10-06 PROCEDURE — 3044F PR MOST RECENT HEMOGLOBIN A1C LEVEL <7.0%: ICD-10-PCS | Mod: CPTII,95,, | Performed by: NURSE PRACTITIONER

## 2023-10-06 PROCEDURE — 1159F MED LIST DOCD IN RCRD: CPT | Mod: CPTII,95,, | Performed by: NURSE PRACTITIONER

## 2023-10-06 NOTE — PROGRESS NOTES
The patient location is: [place]  The chief complaint leading to consultation is: see below    Visit type: audiovisual    Face to Face time with patient: 30 minutes of total time spent on the encounter, which includes face to face time and non-face to face time preparing to see the patient (eg, review of tests), Obtaining and/or reviewing separately obtained history, Documenting clinical information in the electronic or other health record, Independently interpreting results (not separately reported) and communicating results to the patient/family/caregiver, or Care coordination (not separately reported).         Each patient to whom he or she provides medical services by telemedicine is:  (1) informed of the relationship between the physician and patient and the respective role of any other health care provider with respect to management of the patient; and (2) notified that he or she may decline to receive medical services by telemedicine and may withdraw from such care at any time.    Notes:      [facility] VISIT NEW PT NOTE    REFERRING PROVIDER:  Tessy Benito  PCP: Kylah Mosqueda MD     CHIEF COMPLAINT: fatty liver     HPI: This is a 53 y.o. male with PMH noted below, presenting for evaluation of above    Previous serologic w/u negative for viral hepatitis C - will do Hep B testing     Prior serologic workup:   Lab Results   Component Value Date    HEPCAB Negative 02/17/2022     Risk factors for fatty liver include  overweight, HTN, HLD, preDM    Liver fibrosis staging:  -- fibroscan with RTC    Interval HPI: Presents today alone via video visit. Doing great with lifestyle changes in the past 6 months. Highest weight 276 lbs, current wt 232 lbs   No SSB  Stopped sweet tea ~6 months ago  No  fried/fast foods   Following low carb    Labs done 5/2023 show normal transaminase levels     Lab Results   Component Value Date    ALT 31 05/15/2023    AST 14 05/15/2023    ALKPHOS 109 05/15/2023     "BILITOT 0.5 05/15/2023    ALBUMIN 4.1 05/15/2023     05/15/2023       Abd U/S done 7/2023 showed fatty liver, no splenomegaly    Denies family history of liver disease . Rare Alcohol consumption, see below  Social History     Substance and Sexual Activity   Alcohol Use Yes    Comment: 1-2 t imes per year       Immunity to Hep A and B - will check with next labs          Allergy and medication list reviewed and updated     PMHX:  has a past medical history of Colon polyp, Diverticular stricture, Diverticulitis, Essential hypertension (05/07/2021), GERD (gastroesophageal reflux disease), Helicobacter positive gastritis (04/04/2018), Known health problems: none, and Other emphysema (06/08/2023).    PSHX:  has a past surgical history that includes Appendectomy; Colonoscopy (03/26/2018); Cystoscopy w/ ureteral stent placement (Bilateral, 09/12/2018); Colonoscopy (N/A, 09/17/2018); Colectomy (N/A, 09/18/2018); Colonoscopic surgical procedure (09/18/2018); Bladder repair (09/18/2018); Low anterior resection of colon (09/18/2018); Cystoscopy w/ ureteral stent removal (Bilateral, 10/31/2018); Cystoscopy w/ retrogrades (Bilateral, 10/31/2018); Tonsillectomy; Stomach surgery; Colostomy; and Esophagogastroduodenoscopy (N/A, 9/5/2023).    FAMILY HISTORY: Updated and reviewed in EPIC    ROS:   GENERAL: Denies fatigue  CARDIOVASCULAR: Denies edema  GI: Denies abdominal pain  SKIN: Denies rash, itching   NEURO: Denies confusion, memory loss, or mood changes    PHYSICAL EXAM:   Friendly White male, in no acute distress; alert and oriented to person, place and time  VITALS: Ht 6' 2" (1.88 m)   Wt 105.2 kg (232 lb)   BMI 29.79 kg/m²   EYES: Sclerae anicteric  GI: Soft, non-tender, non-distended. No ascites.  EXTREMITIES:  No edema.  SKIN: Warm and dry. No jaundice. No telangectasias noted. No palmar erythema.  NEURO:  No asterixis.  PSYCH:  Thought and speech pattern appropriate. Behavior normal      EDUCATION:  See " instructions discussed with patient in Instructions section of the After Visit Summary     ASSESSMENT & PLAN:  53 y.o. male with:  1.  Fatty liver, likely related to metabolic risk factors   - see HPI  -- Immunity to Hep A and B : see HPI  -- Fibroscan with RTC  -- Recommendations discussed with patient:  Limit alcohol consumption  2 Continue Weight loss, next goal of 20 lbs  3. Low carb/sugar, high fiber and protein diet, limit your carb intake to LESS than 30-45 grams of carbs with a meal or LESS than 5-10 grams with any snack   4. Exercise, 5 days per week, 30 minutes per day, as tolerated  5. Recommend good cholesterol, blood pressure, blood sugar levels     2.  Overweight, HTN, HLD, preDM   -- Body mass index is 29.79 kg/m².   -- increases risk for fatty liver            Follow up in about 6 months (around 4/6/2024). with labs and fibroscan before  Orders Placed This Encounter   Procedures    FibroScan [place] (Vibration Controlled Transient Elastography)    Hepatic Function Panel    Hepatitis A antibody, IgG    Hepatitis B Core Antibody, Total    Hepatitis B Surface Ab, Qualitative    Hepatitis B Surface Antigen        Thank you for allowing me to participate in the care of JAMAR Ochoa    I spent a total of 30 minutes on the day of the visit.This includes face to face time and non-face to face time preparing to see the patient (eg, review of tests), obtaining and/or reviewing separately obtained history, documenting clinical information in the electronic or other health record, independently interpreting results and communicating results to the patient/family/caregiver, and coordinating care.         CC'ed note to:   Tessy Benito NP Theriot, Christie H., MD

## 2023-10-06 NOTE — PATIENT INSTRUCTIONS
1. Fibroscan to look for fat or scar tissue in the liver with return to clinic   2. Will check immunity markers for Hepatitis A and B and arrange for vaccination if needed  3. Labs before return to clinic to  check for multiple causes of liver disease. These labs will release to you as soon as they are resulted but we will discuss them in detail at your upcoming visit to discuss what the lab results mean.   4.  Follow up in 2 months with fibroscan same day     There is no FDA approved therapy for fatty liver disease. Therefore, these things are important:  Limit alcohol consumption, none until further notice   2 Continue Weight loss, next goal of 20 lbs  3. Low carb/sugar and high protein diet (~1 g/kg/day of protein).Try to limit your carb intake to LESS than 30-45 grams of carbs with a meal or LESS than 5-10 grams with any snack (total of any snack foods eaten during that time). Use MyFitness Pal or Lose It doc to add up your carbs through the day. Do NOT drink any beverages with calories or carbs (this can lead to high blood sugar and weight gain). Also, some of our patients have been very successful with weight loss using the pre made/planned meal planning services that limit calories and portion size  ( The main thing to look for is low calorie, high protein, low carb)  4. Exercise, 5 days per week, 30 minutes per day, as tolerated  5. Recommend good cholesterol, blood pressure, blood sugar levels     In some people, fatty liver can progress to steatohepatitis (inflamatory fatty liver) and possibly to cirrhosis, putting one at increased risk for liver cancer, liver failure, and death. Therefore, the lifestyle changes are very important to decrease this risk.     Website with information about fatty liver and inflammation related to fatty liver (GONZALES) = www.nashtruth.com  AND www.NASHactually.com

## 2023-10-16 ENCOUNTER — TELEPHONE (OUTPATIENT)
Dept: GASTROENTEROLOGY | Facility: CLINIC | Age: 53
End: 2023-10-16
Payer: COMMERCIAL

## 2023-10-16 NOTE — TELEPHONE ENCOUNTER
Call placed to Mr. Brower in regards to message received. He stated that the Carafate Dr. Owen prescribed for him was working, but then once he completed the Rx he started back with the belching. He would like to know if he can have another Rx of Carafate, or what the next recommendations would be. I advised I would send a message to Dr. Owen for when she returns tomorrow. He verbalized understanding. No further issues noted.

## 2023-10-16 NOTE — TELEPHONE ENCOUNTER
----- Message from Laverne Larsen sent at 10/16/2023  2:58 PM CDT -----  Regarding: advice  Contact: Patient  Type: Needs Medical Advice  Who Called:  Patient  Symptoms (please be specific):    How long has patient had these symptoms:    Pharmacy name and phone #:    [facility] [address] [place] [address], [place] - [address] [address]  Phone: [phone] Fax: [phone]      Best Call Back Number: [phone]  Additional Information: Patient states he was prescribed fucralfape and they worked for a while but all of his symptoms came back and he was wondering if he could get another prescription ordered. Please call patient if any questions. Thanks!

## 2023-10-19 ENCOUNTER — PATIENT MESSAGE (OUTPATIENT)
Dept: ADMINISTRATIVE | Facility: OTHER | Age: 53
End: 2023-10-19
Payer: COMMERCIAL

## 2023-10-30 DIAGNOSIS — I10 ESSENTIAL HYPERTENSION: ICD-10-CM

## 2023-10-30 NOTE — TELEPHONE ENCOUNTER
No care due was identified.  [facility] Embedded Care Due Messages. Reference number: [id number].   10/30/2023 10:21:43 AM CDT

## 2023-10-30 NOTE — TELEPHONE ENCOUNTER
Refill Routing Note   Medication(s) are not appropriate for processing by [facility] for the following reason(s):      Required vitals abnormal: 130/94    [facility] action(s):  Defer Care Due:  None identified          Appointments  past 12m or future 3m with PCP    Date Provider   Last Visit   6/14/2023 Kylah Mosqueda MD   Next Visit   Visit date not found Kylah Mosqueda MD   ED visits in past 90 days: 0        Note composed:11:45 AM 10/30/2023            6

## 2023-10-30 NOTE — TELEPHONE ENCOUNTER
----- Message from Destini Pringle, Patient Care Assistant sent at 10/30/2023 10:23 AM CDT -----  Contact: self  Type:  RX Refill Request    Who Called:  self   Refill or New Rx:   refill  RX Name and Strength:  losartan (COZAAR) 50 MG tablet  How is the patient currently taking it? (ex. 1XDay):   as directed  Is this a 30 day or 90 day RX:  90  Preferred Pharmacy with phone number:    [facility] [address] [place], [place] - [address] [place] [address]  Phone: [phone] Fax: [phone]  Local or Mail Order:  local  Ordering Provider:  Dr Mosqueda  Best Call Back Number:  [phone]  Additional Information:  thanks

## 2023-10-31 RX ORDER — LOSARTAN POTASSIUM 50 MG/1
50 TABLET ORAL
Qty: 90 TABLET | Refills: 0 | Status: SHIPPED | OUTPATIENT
Start: 2023-10-31 | End: 2024-01-24

## 2024-01-24 DIAGNOSIS — I10 ESSENTIAL HYPERTENSION: ICD-10-CM

## 2024-01-24 RX ORDER — LOSARTAN POTASSIUM 50 MG/1
50 TABLET ORAL
Qty: 90 TABLET | Refills: 1 | Status: SHIPPED | OUTPATIENT
Start: 2024-01-24

## 2024-01-24 NOTE — TELEPHONE ENCOUNTER
Refill Routing Note   Medication(s) are not appropriate for processing by [facility] for the following reason(s):        Required vitals abnormal    [facility] action(s):  Defer               Appointments  past 12m or future 3m with PCP    Date Provider   Last Visit   6/14/2023 Kylah Mosqueda MD   Next Visit   Visit date not found Kylah Mosqueda MD   ED visits in past 90 days: 0        Note composed:11:01 AM 01/24/2024

## 2024-01-24 NOTE — TELEPHONE ENCOUNTER
No care due was identified.  [facility] Embedded Care Due Messages. Reference number: [id number].   1/24/2024 6:11:11 AM CST

## 2024-02-28 ENCOUNTER — TELEPHONE (OUTPATIENT)
Dept: HEPATOLOGY | Facility: CLINIC | Age: 54
End: 2024-02-28
Payer: COMMERCIAL

## 2024-02-28 NOTE — TELEPHONE ENCOUNTER
Returned pts call. Pt wanted to schedule appts in [place]. Pt is aware of appt being w/ a different provider. Pt vu and appts r/s. No further questions     ----- Message from Destiny Duarte NP sent at 2/28/2024 11:47 AM CST -----  Regarding: RE: call back  Maame is def fine, fibroscan same day before his appt   ----- Message -----  From: Clara Leung MA  Sent: 2/28/2024   8:34 AM CST  To: Destiny Duarte NP  Subject: FW: call back                                    Pt is requesting to r/s and to be seen in [place]. Informed pt the next available w/ [facility], pt would like to know if August is okay to be seen  ----- Message -----  From: Yosi Stallings MA  Sent: 2/27/2024   4:46 PM CST  To: Clara Leung MA  Subject: FW: call back                                      ----- Message -----  From: Magdy Rosenberg  Sent: 2/27/2024   4:30 PM CST  To: Felicia Dixon Staff  Subject: call back                                        Pt call to reschedule appt pt would like to go to [place] requesting call back    Call

## 2024-03-01 ENCOUNTER — LAB VISIT (OUTPATIENT)
Dept: LAB | Facility: HOSPITAL | Age: 54
End: 2024-03-01
Attending: NURSE PRACTITIONER
Payer: COMMERCIAL

## 2024-03-01 DIAGNOSIS — K76.0 FATTY INFILTRATION OF LIVER: ICD-10-CM

## 2024-03-01 LAB
ALBUMIN SERPL BCP-MCNC: 4.2 G/DL (ref 3.5–5.2)
ALP SERPL-CCNC: 114 U/L (ref 55–135)
ALT SERPL W/O P-5'-P-CCNC: 21 U/L (ref 10–44)
AST SERPL-CCNC: 15 U/L (ref 10–40)
BILIRUB DIRECT SERPL-MCNC: 0.1 MG/DL (ref 0.1–0.3)
BILIRUB SERPL-MCNC: 0.4 MG/DL (ref 0.1–1)
HAV IGG SER QL IA: NORMAL
HBV CORE AB SERPL QL IA: NORMAL
HBV SURFACE AB SER-ACNC: <3 MIU/ML
HBV SURFACE AB SER-ACNC: NORMAL M[IU]/ML
HBV SURFACE AG SERPL QL IA: NORMAL
PROT SERPL-MCNC: 6.9 G/DL (ref 6–8.4)

## 2024-03-01 PROCEDURE — 80076 HEPATIC FUNCTION PANEL: CPT | Performed by: NURSE PRACTITIONER

## 2024-03-01 PROCEDURE — 86704 HEP B CORE ANTIBODY TOTAL: CPT | Performed by: NURSE PRACTITIONER

## 2024-03-01 PROCEDURE — 36415 COLL VENOUS BLD VENIPUNCTURE: CPT | Performed by: NURSE PRACTITIONER

## 2024-03-01 PROCEDURE — 86706 HEP B SURFACE ANTIBODY: CPT | Performed by: NURSE PRACTITIONER

## 2024-03-01 PROCEDURE — 87340 HEPATITIS B SURFACE AG IA: CPT | Performed by: NURSE PRACTITIONER

## 2024-03-01 PROCEDURE — 86790 VIRUS ANTIBODY NOS: CPT | Performed by: NURSE PRACTITIONER

## 2024-03-05 ENCOUNTER — PATIENT MESSAGE (OUTPATIENT)
Dept: HEPATOLOGY | Facility: CLINIC | Age: 54
End: 2024-03-05
Payer: COMMERCIAL

## 2024-03-05 DIAGNOSIS — Z23 NEED FOR HEPATITIS A AND B VACCINATION: Primary | ICD-10-CM

## 2024-04-30 ENCOUNTER — PATIENT MESSAGE (OUTPATIENT)
Dept: ADMINISTRATIVE | Facility: OTHER | Age: 54
End: 2024-04-30
Payer: COMMERCIAL

## 2024-05-17 ENCOUNTER — OFFICE VISIT (OUTPATIENT)
Dept: FAMILY MEDICINE | Facility: CLINIC | Age: 54
End: 2024-05-17
Payer: COMMERCIAL

## 2024-05-17 ENCOUNTER — LAB VISIT (OUTPATIENT)
Dept: LAB | Facility: HOSPITAL | Age: 54
End: 2024-05-17
Attending: STUDENT IN AN ORGANIZED HEALTH CARE EDUCATION/TRAINING PROGRAM
Payer: COMMERCIAL

## 2024-05-17 VITALS
OXYGEN SATURATION: 98 % | SYSTOLIC BLOOD PRESSURE: 132 MMHG | HEART RATE: 80 BPM | HEIGHT: 74 IN | DIASTOLIC BLOOD PRESSURE: 84 MMHG | WEIGHT: 236 LBS | BODY MASS INDEX: 30.29 KG/M2 | RESPIRATION RATE: 15 BRPM

## 2024-05-17 DIAGNOSIS — R73.03 PREDIABETES: ICD-10-CM

## 2024-05-17 DIAGNOSIS — E78.1 HYPERTRIGLYCERIDEMIA WITHOUT HYPERCHOLESTEROLEMIA: ICD-10-CM

## 2024-05-17 DIAGNOSIS — Z12.5 SCREENING FOR PROSTATE CANCER: ICD-10-CM

## 2024-05-17 DIAGNOSIS — K21.00 GASTROESOPHAGEAL REFLUX DISEASE WITH ESOPHAGITIS WITHOUT HEMORRHAGE: ICD-10-CM

## 2024-05-17 DIAGNOSIS — I10 ESSENTIAL HYPERTENSION: ICD-10-CM

## 2024-05-17 DIAGNOSIS — F17.200 SMOKER: ICD-10-CM

## 2024-05-17 DIAGNOSIS — E66.9 OBESITY (BMI 30-39.9): ICD-10-CM

## 2024-05-17 DIAGNOSIS — I10 ESSENTIAL HYPERTENSION: Primary | ICD-10-CM

## 2024-05-17 DIAGNOSIS — F17.210 NICOTINE DEPENDENCE, CIGARETTES, UNCOMPLICATED: ICD-10-CM

## 2024-05-17 PROBLEM — K21.9 GASTROESOPHAGEAL REFLUX DISEASE: Status: ACTIVE | Noted: 2024-05-17

## 2024-05-17 LAB
ALBUMIN SERPL BCP-MCNC: 4.2 G/DL (ref 3.5–5.2)
ALBUMIN/CREAT UR: 7.3 UG/MG (ref 0–30)
ALP SERPL-CCNC: 112 U/L (ref 55–135)
ALT SERPL W/O P-5'-P-CCNC: 21 U/L (ref 10–44)
ANION GAP SERPL CALC-SCNC: 11 MMOL/L (ref 8–16)
AST SERPL-CCNC: 16 U/L (ref 10–40)
BASOPHILS # BLD AUTO: 0.09 K/UL (ref 0–0.2)
BASOPHILS NFR BLD: 0.7 % (ref 0–1.9)
BILIRUB SERPL-MCNC: 0.7 MG/DL (ref 0.1–1)
BUN SERPL-MCNC: 17 MG/DL (ref 6–20)
CALCIUM SERPL-MCNC: 9.3 MG/DL (ref 8.7–10.5)
CHLORIDE SERPL-SCNC: 106 MMOL/L (ref 95–110)
CHOLEST SERPL-MCNC: 193 MG/DL (ref 120–199)
CHOLEST/HDLC SERPL: 5.8 {RATIO} (ref 2–5)
CO2 SERPL-SCNC: 22 MMOL/L (ref 23–29)
COMPLEXED PSA SERPL-MCNC: 2.4 NG/ML (ref 0–4)
CREAT SERPL-MCNC: 1.3 MG/DL (ref 0.5–1.4)
CREAT UR-MCNC: 400 MG/DL (ref 23–375)
DIFFERENTIAL METHOD BLD: ABNORMAL
EOSINOPHIL # BLD AUTO: 0.1 K/UL (ref 0–0.5)
EOSINOPHIL NFR BLD: 0.8 % (ref 0–8)
ERYTHROCYTE [DISTWIDTH] IN BLOOD BY AUTOMATED COUNT: 13.1 % (ref 11.5–14.5)
EST. GFR  (NO RACE VARIABLE): >60 ML/MIN/1.73 M^2
ESTIMATED AVG GLUCOSE: 103 MG/DL (ref 68–131)
GLUCOSE SERPL-MCNC: 95 MG/DL (ref 70–110)
HBA1C MFR BLD: 5.2 % (ref 4–5.6)
HCT VFR BLD AUTO: 44.8 % (ref 40–54)
HDLC SERPL-MCNC: 33 MG/DL (ref 40–75)
HDLC SERPL: 17.1 % (ref 20–50)
HGB BLD-MCNC: 15.2 G/DL (ref 14–18)
IMM GRANULOCYTES # BLD AUTO: 0.05 K/UL (ref 0–0.04)
IMM GRANULOCYTES NFR BLD AUTO: 0.4 % (ref 0–0.5)
LDLC SERPL CALC-MCNC: 117.8 MG/DL (ref 63–159)
LYMPHOCYTES # BLD AUTO: 2.8 K/UL (ref 1–4.8)
LYMPHOCYTES NFR BLD: 20.9 % (ref 18–48)
MCH RBC QN AUTO: 28.9 PG (ref 27–31)
MCHC RBC AUTO-ENTMCNC: 33.9 G/DL (ref 32–36)
MCV RBC AUTO: 85 FL (ref 82–98)
MICROALBUMIN UR DL<=1MG/L-MCNC: 29 UG/ML
MONOCYTES # BLD AUTO: 0.8 K/UL (ref 0.3–1)
MONOCYTES NFR BLD: 6.2 % (ref 4–15)
NEUTROPHILS # BLD AUTO: 9.3 K/UL (ref 1.8–7.7)
NEUTROPHILS NFR BLD: 71 % (ref 38–73)
NONHDLC SERPL-MCNC: 160 MG/DL
NRBC BLD-RTO: 0 /100 WBC
PLATELET # BLD AUTO: 331 K/UL (ref 150–450)
PMV BLD AUTO: 9.8 FL (ref 9.2–12.9)
POTASSIUM SERPL-SCNC: 4.4 MMOL/L (ref 3.5–5.1)
PROT SERPL-MCNC: 6.9 G/DL (ref 6–8.4)
RBC # BLD AUTO: 5.26 M/UL (ref 4.6–6.2)
SODIUM SERPL-SCNC: 139 MMOL/L (ref 136–145)
TRIGL SERPL-MCNC: 211 MG/DL (ref 30–150)
TSH SERPL DL<=0.005 MIU/L-ACNC: 2.44 UIU/ML (ref 0.4–4)
WBC # BLD AUTO: 13.13 K/UL (ref 3.9–12.7)

## 2024-05-17 PROCEDURE — 84443 ASSAY THYROID STIM HORMONE: CPT | Performed by: STUDENT IN AN ORGANIZED HEALTH CARE EDUCATION/TRAINING PROGRAM

## 2024-05-17 PROCEDURE — 3079F DIAST BP 80-89 MM HG: CPT | Mod: S$GLB,,, | Performed by: STUDENT IN AN ORGANIZED HEALTH CARE EDUCATION/TRAINING PROGRAM

## 2024-05-17 PROCEDURE — 4010F ACE/ARB THERAPY RXD/TAKEN: CPT | Mod: S$GLB,,, | Performed by: STUDENT IN AN ORGANIZED HEALTH CARE EDUCATION/TRAINING PROGRAM

## 2024-05-17 PROCEDURE — 36415 COLL VENOUS BLD VENIPUNCTURE: CPT | Performed by: STUDENT IN AN ORGANIZED HEALTH CARE EDUCATION/TRAINING PROGRAM

## 2024-05-17 PROCEDURE — 84153 ASSAY OF PSA TOTAL: CPT | Performed by: STUDENT IN AN ORGANIZED HEALTH CARE EDUCATION/TRAINING PROGRAM

## 2024-05-17 PROCEDURE — 83036 HEMOGLOBIN GLYCOSYLATED A1C: CPT | Performed by: STUDENT IN AN ORGANIZED HEALTH CARE EDUCATION/TRAINING PROGRAM

## 2024-05-17 PROCEDURE — 3008F BODY MASS INDEX DOCD: CPT | Mod: S$GLB,,, | Performed by: STUDENT IN AN ORGANIZED HEALTH CARE EDUCATION/TRAINING PROGRAM

## 2024-05-17 PROCEDURE — 85025 COMPLETE CBC W/AUTO DIFF WBC: CPT | Performed by: STUDENT IN AN ORGANIZED HEALTH CARE EDUCATION/TRAINING PROGRAM

## 2024-05-17 PROCEDURE — 80061 LIPID PANEL: CPT | Performed by: STUDENT IN AN ORGANIZED HEALTH CARE EDUCATION/TRAINING PROGRAM

## 2024-05-17 PROCEDURE — 99999 PR PBB SHADOW E&M-EST. PATIENT-LVL III: CPT | Mod: PBBFAC,,, | Performed by: STUDENT IN AN ORGANIZED HEALTH CARE EDUCATION/TRAINING PROGRAM

## 2024-05-17 PROCEDURE — 80053 COMPREHEN METABOLIC PANEL: CPT | Performed by: STUDENT IN AN ORGANIZED HEALTH CARE EDUCATION/TRAINING PROGRAM

## 2024-05-17 PROCEDURE — 99214 OFFICE O/P EST MOD 30 MIN: CPT | Mod: S$GLB,,, | Performed by: STUDENT IN AN ORGANIZED HEALTH CARE EDUCATION/TRAINING PROGRAM

## 2024-05-17 PROCEDURE — 3075F SYST BP GE 130 - 139MM HG: CPT | Mod: S$GLB,,, | Performed by: STUDENT IN AN ORGANIZED HEALTH CARE EDUCATION/TRAINING PROGRAM

## 2024-05-17 PROCEDURE — 82043 UR ALBUMIN QUANTITATIVE: CPT | Performed by: STUDENT IN AN ORGANIZED HEALTH CARE EDUCATION/TRAINING PROGRAM

## 2024-05-17 PROCEDURE — 1159F MED LIST DOCD IN RCRD: CPT | Mod: S$GLB,,, | Performed by: STUDENT IN AN ORGANIZED HEALTH CARE EDUCATION/TRAINING PROGRAM

## 2024-05-17 RX ORDER — PANTOPRAZOLE SODIUM 40 MG/1
40 TABLET, DELAYED RELEASE ORAL DAILY
Qty: 90 TABLET | Refills: 3 | Status: SHIPPED | OUTPATIENT
Start: 2024-05-17 | End: 2024-05-17

## 2024-05-17 RX ORDER — PANTOPRAZOLE SODIUM 40 MG/1
40 TABLET, DELAYED RELEASE ORAL 2 TIMES DAILY
Qty: 180 TABLET | Refills: 3 | Status: SHIPPED | OUTPATIENT
Start: 2024-05-17

## 2024-05-17 RX ORDER — BUPROPION HYDROCHLORIDE 150 MG/1
300 TABLET ORAL DAILY
Qty: 90 TABLET | Refills: 1 | Status: SHIPPED | OUTPATIENT
Start: 2024-05-17 | End: 2025-05-17

## 2024-05-17 RX ORDER — SUCRALFATE 1 G/1
1 TABLET ORAL 4 TIMES DAILY
Qty: 56 TABLET | Refills: 0 | Status: SHIPPED | OUTPATIENT
Start: 2024-05-17

## 2024-05-17 NOTE — ASSESSMENT & PLAN NOTE
Lab Results   Component Value Date    HGBA1C 5.7 (H) 05/15/2023     Repeat and follow  He has been losing weight and hopefully this will help

## 2024-05-17 NOTE — ASSESSMENT & PLAN NOTE
Stable. Continue current medications and regular followup.  Hypertension Medications               losartan (COZAAR) 50 MG tablet Take 1 tablet by mouth once daily

## 2024-05-17 NOTE — PROGRESS NOTES
Subjective:       Patient ID: Paolo Brower is a 53 y.o. male.    Chief Complaint: Annual Exam, Nicotine Dependence, and GI Problem    Obesity  He started eating salads and is doing very well  Wt Readings from Last 10 Encounters:  05/17/24 : 107 kg (236 lb)  10/06/23 : 105.2 kg (232 lb)  09/05/23 : 109.8 kg (242 lb)  08/07/23 : 118.4 kg (261 lb)  07/14/23 : 120.9 kg (266 lb 8.6 oz)  07/05/23 : 121.3 kg (267 lb 6.7 oz)  07/03/23 : 124.3 kg (274 lb)  06/14/23 : 124.4 kg (274 lb 4 oz)  06/08/23 : 125.6 kg (276 lb 14.4 oz)  05/12/23 : 126.7 kg (279 lb 6.4 oz)    He has lost a significant amount of weight and trying to continue his weight loss.    Pt is having a hard time with gasttitis  He has an achy feeling in the morning  At the end of the night he is constantly belching.  He was given sucralfate when he was diagnosed.  Building when he was eating spicy foods.    Tobacco use  He was given medication and then never took it.  He lost the prescription and never got it filled        .  Patient Active Problem List   Diagnosis    Family history of colon cancer    Hyperplastic colonic polyp    Colovesical fistula    Essential hypertension, dx 5/2021    Lung nodules    Family history of prostate cancer    Prediabetes    Shoulder pain    Hypertriglyceridemia without hypercholesterolemia    Other emphysema    Smoker, started age 18, up to 1 ppd, how half ppd    COVID-19, single vaccination, 3/2023, no residual now    Obesity (BMI 30-39.9)    Cardiovascular event risk, ASCVD 10-year risk 11%, 2023    Shortness of breath    Nicotine dependence, cigarettes, in remission    Fatty liver    Long COVID    Gastroesophageal reflux disease with esophagitis without hemorrhage     Paolo has a current medication list which includes the following prescription(s): losartan, bupropion, pantoprazole, and sucralfate.    Review of Systems   Constitutional:  Negative for activity change and appetite change.   Respiratory:  Negative for  shortness of breath.    Cardiovascular:  Negative for chest pain.   Gastrointestinal:  Positive for reflux. Negative for abdominal pain.   Genitourinary:  Negative for dysuria.   Integumentary:  Negative for rash.   Psychiatric/Behavioral:  Negative for dysphoric mood and sleep disturbance. The patient is not nervous/anxious.          Health Maintenance Due   Topic Date Due    Pneumococcal Vaccines (Age 0-64) (1 of 2 - PCV) Never done    Shingles Vaccine (1 of 2) Never done    COVID-19 Vaccine (1 - 2023-24 season) Never done    Hemoglobin A1c (Prediabetes)  05/15/2024      Health Maintenance reviewed and discussed- labs ordered, encouraged vaccines.     Objective:      Physical Exam  Constitutional:       General: He is not in acute distress.     Appearance: Normal appearance. He is not ill-appearing.   Eyes:      Conjunctiva/sclera: Conjunctivae normal.   Cardiovascular:      Rate and Rhythm: Normal rate and regular rhythm.      Heart sounds: Normal heart sounds. No murmur heard.  Pulmonary:      Effort: Pulmonary effort is normal. No respiratory distress.      Breath sounds: Normal breath sounds.   Musculoskeletal:      Right lower leg: No edema.      Left lower leg: No edema.   Skin:     General: Skin is warm and dry.   Neurological:      Mental Status: He is alert. Mental status is at baseline.      Gait: Gait normal.   Psychiatric:         Mood and Affect: Mood normal.         Behavior: Behavior normal.         Thought Content: Thought content normal.         Judgment: Judgment normal.         Assessment:       1. Essential hypertension, dx 5/2021    2. Gastroesophageal reflux disease with esophagitis without hemorrhage    3. Prediabetes    4. Obesity (BMI 30-39.9)    5. Smoker, started age 18, up to 1 ppd, how half ppd    6. Nicotine dependence, cigarettes, uncomplicated    7. Hypertriglyceridemia without hypercholesterolemia    8. Screening for prostate cancer        Plan:       1. Essential hypertension, dx  5/2021  Assessment & Plan:  Stable. Continue current medications and regular followup.  Hypertension Medications               losartan (COZAAR) 50 MG tablet Take 1 tablet by mouth once daily              Orders:  -     CBC Auto Differential; Future; Expected date: 05/17/2024  -     Comprehensive Metabolic Panel; Future; Expected date: 05/17/2024    2. Gastroesophageal reflux disease with esophagitis without hemorrhage  -     sucralfate (CARAFATE) 1 gram tablet; Take 1 tablet (1 g total) by mouth 4 (four) times daily.  Dispense: 56 tablet; Refill: 0  -     Discontinue: pantoprazole (PROTONIX) 40 MG tablet; Take 1 tablet (40 mg total) by mouth once daily.  Dispense: 90 tablet; Refill: 3  -     pantoprazole (PROTONIX) 40 MG tablet; Take 1 tablet (40 mg total) by mouth 2 (two) times daily.  Dispense: 180 tablet; Refill: 3    3. Prediabetes  Assessment & Plan:  Lab Results   Component Value Date    HGBA1C 5.7 (H) 05/15/2023     Repeat and follow  He has been losing weight and hopefully this will help    Orders:  -     Microalbumin/Creatinine Ratio, Urine; Future; Expected date: 05/17/2024  -     Hemoglobin A1C; Future; Expected date: 05/17/2024  -     TSH; Future; Expected date: 05/17/2024    4. Obesity (BMI 30-39.9)  Assessment & Plan:  Continue diet and exercise  He has lost 45 lbs.        5. Smoker, started age 18, up to 1 ppd, how half ppd  Assessment & Plan:  Restart wellbutrin, set quit date, discussed nicotine replacement.  Quit line and referral    Orders:  -     Ambulatory referral/consult to Smoking Cessation Program; Future; Expected date: 05/24/2024    6. Nicotine dependence, cigarettes, uncomplicated  -     buPROPion (WELLBUTRIN XL) 150 MG TB24 tablet; Take 2 tablets (300 mg total) by mouth once daily.  Dispense: 90 tablet; Refill: 1  -     Ambulatory referral/consult to Smoking Cessation Program; Future; Expected date: 05/24/2024    7. Hypertriglyceridemia without hypercholesterolemia  -     Lipid Panel;  Future; Expected date: 05/17/2024    8. Screening for prostate cancer  -     PSA, Screening; Future; Expected date: 05/17/2024

## 2024-05-23 ENCOUNTER — PATIENT MESSAGE (OUTPATIENT)
Dept: FAMILY MEDICINE | Facility: CLINIC | Age: 54
End: 2024-05-23
Payer: COMMERCIAL

## 2024-05-23 DIAGNOSIS — D72.829 LEUKOCYTOSIS, UNSPECIFIED TYPE: Primary | ICD-10-CM

## 2024-06-06 ENCOUNTER — LAB VISIT (OUTPATIENT)
Dept: LAB | Facility: HOSPITAL | Age: 54
End: 2024-06-06
Attending: STUDENT IN AN ORGANIZED HEALTH CARE EDUCATION/TRAINING PROGRAM
Payer: COMMERCIAL

## 2024-06-06 DIAGNOSIS — D72.829 LEUKOCYTOSIS, UNSPECIFIED TYPE: ICD-10-CM

## 2024-06-06 LAB
BASOPHILS # BLD AUTO: 0.07 K/UL (ref 0–0.2)
BASOPHILS NFR BLD: 0.7 % (ref 0–1.9)
DIFFERENTIAL METHOD BLD: NORMAL
EOSINOPHIL # BLD AUTO: 0.1 K/UL (ref 0–0.5)
EOSINOPHIL NFR BLD: 1.3 % (ref 0–8)
ERYTHROCYTE [DISTWIDTH] IN BLOOD BY AUTOMATED COUNT: 13.2 % (ref 11.5–14.5)
HCT VFR BLD AUTO: 44.1 % (ref 40–54)
HGB BLD-MCNC: 14.8 G/DL (ref 14–18)
IMM GRANULOCYTES # BLD AUTO: 0.04 K/UL (ref 0–0.04)
IMM GRANULOCYTES NFR BLD AUTO: 0.4 % (ref 0–0.5)
LYMPHOCYTES # BLD AUTO: 2.7 K/UL (ref 1–4.8)
LYMPHOCYTES NFR BLD: 26.7 % (ref 18–48)
MCH RBC QN AUTO: 28.4 PG (ref 27–31)
MCHC RBC AUTO-ENTMCNC: 33.6 G/DL (ref 32–36)
MCV RBC AUTO: 85 FL (ref 82–98)
MONOCYTES # BLD AUTO: 0.6 K/UL (ref 0.3–1)
MONOCYTES NFR BLD: 6.2 % (ref 4–15)
NEUTROPHILS # BLD AUTO: 6.6 K/UL (ref 1.8–7.7)
NEUTROPHILS NFR BLD: 64.7 % (ref 38–73)
NRBC BLD-RTO: 0 /100 WBC
PLATELET # BLD AUTO: 335 K/UL (ref 150–450)
PMV BLD AUTO: 9.5 FL (ref 9.2–12.9)
RBC # BLD AUTO: 5.22 M/UL (ref 4.6–6.2)
WBC # BLD AUTO: 10.26 K/UL (ref 3.9–12.7)

## 2024-06-06 PROCEDURE — 36415 COLL VENOUS BLD VENIPUNCTURE: CPT | Performed by: STUDENT IN AN ORGANIZED HEALTH CARE EDUCATION/TRAINING PROGRAM

## 2024-06-06 PROCEDURE — 85025 COMPLETE CBC W/AUTO DIFF WBC: CPT | Performed by: STUDENT IN AN ORGANIZED HEALTH CARE EDUCATION/TRAINING PROGRAM

## 2024-07-16 DIAGNOSIS — I10 ESSENTIAL HYPERTENSION: ICD-10-CM

## 2024-07-16 RX ORDER — LOSARTAN POTASSIUM 50 MG/1
50 TABLET ORAL
Qty: 90 TABLET | Refills: 3 | Status: SHIPPED | OUTPATIENT
Start: 2024-07-16

## 2024-07-16 NOTE — TELEPHONE ENCOUNTER
No care due was identified.  [facility] Embedded Care Due Messages. Reference number: [id number].   7/16/2024 6:13:44 AM CDT

## 2024-07-16 NOTE — TELEPHONE ENCOUNTER
Refill Decision Note   Paolo Brower  is requesting a refill authorization.  Brief Assessment and Rationale for Refill:  Approve     Medication Therapy Plan:        Comments:     Note composed:7:16 AM 07/16/2024

## 2024-08-10 DIAGNOSIS — F17.210 NICOTINE DEPENDENCE, CIGARETTES, UNCOMPLICATED: ICD-10-CM

## 2024-08-10 NOTE — TELEPHONE ENCOUNTER
No care due was identified.  [facility] Embedded Care Due Messages. Reference number: [id number].   8/10/2024 4:25:35 PM CDT   no

## 2024-08-11 RX ORDER — BUPROPION HYDROCHLORIDE 150 MG/1
300 TABLET ORAL
Qty: 180 TABLET | Refills: 3 | Status: SHIPPED | OUTPATIENT
Start: 2024-08-11

## 2024-08-11 NOTE — TELEPHONE ENCOUNTER
Refill Routing Note   Medication(s) are not appropriate for processing by [facility] for the following reason(s):        New or recently adjusted medication    [facility] action(s):  Defer             Appointments  past 12m or future 3m with PCP    Date Provider   Last Visit   5/17/2024 Kylah Mosqueda MD   Next Visit   8/19/2024 Kylah Mosqueda MD   ED visits in past 90 days: 0        Note composed:3:46 PM 08/11/2024

## 2024-08-15 ENCOUNTER — OFFICE VISIT (OUTPATIENT)
Dept: CARDIOLOGY | Facility: CLINIC | Age: 54
End: 2024-08-15
Payer: COMMERCIAL

## 2024-08-15 VITALS
WEIGHT: 244 LBS | HEIGHT: 74 IN | DIASTOLIC BLOOD PRESSURE: 83 MMHG | OXYGEN SATURATION: 97 % | HEART RATE: 97 BPM | BODY MASS INDEX: 31.32 KG/M2 | SYSTOLIC BLOOD PRESSURE: 128 MMHG

## 2024-08-15 DIAGNOSIS — E78.1 HYPERTRIGLYCERIDEMIA WITHOUT HYPERCHOLESTEROLEMIA: ICD-10-CM

## 2024-08-15 DIAGNOSIS — F17.200 SMOKER: ICD-10-CM

## 2024-08-15 DIAGNOSIS — E78.5 DYSLIPIDEMIA: ICD-10-CM

## 2024-08-15 DIAGNOSIS — K76.0 NAFLD (NONALCOHOLIC FATTY LIVER DISEASE): ICD-10-CM

## 2024-08-15 DIAGNOSIS — I10 ESSENTIAL HYPERTENSION: ICD-10-CM

## 2024-08-15 DIAGNOSIS — Z91.89 CARDIOVASCULAR EVENT RISK: Primary | ICD-10-CM

## 2024-08-15 PROBLEM — R06.02 SHORTNESS OF BREATH: Status: RESOLVED | Noted: 2023-07-05 | Resolved: 2024-08-15

## 2024-08-15 PROBLEM — R73.03 PREDIABETES: Status: RESOLVED | Noted: 2023-05-16 | Resolved: 2024-08-15

## 2024-08-15 PROCEDURE — 99999 PR PBB SHADOW E&M-EST. PATIENT-LVL III: CPT | Mod: PBBFAC,,, | Performed by: INTERNAL MEDICINE

## 2024-08-15 RX ORDER — ROSUVASTATIN CALCIUM 5 MG/1
5 TABLET, COATED ORAL DAILY
Qty: 90 TABLET | Refills: 3 | Status: SHIPPED | OUTPATIENT
Start: 2024-08-15 | End: 2025-08-15

## 2024-08-15 NOTE — PROGRESS NOTES
Subjective:    Patient ID:  Paolo Brower is a 54 y.o. 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 Remodeling daughter's trailer. No problem and not limited.  Nicotine: started age 18, up to a ppd, now half ppd, quit 6/2024, still some urges.  Alcohol: none  Illicit drugs: none  Cardiac symptoms: none  Home BP: ave 140 / 89, labile 121/86 to 153/93  Medication compliance: yes, do not miss  Diet: regular, little salt  Caffeine: 1 cpd  Labs:   Lab Results   Component Value Date    TSH 2.436 05/17/2024        Lab Results   Component Value Date    HGBA1C 5.2 05/17/2024       Lab Results   Component Value Date    WBC 10.26 06/06/2024    HGB 14.8 06/06/2024    HCT 44.1 06/06/2024    MCV 85 06/06/2024     06/06/2024       CMP  Sodium   Date Value Ref Range Status   05/17/2024 139 136 - 145 mmol/L Final     Potassium   Date Value Ref Range Status   05/17/2024 4.4 3.5 - 5.1 mmol/L Final     Chloride   Date Value Ref Range Status   05/17/2024 106 95 - 110 mmol/L Final     CO2   Date Value Ref Range Status   05/17/2024 22 (L) 23 - 29 mmol/L Final     Glucose   Date Value Ref Range Status   05/17/2024 95 70 - 110 mg/dL Final     BUN   Date Value Ref Range Status   05/17/2024 17 6 - 20 mg/dL Final     Creatinine   Date Value Ref Range Status   05/17/2024 1.3 0.5 - 1.4 mg/dL Final     Calcium   Date Value Ref Range Status   05/17/2024 9.3 8.7 - 10.5 mg/dL Final     Total Protein   Date Value Ref Range Status   05/17/2024 6.9 6.0 - 8.4 g/dL Final     Albumin   Date Value Ref Range Status   05/17/2024 4.2 3.5 - 5.2 g/dL Final     Total Bilirubin   Date Value Ref Range Status  "  05/17/2024 0.7 0.1 - 1.0 mg/dL Final     Comment:     For infants and newborns, interpretation of results should be based  on gestational age, weight and in agreement with clinical  observations.    Premature Infant recommended reference ranges:  Up to 24 hours.............<8.0 mg/dL  Up to 48 hours............<12.0 mg/dL  3-5 days..................<15.0 mg/dL  6-29 days.................<15.0 mg/dL       Alkaline Phosphatase   Date Value Ref Range Status   05/17/2024 112 55 - 135 U/L Final     AST   Date Value Ref Range Status   05/17/2024 16 10 - 40 U/L Final     ALT   Date Value Ref Range Status   05/17/2024 21 10 - 44 U/L Final     Anion Gap   Date Value Ref Range Status   05/17/2024 11 8 - 16 mmol/L Final     eGFR if    Date Value Ref Range Status   02/17/2022 >60.0 >60 mL/min/1.73 m^2 Final     eGFR if non    Date Value Ref Range Status   02/17/2022 >60.0 >60 mL/min/1.73 m^2 Final     Comment:     Calculation used to obtain the estimated glomerular filtration  rate (eGFR) is the CKD-EPI equation.        @labrcntip(troponini)@  No results found for: "BNP"}   Lab Results   Component Value Date    CHOL 193 05/17/2024    CHOL 175 05/15/2023    CHOL 177 07/14/2020     Lab Results   Component Value Date    HDL 33 (L) 05/17/2024    HDL 37 (L) 05/15/2023    HDL 32 (L) 07/14/2020     Lab Results   Component Value Date    LDLCALC 117.8 05/17/2024    LDLCALC 94.4 05/15/2023    LDLCALC 82.0 07/14/2020     Lab Results   Component Value Date    TRIG 211 (H) 05/17/2024    TRIG 218 (H) 05/15/2023    TRIG 315 (H) 07/14/2020     Lab Results   Component Value Date    CHOLHDL 17.1 (L) 05/17/2024    CHOLHDL 21.1 05/15/2023    CHOLHDL 18.1 (L) 07/14/2020      Last Echo: TMET Echo 7/2023  Last stress test: 7/2023  Cardiovascular angiogram: none  ECG: NSR, rate 84, normal  Fundoscopic exam: within the past year, negative for retinopathy    In 6/2023:  WM referred for CP, fatigue, SOB post COVID in " "3/2023, only received a single vaccination, no booster. Every improve on its own in 5/2023 and pretty much back to normal. History of HTN and active smoker, have tried smoking cessation once. No premature family history for CAD nor stroke. Overall have intermediate ASCVD 10-year event risk of 11%. LDL-C baseline is < 100 but have elevated triglyceride. Started PPI with resolution of CP but still plenty of belching.    Kylah Mosqueda MD noted 5/12/2023 "Pt notes he had Covid in march  Since this time he has had chronic fatigue.  He has no energy and finds it hard to complete tasks  He has some concentration issues.  He notes chronic shortness of breath but seems worse with exertion  He will get some occasional exertional chest pains/side stitch type pains  This lasts about an hour before subsiding with rest.  He overall has tightness in his biceps and in his shoulder area.    Chest pain, unspecified type   Ambulatory referral/consult to Cardiology    COVID-19 long hauler manifesting chronic dyspnea       Ambulatory referral/consult to Pulmonology  Ambulatory referral/consult to Cardiology     X-Ray Chest PA And Lateral" 5/2023 - The lungs are clear.  No focal consolidation.  Heart size is normal.  Mediastinal contours unremarkable.    CT chest 9/2022 - The aorta is normal in caliber without calcific plaque.  No coronary artery calcifications are noted.  There are no pleural effusions.     There are few scattered emphysematous bulla in the lungs.  Mild bronchiectasis is again noted.    HPI comments: in 8/2024 return for annual review. Active without problem. Lab work show LDL-C creeping upward and discussed statin Rx.     TMET Echo 7/2023 - The left ventricle is normal in size with concentric remodeling and normal systolic function.  The estimated ejection fraction is 61%.  Normal left ventricular diastolic function.  Normal right ventricular size with normal right ventricular systolic function.  Normal central " venous pressure (3 mmHg).  The estimated PA systolic pressure is 13 mmHg.  Small pericardial effusion.  The stress echo portion of this study is negative for myocardial ischemia.  The patient's exercise capacity was normal. functional capacity of 13 METS,   The patient reached the end of the protocol.  There were no arrhythmias during stress.  The ECG portion of this study is negative for myocardial ischemia.  Mild left atrial enlargement.     Difficult windows, Lumason contrast used for wall motion analysis.    Abdominal US 7/2023 - Hepatomegaly and fatty liver infiltration    Review of Systems   Constitutional: Positive for weight loss (down 33 lbs with diet from 6/2023). Negative for diaphoresis, fever, night sweats and weight gain.   HENT:  Positive for hearing loss. Negative for nosebleeds and tinnitus.    Eyes:  Negative for visual disturbance.   Cardiovascular:  Negative for claudication, cyanosis, irregular heartbeat, leg swelling, near-syncope, orthopnea, palpitations and paroxysmal nocturnal dyspnea.   Respiratory:  Negative for cough, shortness of breath, sleep disturbances due to breathing, snoring and wheezing.         [place] score 3 today a 0, awaken refreshed. Mother have sleep apnea.   Endocrine: Negative for polydipsia and polyuria.   Hematologic/Lymphatic: Does not bruise/bleed easily.   Skin:  Negative for color change, flushing, nail changes, poor wound healing and suspicious lesions.   Musculoskeletal:  Negative for arthritis, falls, gout, joint pain, joint swelling, muscle cramps, muscle weakness and myalgias.   Gastrointestinal:  Positive for flatus. Negative for heartburn, hematemesis, hematochezia, melena and nausea.   Neurological:  Negative for disturbances in coordination, excessive daytime sleepiness, dizziness, focal weakness, headaches, light-headedness, loss of balance, numbness, vertigo and weakness.   Psychiatric/Behavioral:  Negative for depression and substance abuse. The patient  "does not have insomnia and is not nervous/anxious.         Objective:    Physical Exam  Constitutional:       Appearance: He is well-developed.      Comments: RA O2 sat 97%  Orthostatic VS: sitting 123/76, standing 128/83   HENT:      Head: Normocephalic.   Eyes:      Conjunctiva/sclera: Conjunctivae normal.      Pupils: Pupils are equal, round, and reactive to light.   Neck:      Thyroid: No thyromegaly.      Vascular: No JVD.   Cardiovascular:      Rate and Rhythm: Regular rhythm. Tachycardia present.      Pulses: Intact distal pulses.           Carotid pulses are 1+ on the right side and 1+ on the left side.       Radial pulses are 1+ on the right side and 1+ on the left side.        Femoral pulses are 1+ on the left side.       Dorsalis pedis pulses are 1+ on the right side and 1+ on the left side.        Posterior tibial pulses are 1+ on the right side and 1+ on the left side.      Heart sounds: Normal heart sounds. No murmur heard.     No friction rub. No gallop.   Pulmonary:      Effort: Pulmonary effort is normal.      Breath sounds: No rales.      Comments: Diminished breath sounds and prolong expiration.  Chest:      Chest wall: No tenderness.   Abdominal:      General: Bowel sounds are normal.      Palpations: Abdomen is soft.      Tenderness: There is no abdominal tenderness.      Comments: Waist 49.5" down to 45"   Musculoskeletal:         General: Normal range of motion.      Cervical back: Normal range of motion and neck supple.   Lymphadenopathy:      Cervical: No cervical adenopathy.   Skin:     General: Skin is warm and dry.      Findings: No rash.   Neurological:      Mental Status: He is alert and oriented to person, place, and time.           Assessment:       1. Cardiovascular event risk, ASCVD 10-year risk 11%, 2023    2. Essential hypertension, dx 5/2021    3. Smoker, started age 18, up to 1 ppd, now half ppd    4. NAFLD (nonalcoholic fatty liver disease)    5. Hypertriglyceridemia without " hypercholesterolemia    6. Dyslipidemia, baseline LDL-C 118           Plan:       Paolo was seen today for annual exam.    Diagnoses and all orders for this visit:    Cardiovascular event risk, ASCVD 10-year risk 11%, 2023  -     IN OFFICE EKG 12-LEAD (to Muse)  -     rosuvastatin (CRESTOR) 5 MG tablet; Take 1 tablet (5 mg total) by mouth once daily.  -     Lipid Panel; Future    Essential hypertension, dx 5/2021    Smoker, started age 18, up to 1 ppd, now half ppd    NAFLD (nonalcoholic fatty liver disease)  -     rosuvastatin (CRESTOR) 5 MG tablet; Take 1 tablet (5 mg total) by mouth once daily.  -     Lipid Panel; Future    Hypertriglyceridemia without hypercholesterolemia    Dyslipidemia, baseline LDL-C 118  -     rosuvastatin (CRESTOR) 5 MG tablet; Take 1 tablet (5 mg total) by mouth once daily.  -     Lipid Panel; Future    - All medical issues reviewed, continue current Rx  - Due to active smoking need to be aware of warning signs of MI and stroke given, if symptoms last more than 5 minutes, stop immediately and call 911, then chew 2-4 low-dose ASA (81 mg).  - Need low carb diet and encourage 2 not fried fatty fish meal per week.    - CV status and all medications reviewed, patient acknowledge good understanding.  - Recommend healthy living: no nicotine, healthy diet and regular exercise aiming for fitness, restorative sleep and weight control  - Need good exercise program, 4 key elements: 1. Aerobic (walking, swimming, dancing, jogging, biking, etc, 2. Muscle strengthening / resistance exercise, need to do 2-3 times weekly, 3. Stretching daily, good stretch takes a whole  total minute. 4. Balance exercise daily.   - Instruction for Mediterranean diet and heart healthy exercise given.  - Check home blood pressure, 2 days weekly, do 2 readings within 5 minutes in AM and PM, keep log for review. Target resting BP is less than 130/80.   - Weigh twice weekly, try to lose 1-2 lbs per week. Target weight loss of  5%-10%.  - Highly recommend 30-60 minutes of exercise / activities daily, can have Sunday off, with 2-3 sessions of muscle strengthening weekly. A  would be very helpful.  - Recommend at least annual cardiovascular evaluation in view of patient's significant risk factors. Patient's preference.  - Phone review / encourage use of [facility]       Total time spend including review of record prior to face-to-face visit is 30 minutes. Greater than 50% of the time was spent in counseling and coordination of care. The above assessment and plan have been discussed at length. Referring provider's note reviewed. Labs and procedure over the last 6 months reviewed. Problem List updated. Asked to bring in all active medications / pills bottles with next visit. Will send note to referring / PCP.

## 2024-08-16 LAB
OHS QRS DURATION: 100 MS
OHS QTC CALCULATION: 408 MS

## 2024-08-19 ENCOUNTER — TELEPHONE (OUTPATIENT)
Dept: ADMINISTRATIVE | Facility: OTHER | Age: 54
End: 2024-08-19
Payer: COMMERCIAL

## 2024-08-19 ENCOUNTER — OFFICE VISIT (OUTPATIENT)
Dept: FAMILY MEDICINE | Facility: CLINIC | Age: 54
End: 2024-08-19
Payer: COMMERCIAL

## 2024-08-19 VITALS
SYSTOLIC BLOOD PRESSURE: 138 MMHG | WEIGHT: 245 LBS | BODY MASS INDEX: 31.44 KG/M2 | HEIGHT: 74 IN | HEART RATE: 78 BPM | DIASTOLIC BLOOD PRESSURE: 88 MMHG | OXYGEN SATURATION: 97 % | RESPIRATION RATE: 16 BRPM

## 2024-08-19 DIAGNOSIS — K21.00 GASTROESOPHAGEAL REFLUX DISEASE WITH ESOPHAGITIS WITHOUT HEMORRHAGE: ICD-10-CM

## 2024-08-19 DIAGNOSIS — I10 ESSENTIAL HYPERTENSION: Primary | ICD-10-CM

## 2024-08-19 DIAGNOSIS — Z12.2 SCREENING FOR LUNG CANCER: ICD-10-CM

## 2024-08-19 PROCEDURE — 3079F DIAST BP 80-89 MM HG: CPT | Mod: S$GLB,,, | Performed by: STUDENT IN AN ORGANIZED HEALTH CARE EDUCATION/TRAINING PROGRAM

## 2024-08-19 PROCEDURE — 3044F HG A1C LEVEL LT 7.0%: CPT | Mod: S$GLB,,, | Performed by: STUDENT IN AN ORGANIZED HEALTH CARE EDUCATION/TRAINING PROGRAM

## 2024-08-19 PROCEDURE — 1159F MED LIST DOCD IN RCRD: CPT | Mod: S$GLB,,, | Performed by: STUDENT IN AN ORGANIZED HEALTH CARE EDUCATION/TRAINING PROGRAM

## 2024-08-19 PROCEDURE — 3066F NEPHROPATHY DOC TX: CPT | Mod: S$GLB,,, | Performed by: STUDENT IN AN ORGANIZED HEALTH CARE EDUCATION/TRAINING PROGRAM

## 2024-08-19 PROCEDURE — 3061F NEG MICROALBUMINURIA REV: CPT | Mod: S$GLB,,, | Performed by: STUDENT IN AN ORGANIZED HEALTH CARE EDUCATION/TRAINING PROGRAM

## 2024-08-19 PROCEDURE — 4010F ACE/ARB THERAPY RXD/TAKEN: CPT | Mod: S$GLB,,, | Performed by: STUDENT IN AN ORGANIZED HEALTH CARE EDUCATION/TRAINING PROGRAM

## 2024-08-19 PROCEDURE — 3008F BODY MASS INDEX DOCD: CPT | Mod: S$GLB,,, | Performed by: STUDENT IN AN ORGANIZED HEALTH CARE EDUCATION/TRAINING PROGRAM

## 2024-08-19 PROCEDURE — 99214 OFFICE O/P EST MOD 30 MIN: CPT | Mod: S$GLB,,, | Performed by: STUDENT IN AN ORGANIZED HEALTH CARE EDUCATION/TRAINING PROGRAM

## 2024-08-19 PROCEDURE — 99999 PR PBB SHADOW E&M-EST. PATIENT-LVL IV: CPT | Mod: PBBFAC,,, | Performed by: STUDENT IN AN ORGANIZED HEALTH CARE EDUCATION/TRAINING PROGRAM

## 2024-08-19 PROCEDURE — 3075F SYST BP GE 130 - 139MM HG: CPT | Mod: S$GLB,,, | Performed by: STUDENT IN AN ORGANIZED HEALTH CARE EDUCATION/TRAINING PROGRAM

## 2024-08-19 RX ORDER — PANTOPRAZOLE SODIUM 40 MG/1
40 TABLET, DELAYED RELEASE ORAL 2 TIMES DAILY
Qty: 180 TABLET | Refills: 3 | Status: SHIPPED | OUTPATIENT
Start: 2024-08-19 | End: 2024-08-19 | Stop reason: SDUPTHER

## 2024-08-19 RX ORDER — SUCRALFATE 1 G/1
1 TABLET ORAL 4 TIMES DAILY
Qty: 56 TABLET | Refills: 0 | Status: SHIPPED | OUTPATIENT
Start: 2024-08-19

## 2024-08-19 RX ORDER — PANTOPRAZOLE SODIUM 40 MG/1
40 TABLET, DELAYED RELEASE ORAL 2 TIMES DAILY
Qty: 180 TABLET | Refills: 3 | Status: SHIPPED | OUTPATIENT
Start: 2024-08-19

## 2024-08-19 NOTE — ASSESSMENT & PLAN NOTE
Previously on sucralfate and protonix  Recent worsening of gastritis symptoms  He would like to be evaluated by GI again and restart carafate as this helped the symptoms in the past.  Refill, referral, monitor

## 2024-08-19 NOTE — PROGRESS NOTES
Subjective:       Patient ID: Paolo Brower is a 54 y.o. male.    Chief Complaint: Hypertension    GERD:  Pt is compliant with current therapy (Protonix) with worsening control of symptoms. Dyspepsia, epigastric discomfort.  Denies dysphagia, hematemesis, chest pain, weight loss.          .  Patient Active Problem List   Diagnosis    Family history of colon cancer    Hyperplastic colonic polyp    Colovesical fistula    Essential hypertension, dx 5/2021    Lung nodules    Family history of prostate cancer    Shoulder pain    Hypertriglyceridemia without hypercholesterolemia    Other emphysema    Smoker, started age 18, up to 1 ppd, now half ppd, quit 6/2024    COVID-19, single vaccination, 3/2023, no residual now    Obesity (BMI 30-39.9)    Cardiovascular event risk, ASCVD 10-year risk 11%, 2023    Nicotine dependence, cigarettes, in remission    NAFLD (nonalcoholic fatty liver disease)    Long COVID    Gastroesophageal reflux disease with esophagitis without hemorrhage    Dyslipidemia, baseline LDL-C 118     Paolo has a current medication list which includes the following prescription(s): bupropion, losartan, rosuvastatin, pantoprazole, and sucralfate.    Review of Systems   Constitutional:  Negative for activity change and appetite change.   Respiratory:  Negative for shortness of breath.    Cardiovascular:  Negative for chest pain.   Gastrointestinal:  Positive for reflux. Negative for abdominal pain.   Genitourinary:  Negative for dysuria.   Integumentary:  Negative for rash.   Psychiatric/Behavioral:  Negative for sleep disturbance.          Health Maintenance Due   Topic Date Due    Pneumococcal Vaccines (Age 0-64) (1 of 2 - PCV) Never done    Shingles Vaccine (1 of 2) Never done    COVID-19 Vaccine (1 - 2023-24 season) Never done    LDCT Lung Screen  07/06/2024      Health Maintenance reviewed and discussed- low dose CT ordered.     Objective:      Physical Exam  Constitutional:       General: He is not in  acute distress.     Appearance: Normal appearance. He is not ill-appearing.   Eyes:      Conjunctiva/sclera: Conjunctivae normal.   Cardiovascular:      Rate and Rhythm: Normal rate and regular rhythm.      Heart sounds: Normal heart sounds. No murmur heard.  Pulmonary:      Effort: Pulmonary effort is normal. No respiratory distress.      Breath sounds: Normal breath sounds.   Musculoskeletal:      Right lower leg: No edema.      Left lower leg: No edema.   Skin:     General: Skin is warm and dry.   Neurological:      Mental Status: He is alert. Mental status is at baseline.      Gait: Gait normal.   Psychiatric:         Mood and Affect: Mood normal.         Behavior: Behavior normal.         Thought Content: Thought content normal.         Judgment: Judgment normal.         Assessment:       1. Essential hypertension, dx 5/2021    2. Gastroesophageal reflux disease with esophagitis without hemorrhage    3. Screening for lung cancer        Plan:       1. Essential hypertension, dx 5/2021  Assessment & Plan:  Stable. Continue current medications and regular followup.  Hypertension Medications               losartan (COZAAR) 50 MG tablet Take 1 tablet by mouth once daily                2. Gastroesophageal reflux disease with esophagitis without hemorrhage  Assessment & Plan:  Previously on sucralfate and protonix  Recent worsening of gastritis symptoms  He would like to be evaluated by GI again and restart carafate as this helped the symptoms in the past.  Refill, referral, monitor    Orders:  -     Ambulatory referral/consult to Gastroenterology; Future; Expected date: 08/26/2024  -     Discontinue: pantoprazole (PROTONIX) 40 MG tablet; Take 1 tablet (40 mg total) by mouth 2 (two) times daily.  Dispense: 180 tablet; Refill: 3  -     sucralfate (CARAFATE) 1 gram tablet; Take 1 tablet (1 g total) by mouth 4 (four) times daily.  Dispense: 56 tablet; Refill: 0  -     pantoprazole (PROTONIX) 40 MG tablet; Take 1 tablet  (40 mg total) by mouth 2 (two) times daily.  Dispense: 180 tablet; Refill: 3    3. Screening for lung cancer  -     CT Chest Lung Screening Low Dose; Future; Expected date: 08/19/2024

## 2024-08-26 ENCOUNTER — HOSPITAL ENCOUNTER (OUTPATIENT)
Dept: RADIOLOGY | Facility: HOSPITAL | Age: 54
Discharge: HOME | End: 2024-08-26
Attending: STUDENT IN AN ORGANIZED HEALTH CARE EDUCATION/TRAINING PROGRAM
Payer: COMMERCIAL

## 2024-08-26 DIAGNOSIS — Z12.2 SCREENING FOR LUNG CANCER: ICD-10-CM

## 2024-08-26 PROCEDURE — 71271 CT THORAX LUNG CANCER SCR C-: CPT | Mod: TC

## 2024-08-26 PROCEDURE — 71271 CT THORAX LUNG CANCER SCR C-: CPT | Mod: 26,,, | Performed by: RADIOLOGY

## 2024-09-27 ENCOUNTER — OFFICE VISIT (OUTPATIENT)
Dept: HEPATOLOGY | Facility: CLINIC | Age: 54
End: 2024-09-27
Payer: COMMERCIAL

## 2024-09-27 ENCOUNTER — PROCEDURE VISIT (OUTPATIENT)
Dept: HEPATOLOGY | Facility: CLINIC | Age: 54
End: 2024-09-27
Payer: COMMERCIAL

## 2024-09-27 VITALS — WEIGHT: 246.38 LBS | HEIGHT: 74 IN | BODY MASS INDEX: 31.62 KG/M2

## 2024-09-27 DIAGNOSIS — E66.9 OBESITY (BMI 30-39.9): ICD-10-CM

## 2024-09-27 DIAGNOSIS — Z87.898 HISTORY OF PREDIABETES: ICD-10-CM

## 2024-09-27 DIAGNOSIS — K76.0 FATTY LIVER: Primary | ICD-10-CM

## 2024-09-27 DIAGNOSIS — K76.0 FATTY INFILTRATION OF LIVER: ICD-10-CM

## 2024-09-27 DIAGNOSIS — E78.5 DYSLIPIDEMIA: ICD-10-CM

## 2024-09-27 DIAGNOSIS — I10 ESSENTIAL HYPERTENSION: ICD-10-CM

## 2024-09-27 PROCEDURE — 99999 PR PBB SHADOW E&M-EST. PATIENT-LVL III: CPT | Mod: PBBFAC,,, | Performed by: NURSE PRACTITIONER

## 2024-09-27 NOTE — PROGRESS NOTES
[facility] VISIT ESTABLISHED PT NOTE    REFERRING PROVIDER:  No ref. provider found  PCP: Kylah Mosqueda MD     CHIEF COMPLAINT: Fatty Liver     HPI: This is a 54 y.o. male with PMH noted below, presenting for follow up for fatty liver. He was last seen in clinic by Destiny Duarte NP for a virtual visit in 10/2023.    Previous serologic w/u negative for viral hepatitis.    Prior serologic workup:   Lab Results   Component Value Date    HEPBSAG Non-reactive 03/01/2024    HEPCAB Negative 02/17/2022     Risk factors for fatty liver include  overweight, HTN, HLD, preDM    Liver fibrosis staging:  None.    FIB-4 Calculation: 0.55 at 9/27/2024  1:15 PM   Calculated from:  Last SGOT/AST : 16 at 9/27/2024  1:15 PM  Last SGPT/ALT: 21 at 9/27/2024  1:15 PM   Platelets: 335 at 9/27/2024  1:15 PM   Age: 54 y.o.     FIB-4 below 1.30 is considered as low-risk for advanced fibrosis  FIB-4 over 2.67 is considered as high-risk for advanced fibrosis  FIB-4 values between 1.30 and 2.67 are considered as intermediate-risk of advanced fibrosis for ages 36-64.     For ages > 64 the cut-off for low-risk goes to < 2.  This is a screening tool and clinical judgement should be used in the interpretation of these results.    Interval HPI: Presents today alone. He has lost over 30 lbs over the past 1.5 years, through diet and exercise.    Labs done 5/2024 show normal transaminase levels (normalized with weight loss). HgbA1c is also improved with weight loss. He continues to have mild hypertriglyceridemia.    Abd U/S done 7/2023 showed fatty liver, no splenomegaly.    Fibroscan today to non-invasively stage liver disease is suggestive of minimal fatty infiltration of the liver (<S1), with F4 fibrosis, and a high likelihood of cirrhosis, though the exam was technically challenging.    The patient declined to schedule a MR Elastography or liver biopsy for further evaluation, and would like to continue to follow up with PCP  only for management of fatty liver disease.    He is well appearing, and has no signs or symptoms of hepatic decompensation including jaundice, dark urine, pruritus, abdominal distention, melena, hematochezia or periods of confusion suggestive of hepatic encephalopathy.    Lab Results   Component Value Date    ALT 21 05/17/2024    AST 16 05/17/2024    ALKPHOS 112 05/17/2024    BILITOT 0.7 05/17/2024    ALBUMIN 4.2 05/17/2024     06/06/2024     Abd U/S done 7/2023 showed fatty liver, no splenomegaly    Denies family history of liver disease . Rare Alcohol consumption, see below  Social History     Substance and Sexual Activity   Alcohol Use Yes    Comment: 1-2 t imes per year     Allergy and medication list reviewed and updated     PMHX:  has a past medical history of Colon polyp, Diverticular stricture, Diverticulitis, Essential hypertension (05/07/2021), GERD (gastroesophageal reflux disease), Helicobacter positive gastritis (04/04/2018), Known health problems: none, and Other emphysema (06/08/2023).    PSHX:  has a past surgical history that includes Appendectomy; Colonoscopy (03/26/2018); Cystoscopy w/ ureteral stent placement (Bilateral, 09/12/2018); Colonoscopy (N/A, 09/17/2018); Colectomy (N/A, 09/18/2018); Colonoscopic surgical procedure (09/18/2018); Bladder repair (09/18/2018); Low anterior resection of colon (09/18/2018); Cystoscopy w/ ureteral stent removal (Bilateral, 10/31/2018); Cystoscopy w/ retrogrades (Bilateral, 10/31/2018); Tonsillectomy; Stomach surgery; Colostomy; and Esophagogastroduodenoscopy (N/A, 9/5/2023).    FAMILY HISTORY: Updated and reviewed in EPIC    ROS:   GENERAL: Denies fatigue  CARDIOVASCULAR: Denies edema  GI: Denies abdominal pain  SKIN: Denies rash, itching   NEURO: Denies confusion, memory loss, or mood changes    PHYSICAL EXAM:   Friendly White male, in no acute distress; alert and oriented to person, place and time  VITALS: There were no vitals taken for this  visit.  EYES: Sclerae anicteric  GI: Soft, non-tender, non-distended. No ascites.  EXTREMITIES:  No edema.  SKIN: Warm and dry. No jaundice. No telangectasias noted. No palmar erythema.  NEURO:  No asterixis.  PSYCH:  Thought and speech pattern appropriate. Behavior normal    EDUCATION:  See instructions discussed with patient in Instructions section of the After Visit Summary     ASSESSMENT & PLAN:  54 y.o. male with:     1. Fatty liver        2. Obesity (BMI 30-39.9)        3. Dyslipidemia, baseline LDL-C 118        4. Essential hypertension, dx 5/2021        5. History of prediabetes           - Fibroscan today to non-invasively stage liver disease.  - Recommend MRE or liver biopsy for further evaluation of fibrosis stage, given elevated liver stiffness score (patient declined).  - Recommend additional weight loss goal of 25 lbs, through diet and exercise.  - Recommend good control of cholesterol, blood pressure, & blood sugar levels.  - Avoid alcohol and herbal supplements/alternative remedies.  - Return to clinic PRN.     Thank you for allowing me to participate in the care of Paolo Brower       Hepatology Nurse Practitioner  [facility]    I spent a total of 30 minutes on the day of the visit.This includes face to face time and non-face to face time preparing to see the patient (eg, review of tests), obtaining and/or reviewing separately obtained history, documenting clinical information in the electronic or other health record, independently interpreting results and communicating results to the patient/family/caregiver, and coordinating care.     CC'ed note to:   No ref. provider found  Kylah Mosqueda MD

## 2024-09-27 NOTE — PROCEDURES
FibroScan [place] (Vibration Controlled Transient Elastography)    Date/Time: 9/27/2024 11:00 AM    Performed by: Pascale Cadena NP  Authorized by: Destiny Duarte NP    Diagnosis:  NAFLD    Probe:  M    Universal Protocol: Patient's identity, procedure and site were verified, confirmatory pause was performed.  Discussed procedure including risks and potential complications.  Questions answered.  Patient verbalizes understanding and wishes to proceed with VCTE.     Procedure: After providing explanations of the procedure, patient was placed in the supine position with right arm in maximum abduction to allow optimal exposure of right lateral abdomen.  Patient was briefly assessed, Testing was performed in the mid-axillary location, 50Hz Shear Wave pulses were applied and the resulting Shear Wave and Propagation Speed detected with a 3.5 MHz ultrasonic signal, using the FibroScan probe, Skin to liver capsule distance and liver parenchyma were accessed during the entire examination with the FibroScan probe, Patient was instructed to breathe normally and to abstain from sudden movements during the procedure, allowing for random measurements of liver stiffness. At least 10 Shear Waves were produced, Individual measurements of each Shear Wave were calculated.  Patient tolerated the procedure well with no complications.  Meets discharge criteria as was dismissed.  Rates pain 0 out of 10.  Patient will follow up with ordering provider to review results.    Findings  Median liver stiffness score:  27  CAP Reading: dB/m:  273    IQR/med %:  53  Interpretation  Fibrosis interpretation is based on medial liver stiffness - Kilopascal (kPa).    Fibrosis Stage:  F4  Steatosis interpretation is based on controlled attenuation parameter - (dB/m).    Steatosis Grade:  <S1

## 2024-11-04 ENCOUNTER — LAB VISIT (OUTPATIENT)
Dept: LAB | Facility: HOSPITAL | Age: 54
End: 2024-11-04
Attending: INTERNAL MEDICINE
Payer: COMMERCIAL

## 2024-11-04 DIAGNOSIS — K76.0 NAFLD (NONALCOHOLIC FATTY LIVER DISEASE): ICD-10-CM

## 2024-11-04 DIAGNOSIS — E78.5 DYSLIPIDEMIA: ICD-10-CM

## 2024-11-04 DIAGNOSIS — Z91.89 CARDIOVASCULAR EVENT RISK: ICD-10-CM

## 2024-11-04 LAB
CHOLEST SERPL-MCNC: 145 MG/DL (ref 120–199)
CHOLEST/HDLC SERPL: 4 {RATIO} (ref 2–5)
HDLC SERPL-MCNC: 36 MG/DL (ref 40–75)
HDLC SERPL: 24.8 % (ref 20–50)
LDLC SERPL CALC-MCNC: 57.4 MG/DL (ref 63–159)
NONHDLC SERPL-MCNC: 109 MG/DL
TRIGL SERPL-MCNC: 258 MG/DL (ref 30–150)

## 2024-11-04 PROCEDURE — 36415 COLL VENOUS BLD VENIPUNCTURE: CPT | Performed by: INTERNAL MEDICINE

## 2024-11-04 PROCEDURE — 80061 LIPID PANEL: CPT | Performed by: INTERNAL MEDICINE

## 2024-11-07 DIAGNOSIS — K76.0 NAFLD (NONALCOHOLIC FATTY LIVER DISEASE): ICD-10-CM

## 2024-11-07 DIAGNOSIS — E78.5 DYSLIPIDEMIA: ICD-10-CM

## 2024-11-07 DIAGNOSIS — Z91.89 CARDIOVASCULAR EVENT RISK: ICD-10-CM

## 2024-11-08 RX ORDER — ROSUVASTATIN CALCIUM 5 MG/1
5 TABLET, COATED ORAL DAILY
Qty: 90 TABLET | Refills: 3 | Status: SHIPPED | OUTPATIENT
Start: 2024-11-08 | End: 2025-11-08

## 2024-11-09 ENCOUNTER — PATIENT MESSAGE (OUTPATIENT)
Dept: OTHER | Facility: OTHER | Age: 54
End: 2024-11-09
Payer: COMMERCIAL

## 2024-11-09 ENCOUNTER — PATIENT MESSAGE (OUTPATIENT)
Dept: ADMINISTRATIVE | Facility: OTHER | Age: 54
End: 2024-11-09
Payer: COMMERCIAL

## 2025-02-13 ENCOUNTER — TELEPHONE (OUTPATIENT)
Dept: FAMILY MEDICINE | Facility: CLINIC | Age: 55
End: 2025-02-13
Payer: COMMERCIAL

## 2025-02-13 NOTE — TELEPHONE ENCOUNTER
----- Message from Herrera Maciel sent at 2/13/2025  3:37 PM CST -----  Regarding: appointment needed  Patient needs to schedule an appointment. Was originally scheduled for 2/19/25 in the am but PT cancelled and needs an appt. Please call [phone]

## 2025-02-19 ENCOUNTER — PATIENT MESSAGE (OUTPATIENT)
Dept: INTERNAL MEDICINE | Facility: CLINIC | Age: 55
End: 2025-02-19
Payer: COMMERCIAL

## 2025-02-19 ENCOUNTER — OFFICE VISIT (OUTPATIENT)
Dept: FAMILY MEDICINE | Facility: CLINIC | Age: 55
End: 2025-02-19
Payer: COMMERCIAL

## 2025-02-19 VITALS
SYSTOLIC BLOOD PRESSURE: 132 MMHG | BODY MASS INDEX: 35.08 KG/M2 | RESPIRATION RATE: 16 BRPM | HEART RATE: 89 BPM | HEIGHT: 74 IN | DIASTOLIC BLOOD PRESSURE: 78 MMHG | WEIGHT: 273.31 LBS | OXYGEN SATURATION: 98 %

## 2025-02-19 DIAGNOSIS — Z87.898 HISTORY OF PREDIABETES: ICD-10-CM

## 2025-02-19 DIAGNOSIS — J43.8 OTHER EMPHYSEMA: ICD-10-CM

## 2025-02-19 DIAGNOSIS — K21.00 GASTROESOPHAGEAL REFLUX DISEASE WITH ESOPHAGITIS WITHOUT HEMORRHAGE: Primary | ICD-10-CM

## 2025-02-19 DIAGNOSIS — Z12.5 SCREENING FOR PROSTATE CANCER: ICD-10-CM

## 2025-02-19 DIAGNOSIS — E66.9 OBESITY (BMI 30-39.9): ICD-10-CM

## 2025-02-19 DIAGNOSIS — I10 ESSENTIAL HYPERTENSION: ICD-10-CM

## 2025-02-19 DIAGNOSIS — K21.00 GASTROESOPHAGEAL REFLUX DISEASE WITH ESOPHAGITIS WITHOUT HEMORRHAGE: ICD-10-CM

## 2025-02-19 DIAGNOSIS — E78.5 DYSLIPIDEMIA: ICD-10-CM

## 2025-02-19 DIAGNOSIS — K44.9 HIATAL HERNIA: ICD-10-CM

## 2025-02-19 DIAGNOSIS — R91.8 LUNG NODULES: ICD-10-CM

## 2025-02-19 RX ORDER — OMEPRAZOLE 40 MG/1
40 CAPSULE, DELAYED RELEASE ORAL
Qty: 180 CAPSULE | Refills: 3 | Status: SHIPPED | OUTPATIENT
Start: 2025-02-19

## 2025-02-19 RX ORDER — OMEPRAZOLE 40 MG/1
40 CAPSULE, DELAYED RELEASE ORAL
COMMUNITY
Start: 2025-01-29 | End: 2025-02-19 | Stop reason: SDUPTHER

## 2025-02-19 NOTE — ASSESSMENT & PLAN NOTE
Stable. Continue current medications and regular followup.  Hyperlipidemia Medications              rosuvastatin (CRESTOR) 5 MG tablet Take 1 tablet (5 mg total) by mouth once daily.

## 2025-02-19 NOTE — PROGRESS NOTES
Subjective:       Patient ID: Paolo Brower is a 54 y.o. male.    Chief Complaint: Follow-up    History of Present Illness    CHIEF COMPLAINT:  Mr. Brower presents today for follow up of gastritis    GASTROINTESTINAL:  He reports worsening of gastric symptoms when drinking with a straw. Deep breathing triggers excessive belching episodes. He has a history of hiatal hernia and expresses interest in repair. He takes Sucralfate once or twice daily, despite being prescribed 4 times daily.    WEIGHT MANAGEMENT:  He reports weight gain from 230 to 260 lbs over four months, attributing this to dietary changes due to gastric issues. He was primarily eating salads but discontinued due to concerns about gas and other GI symptoms. He acknowledges decreased physical activity and expresses desire to resume regular exercise, noting its past effectiveness for weight loss.    PULMONARY:  CT showed 8 of 24 small benign-appearing lung nodules measuring 2-4 mm. He qualifies for annual surveillance imaging and denies any breathing problems.       Review of Systems   Constitutional:  Negative for activity change and appetite change.   Respiratory:  Negative for shortness of breath.    Cardiovascular:  Negative for chest pain.   Gastrointestinal:  Positive for reflux. Negative for abdominal pain.   Genitourinary:  Negative for dysuria.   Integumentary:  Negative for rash.   Psychiatric/Behavioral:  Negative for sleep disturbance.       Problem List[1]  Paolo has a current medication list which includes the following prescription(s): bupropion, losartan, rosuvastatin, sucralfate, and omeprazole.        Health Maintenance Due   Topic Date Due    Pneumococcal Vaccines (Age 50+) (1 of 2 - PCV) Never done    Shingles Vaccine (1 of 2) Never done    Influenza Vaccine (1) 09/01/2024    COVID-19 Vaccine (1 - 2024-25 season) Never done      Health Maintenance reviewed and discussed- labs ordered.     Objective:      Physical  Exam  Constitutional:       General: He is not in acute distress.     Appearance: Normal appearance. He is obese. He is not ill-appearing.   Eyes:      Conjunctiva/sclera: Conjunctivae normal.   Cardiovascular:      Rate and Rhythm: Normal rate and regular rhythm.      Heart sounds: Normal heart sounds. No murmur heard.  Pulmonary:      Effort: Pulmonary effort is normal. No respiratory distress.      Breath sounds: Normal breath sounds. No wheezing or rales.   Musculoskeletal:      Right lower leg: No edema.      Left lower leg: No edema.   Skin:     General: Skin is warm and dry.   Neurological:      Mental Status: He is alert. Mental status is at baseline.      Gait: Gait normal.   Psychiatric:         Mood and Affect: Mood normal.         Behavior: Behavior normal.         Thought Content: Thought content normal.         Judgment: Judgment normal.             Assessment:       Assessment & Plan    1. Considered increasing Omeprazole to 2 times daily to address persistent gastritis symptoms  2. Evaluated potential causes of patient's gastric discomfort, including hiatal hernia  3. Will test for H. pylori as a possible underlying cause of gastritis  4. Reviewed previous CT chest results showing small benign lung nodules, qualifying patient for annual scans            Plan:       1. Gastroesophageal reflux disease with esophagitis without hemorrhage  Assessment & Plan:  Continued symptoms.  Belching.  Increasing Prilosec to bid.    Orders:  -     omeprazole (PRILOSEC) 40 MG capsule; Take 1 capsule (40 mg total) by mouth 2 (two) times daily before meals.  Dispense: 180 capsule; Refill: 3  -     H.Pylori Antibody IgG; Future; Expected date: 02/19/2025    2. History of prediabetes  Assessment & Plan:  Lab Results   Component Value Date    HGBA1C 5.2 05/17/2024     Repeat and follow      Orders:  -     Hemoglobin A1C; Future; Expected date: 02/19/2025  -     TSH; Future; Expected date: 02/19/2025    3. Obesity (BMI  30-39.9)  Assessment & Plan:  Wt Readings from Last 6 Encounters:   02/19/25 124 kg (273 lb 4.8 oz)   09/27/24 111.7 kg (246 lb 5.8 oz)   08/19/24 111.1 kg (245 lb)   08/15/24 110.7 kg (244 lb)   05/17/24 107 kg (236 lb)   10/06/23 105.2 kg (232 lb)     Refocus diet and exercise      4. Dyslipidemia, baseline LDL-C 118  Assessment & Plan:  Stable. Continue current medications and regular followup.  Hyperlipidemia Medications              rosuvastatin (CRESTOR) 5 MG tablet Take 1 tablet (5 mg total) by mouth once daily.              Orders:  -     Lipid Panel; Future; Expected date: 02/19/2025    5. Essential hypertension, dx 5/2021  Assessment & Plan:  Stable. Continue current medications and regular followup.  Hypertension Medications              losartan (COZAAR) 50 MG tablet Take 1 tablet by mouth once daily              Orders:  -     CBC Without Differential; Future; Expected date: 02/19/2025  -     Comprehensive Metabolic Panel; Future; Expected date: 02/19/2025  -     Microalbumin/Creatinine Ratio, Urine; Future; Expected date: 02/19/2025    6. Other emphysema  Assessment & Plan:  Stable. Continue current medications and regular followup.        7. Lung nodules  Overview:  2024 CT  Impression:     Lung-RADS Category:  2 - Benign Appearance or Behavior - continue annual screening with LDCT in 12 months.     Clinically or potentially clinically significant non lung cancer finding:  None.     Prior Lung Cancer Modifier:  No history of prior lung cancer.     Mild centrilobular emphysematous change.      8. Hiatal hernia  Assessment & Plan:  Interested in repair      9. Screening for prostate cancer  -     PSA, Screening; Future; Expected date: 02/19/2025         This note was generated with the assistance of ambient listening technology. Verbal consent was obtained by the patient and accompanying visitor(s) for the recording of patient appointment to facilitate this note. I attest to having reviewed and edited  the generated note for accuracy, though some syntax or spelling errors may persist. Please contact the author of this note for any clarification.                    [1]   Patient Active Problem List  Diagnosis    Family history of colon cancer    Hyperplastic colonic polyp    Colovesical fistula    Essential hypertension, dx 5/2021    Lung nodules    Family history of prostate cancer    Shoulder pain    Hypertriglyceridemia without hypercholesterolemia    Other emphysema    Smoker, started age 18, up to 1 ppd, now half ppd, quit 6/2024    COVID-19, single vaccination, 3/2023, no residual now    Obesity (BMI 30-39.9)    Cardiovascular event risk, ASCVD 10-year risk 11%, 2023    Nicotine dependence, cigarettes, in remission    Fatty liver    Long COVID    Gastroesophageal reflux disease with esophagitis without hemorrhage    Dyslipidemia, baseline LDL-C 118    History of prediabetes    Hiatal hernia

## 2025-02-19 NOTE — ASSESSMENT & PLAN NOTE
Wt Readings from Last 6 Encounters:   02/19/25 124 kg (273 lb 4.8 oz)   09/27/24 111.7 kg (246 lb 5.8 oz)   08/19/24 111.1 kg (245 lb)   08/15/24 110.7 kg (244 lb)   05/17/24 107 kg (236 lb)   10/06/23 105.2 kg (232 lb)     Refocus diet and exercise

## 2025-02-20 ENCOUNTER — LAB VISIT (OUTPATIENT)
Dept: LAB | Facility: HOSPITAL | Age: 55
End: 2025-02-20
Attending: STUDENT IN AN ORGANIZED HEALTH CARE EDUCATION/TRAINING PROGRAM
Payer: COMMERCIAL

## 2025-02-20 DIAGNOSIS — K21.00 GASTROESOPHAGEAL REFLUX DISEASE WITH ESOPHAGITIS WITHOUT HEMORRHAGE: ICD-10-CM

## 2025-02-20 PROCEDURE — 36415 COLL VENOUS BLD VENIPUNCTURE: CPT | Performed by: STUDENT IN AN ORGANIZED HEALTH CARE EDUCATION/TRAINING PROGRAM

## 2025-02-20 PROCEDURE — 86677 HELICOBACTER PYLORI ANTIBODY: CPT | Performed by: STUDENT IN AN ORGANIZED HEALTH CARE EDUCATION/TRAINING PROGRAM

## 2025-02-20 RX ORDER — SUCRALFATE 1 G/1
1 TABLET ORAL 4 TIMES DAILY
Qty: 56 TABLET | Refills: 0 | Status: SHIPPED | OUTPATIENT
Start: 2025-02-20

## 2025-02-20 NOTE — TELEPHONE ENCOUNTER
Refill Routing Note   Medication(s) are not appropriate for processing by [facility] for the following reason(s):        Outside of protocol    [facility] action(s):  Route               Appointments  past 12m or future 3m with PCP    Date Provider   Last Visit   2/19/2025 Kylah Mosqueda MD   Next Visit   Visit date not found Kylah Mosqueda MD   ED visits in past 90 days: 0        Note composed:9:29 AM 02/20/2025

## 2025-02-20 NOTE — TELEPHONE ENCOUNTER
Please see the attached refill request.  Last refill - 8/19/24  Last OV - 2/19/25  Next OV - 9/18/25

## 2025-02-21 ENCOUNTER — RESULTS FOLLOW-UP (OUTPATIENT)
Dept: FAMILY MEDICINE | Facility: CLINIC | Age: 55
End: 2025-02-21

## 2025-02-21 LAB — H PYLORI IGG SERPL QL IA: NEGATIVE

## 2025-04-27 DIAGNOSIS — K21.00 GASTROESOPHAGEAL REFLUX DISEASE WITH ESOPHAGITIS WITHOUT HEMORRHAGE: ICD-10-CM

## 2025-04-27 NOTE — TELEPHONE ENCOUNTER
Care Due:                  Date            Visit Type   Department     Provider  --------------------------------------------------------------------------------                                EP -                              PRIMARY      [facility] FAMILY  Last Visit: 02-      CARE ([facility])   MEDICINE       Kylah Mosqueda                              EP -                              PRIMARY      [facility]  Next Visit: 08-      CARE ([facility])   MEDICINE       Kylah Mosqueda                                                            Last  Test          Frequency    Reason                     Performed    Due Date  --------------------------------------------------------------------------------    CMP.........  12 months..  losartan.................  05- 05-    [facility] Embedded Care Due Messages. Reference number: [id number].   4/27/2025 8:21:49 AM CDT

## 2025-04-28 ENCOUNTER — PATIENT MESSAGE (OUTPATIENT)
Dept: ADMINISTRATIVE | Facility: OTHER | Age: 55
End: 2025-04-28
Payer: COMMERCIAL

## 2025-04-28 RX ORDER — OMEPRAZOLE 40 MG/1
40 CAPSULE, DELAYED RELEASE ORAL
Qty: 180 CAPSULE | Refills: 3 | Status: SHIPPED | OUTPATIENT
Start: 2025-04-28

## 2025-04-28 NOTE — TELEPHONE ENCOUNTER
Refill Routing Note   Medication(s) are not appropriate for processing by [facility] for the following reason(s):        Outside of protocol    [facility] action(s):  Route      Medication Therapy Plan: FLOS; TOTAL DAILY DOSE EXCEEDS MAINTENANCE DOSING FOR PPI      Appointments  past 12m or future 3m with PCP    Date Provider   Last Visit   2/19/2025 Kylah Mosqueda MD   Next Visit   8/19/2025 Kylah Mosqueda MD   ED visits in past 90 days: 0        Note composed:12:33 PM 04/28/2025

## 2025-06-27 DIAGNOSIS — I10 ESSENTIAL HYPERTENSION: ICD-10-CM

## 2025-06-27 RX ORDER — LOSARTAN POTASSIUM 50 MG/1
50 TABLET ORAL
Qty: 90 TABLET | Refills: 3 | Status: SHIPPED | OUTPATIENT
Start: 2025-06-27

## 2025-06-27 NOTE — TELEPHONE ENCOUNTER
Refill Routing Note   Medication(s) are not appropriate for processing by [facility] for the following reason(s):        Required labs outdated    [facility] action(s):  Defer      Medication Therapy Plan: FLOS      Appointments  past 12m or future 3m with PCP    Date Provider   Last Visit   2/19/2025 Kylah Mosqueda MD   Next Visit   8/19/2025 Kylah Mosqueda MD   ED visits in past 90 days: 0        Note composed:6:39 AM 06/27/2025

## 2025-06-27 NOTE — TELEPHONE ENCOUNTER
Care Due:                  Date            Visit Type   Department     Provider  --------------------------------------------------------------------------------                                EP -                              PRIMARY      [facility] FAMILY  Last Visit: 02-      CARE ([facility])   MEDICINE       Kylah Mosqueda                              EP -                              PRIMARY      [facility] FAMILY  Next Visit: 08-      CARE ([facility])   MEDICINE       Kylah Mosqueda                                                            Last  Test          Frequency    Reason                     Performed    Due Date  --------------------------------------------------------------------------------    CMP.........  12 months..  losartan.................  05- 05-    [facility] Embedded Care Due Messages. Reference number: [id number].   6/27/2025 6:14:09 AM CDT

## 2025-07-25 DIAGNOSIS — F17.210 NICOTINE DEPENDENCE, CIGARETTES, UNCOMPLICATED: ICD-10-CM

## 2025-07-25 NOTE — TELEPHONE ENCOUNTER
No care due was identified.  [facility] Embedded Care Due Messages. Reference number: [id number].   7/25/2025 6:13:47 AM CDT

## 2025-07-26 RX ORDER — BUPROPION HYDROCHLORIDE 150 MG/1
300 TABLET ORAL
Qty: 180 TABLET | Refills: 1 | Status: SHIPPED | OUTPATIENT
Start: 2025-07-26

## 2025-07-26 NOTE — TELEPHONE ENCOUNTER
Refill Authorization Note     Refill Decision Note   Paolo Brower  is requesting a refill authorization.  Brief Assessment and Rationale for Refill:  Approve     Medication Therapy Plan:       Medication Reconciliation Completed: No   Comments:     No Care Gaps recommended.     Note composed:12:02 PM 07/26/2025

## 2025-08-06 ENCOUNTER — PATIENT MESSAGE (OUTPATIENT)
Dept: FAMILY MEDICINE | Facility: CLINIC | Age: 55
End: 2025-08-06
Payer: COMMERCIAL

## 2025-08-12 ENCOUNTER — LAB VISIT (OUTPATIENT)
Dept: LAB | Facility: HOSPITAL | Age: 55
End: 2025-08-12
Attending: STUDENT IN AN ORGANIZED HEALTH CARE EDUCATION/TRAINING PROGRAM
Payer: COMMERCIAL

## 2025-08-12 DIAGNOSIS — Z87.898 HISTORY OF PREDIABETES: ICD-10-CM

## 2025-08-12 DIAGNOSIS — E78.5 DYSLIPIDEMIA: ICD-10-CM

## 2025-08-12 DIAGNOSIS — I10 ESSENTIAL HYPERTENSION: ICD-10-CM

## 2025-08-12 DIAGNOSIS — Z12.5 SCREENING FOR PROSTATE CANCER: ICD-10-CM

## 2025-08-12 LAB
ALBUMIN SERPL BCP-MCNC: 4.3 G/DL (ref 3.5–5.2)
ALP SERPL-CCNC: 114 UNIT/L (ref 40–150)
ALT SERPL W/O P-5'-P-CCNC: 34 UNIT/L (ref 10–44)
ANION GAP (OHS): 6 MMOL/L (ref 8–16)
AST SERPL-CCNC: 50 UNIT/L (ref 11–45)
BILIRUB SERPL-MCNC: 0.5 MG/DL (ref 0.1–1)
BUN SERPL-MCNC: 22 MG/DL (ref 6–20)
CALCIUM SERPL-MCNC: 9.1 MG/DL (ref 8.7–10.5)
CHLORIDE SERPL-SCNC: 107 MMOL/L (ref 95–110)
CHOLEST SERPL-MCNC: 138 MG/DL (ref 120–199)
CHOLEST/HDLC SERPL: 4.1 {RATIO} (ref 2–5)
CO2 SERPL-SCNC: 26 MMOL/L (ref 23–29)
CREAT SERPL-MCNC: 1.3 MG/DL (ref 0.5–1.4)
EAG (OHS): 114 MG/DL (ref 68–131)
ERYTHROCYTE [DISTWIDTH] IN BLOOD BY AUTOMATED COUNT: 13.2 % (ref 11.5–14.5)
GFR SERPLBLD CREATININE-BSD FMLA CKD-EPI: >60 ML/MIN/1.73/M2
GLUCOSE SERPL-MCNC: 89 MG/DL (ref 70–110)
HBA1C MFR BLD: 5.6 % (ref 4–5.6)
HCT VFR BLD AUTO: 41.5 % (ref 40–54)
HDLC SERPL-MCNC: 34 MG/DL (ref 40–75)
HDLC SERPL: 24.6 % (ref 20–50)
HGB BLD-MCNC: 14.3 GM/DL (ref 14–18)
LDLC SERPL CALC-MCNC: 69.4 MG/DL (ref 63–159)
MCH RBC QN AUTO: 27.8 PG (ref 27–31)
MCHC RBC AUTO-ENTMCNC: 34.5 G/DL (ref 32–36)
MCV RBC AUTO: 81 FL (ref 82–98)
NONHDLC SERPL-MCNC: 104 MG/DL
PLATELET # BLD AUTO: 317 K/UL (ref 150–450)
PMV BLD AUTO: 9.2 FL (ref 9.2–12.9)
POTASSIUM SERPL-SCNC: 4.3 MMOL/L (ref 3.5–5.1)
PROT SERPL-MCNC: 6.9 GM/DL (ref 6–8.4)
PSA SERPL-MCNC: 1.88 NG/ML
RBC # BLD AUTO: 5.14 M/UL (ref 4.6–6.2)
SODIUM SERPL-SCNC: 139 MMOL/L (ref 136–145)
TRIGL SERPL-MCNC: 173 MG/DL (ref 30–150)
TSH SERPL-ACNC: 2.72 UIU/ML (ref 0.4–4)
WBC # BLD AUTO: 8.54 K/UL (ref 3.9–12.7)

## 2025-08-12 PROCEDURE — 82043 UR ALBUMIN QUANTITATIVE: CPT

## 2025-08-12 PROCEDURE — 83036 HEMOGLOBIN GLYCOSYLATED A1C: CPT

## 2025-08-12 PROCEDURE — 85027 COMPLETE CBC AUTOMATED: CPT

## 2025-08-12 PROCEDURE — 84153 ASSAY OF PSA TOTAL: CPT

## 2025-08-12 PROCEDURE — 82465 ASSAY BLD/SERUM CHOLESTEROL: CPT

## 2025-08-12 PROCEDURE — 36415 COLL VENOUS BLD VENIPUNCTURE: CPT

## 2025-08-12 PROCEDURE — 84443 ASSAY THYROID STIM HORMONE: CPT

## 2025-08-12 PROCEDURE — 82040 ASSAY OF SERUM ALBUMIN: CPT

## 2025-08-13 LAB
ALBUMIN/CREAT UR: 10.2 UG/MG
CREAT UR-MCNC: 284 MG/DL (ref 23–375)
MICROALBUMIN UR-MCNC: 29 UG/ML (ref ?–5000)

## 2025-08-19 ENCOUNTER — OFFICE VISIT (OUTPATIENT)
Dept: FAMILY MEDICINE | Facility: CLINIC | Age: 55
End: 2025-08-19
Payer: COMMERCIAL

## 2025-08-19 VITALS
BODY MASS INDEX: 34.78 KG/M2 | HEIGHT: 74 IN | RESPIRATION RATE: 14 BRPM | SYSTOLIC BLOOD PRESSURE: 128 MMHG | DIASTOLIC BLOOD PRESSURE: 82 MMHG | HEART RATE: 87 BPM | WEIGHT: 271 LBS | OXYGEN SATURATION: 98 %

## 2025-08-19 DIAGNOSIS — E66.9 OBESITY (BMI 30-39.9): ICD-10-CM

## 2025-08-19 DIAGNOSIS — E78.5 DYSLIPIDEMIA: ICD-10-CM

## 2025-08-19 DIAGNOSIS — Z00.00 ENCOUNTER FOR WELLNESS EXAMINATION IN ADULT: Primary | ICD-10-CM

## 2025-08-19 DIAGNOSIS — K21.00 GASTROESOPHAGEAL REFLUX DISEASE WITH ESOPHAGITIS WITHOUT HEMORRHAGE: ICD-10-CM

## 2025-08-19 DIAGNOSIS — I10 ESSENTIAL HYPERTENSION: ICD-10-CM

## 2025-08-19 PROCEDURE — 3074F SYST BP LT 130 MM HG: CPT | Mod: CPTII,S$GLB,, | Performed by: NURSE PRACTITIONER

## 2025-08-19 PROCEDURE — 1159F MED LIST DOCD IN RCRD: CPT | Mod: CPTII,S$GLB,, | Performed by: NURSE PRACTITIONER

## 2025-08-19 PROCEDURE — 99999 PR PBB SHADOW E&M-EST. PATIENT-LVL III: CPT | Mod: PBBFAC,,, | Performed by: NURSE PRACTITIONER

## 2025-08-19 PROCEDURE — 99396 PREV VISIT EST AGE 40-64: CPT | Mod: S$GLB,,, | Performed by: NURSE PRACTITIONER

## 2025-08-19 PROCEDURE — 3008F BODY MASS INDEX DOCD: CPT | Mod: CPTII,S$GLB,, | Performed by: NURSE PRACTITIONER

## 2025-08-19 PROCEDURE — 3079F DIAST BP 80-89 MM HG: CPT | Mod: CPTII,S$GLB,, | Performed by: NURSE PRACTITIONER

## 2025-08-19 PROCEDURE — 3061F NEG MICROALBUMINURIA REV: CPT | Mod: CPTII,S$GLB,, | Performed by: NURSE PRACTITIONER

## 2025-08-19 PROCEDURE — 4010F ACE/ARB THERAPY RXD/TAKEN: CPT | Mod: CPTII,S$GLB,, | Performed by: NURSE PRACTITIONER

## 2025-08-19 PROCEDURE — 1160F RVW MEDS BY RX/DR IN RCRD: CPT | Mod: CPTII,S$GLB,, | Performed by: NURSE PRACTITIONER

## 2025-08-19 PROCEDURE — 3066F NEPHROPATHY DOC TX: CPT | Mod: CPTII,S$GLB,, | Performed by: NURSE PRACTITIONER

## 2025-08-19 PROCEDURE — 3044F HG A1C LEVEL LT 7.0%: CPT | Mod: CPTII,S$GLB,, | Performed by: NURSE PRACTITIONER

## 2025-08-22 ENCOUNTER — CLINICAL SUPPORT (OUTPATIENT)
Dept: FAMILY MEDICINE | Facility: CLINIC | Age: 55
End: 2025-08-22
Payer: COMMERCIAL

## 2025-08-22 VITALS
DIASTOLIC BLOOD PRESSURE: 88 MMHG | RESPIRATION RATE: 18 BRPM | WEIGHT: 271 LBS | OXYGEN SATURATION: 97 % | SYSTOLIC BLOOD PRESSURE: 124 MMHG | BODY MASS INDEX: 34.78 KG/M2 | HEART RATE: 81 BPM | HEIGHT: 74 IN

## 2025-08-22 DIAGNOSIS — I10 ESSENTIAL HYPERTENSION: Primary | ICD-10-CM

## 2025-08-22 PROCEDURE — 99999 PR PBB SHADOW E&M-EST. PATIENT-LVL III: CPT | Mod: PBBFAC,,,

## (undated) DEVICE — SEALER LIGASURE IMPACT 18CM

## (undated) DEVICE — SUT PDS II 3-0 SH 36IN

## (undated) DEVICE — SUT SA85H SILK 2-0

## (undated) DEVICE — SUT SILK SH 2-0 30IN MP BLK

## (undated) DEVICE — SOL NACL IRR 1000ML BTL

## (undated) DEVICE — SUT SILK 0 SUTUPAK SA86H

## (undated) DEVICE — ADAPTER STOPCOCK FEMALE LL

## (undated) DEVICE — GUIDEWIRE ZIPWIRE .035 D 150CM

## (undated) DEVICE — SET CYSTO IRR DRP CHMBR 84IN

## (undated) DEVICE — SYR 10CC LUER LOCK

## (undated) DEVICE — WARMER DRAPE STERILE LF

## (undated) DEVICE — SUT 1 48IN PDS II VIO MONO

## (undated) DEVICE — CANISTER SUCTION 3000CC

## (undated) DEVICE — ELECTRODE BLD EXT 6.50 ST MDFD

## (undated) DEVICE — INTERPULSE SET

## (undated) DEVICE — RELOAD PROXIMATE CUT BLUE 75MM

## (undated) DEVICE — GLOVE SURG ULTRA TOUCH 7.5

## (undated) DEVICE — SEE ITEM #153244

## (undated) DEVICE — SCRUB HIBICLENS 4% CHG 4OZ

## (undated) DEVICE — SUT CTD VICRYL VIL BR CR/SH

## (undated) DEVICE — SOL NACL IRR 3000ML

## (undated) DEVICE — CATH CONE TIP

## (undated) DEVICE — SEE MEDLINE ITEM 157185

## (undated) DEVICE — GOWN SURGICAL XX LARGE X LONG

## (undated) DEVICE — Device

## (undated) DEVICE — BLADE EZ CLEAN 2 1/2

## (undated) DEVICE — GAUZE SPONGE 4X4 12PLY

## (undated) DEVICE — SEE MEDLINE ITEM 152622

## (undated) DEVICE — BAG LINGEMAN DRAIN UROLOGY

## (undated) DEVICE — GOWN B1 X-LG X-LONG

## (undated) DEVICE — CATH POLLACK OPEN-END FLEXI-TI

## (undated) DEVICE — CONTAINER SPECIMEN STRL 4OZ

## (undated) DEVICE — ELECTRODE REM PLYHSV RETURN 9

## (undated) DEVICE — STAPLER SKIN ROTATING HEAD

## (undated) DEVICE — SLEEVE SCD EXPRESS CALF MEDIUM

## (undated) DEVICE — SPONGE NOVAPLUS LAP 18X18IN

## (undated) DEVICE — SOL IRR NACL .9% 3000ML

## (undated) DEVICE — GLOVE SURG ULTRA TOUCH 7

## (undated) DEVICE — SEE MEDLINE ITEM 157117

## (undated) DEVICE — SHEET DRAPE FAN-FOLDED 3/4

## (undated) DEVICE — SEE MEDLINE ITEM 156964

## (undated) DEVICE — CUTTER PROXIMATE BLUE 75MM

## (undated) DEVICE — GLOVE PI ULTRA TOUCH G SURGEON

## (undated) DEVICE — ADHESIVE DERMABOND ADVANCED

## (undated) DEVICE — CLIPPER BLADE MOD 4406 (CAREF)